# Patient Record
Sex: MALE | Race: WHITE | NOT HISPANIC OR LATINO | URBAN - METROPOLITAN AREA
[De-identification: names, ages, dates, MRNs, and addresses within clinical notes are randomized per-mention and may not be internally consistent; named-entity substitution may affect disease eponyms.]

---

## 2019-06-25 ENCOUNTER — EMERGENCY (EMERGENCY)
Facility: HOSPITAL | Age: 79
LOS: 0 days | Discharge: HOME | End: 2019-06-25
Admitting: EMERGENCY MEDICINE
Payer: COMMERCIAL

## 2019-06-25 VITALS
DIASTOLIC BLOOD PRESSURE: 98 MMHG | TEMPERATURE: 98 F | RESPIRATION RATE: 20 BRPM | OXYGEN SATURATION: 99 % | SYSTOLIC BLOOD PRESSURE: 164 MMHG | HEART RATE: 58 BPM
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
## 2019-06-25 DIAGNOSIS — S40.812A ABRASION OF LEFT UPPER ARM, INITIAL ENCOUNTER: ICD-10-CM

## 2019-06-25 DIAGNOSIS — M54.2 CERVICALGIA: ICD-10-CM

## 2019-06-25 PROCEDURE — 99283 EMERGENCY DEPT VISIT LOW MDM: CPT

## 2019-06-25 NOTE — ED PROVIDER NOTE - NS ED ROS FT
Constitutional: (-) fever  Eyes/ENT: (-) blurry vision, (-) epistaxis  Cardiovascular: (-) chest pain, (-) syncope  Respiratory: (-) cough, (-) shortness of breath  Gastrointestinal: (-) vomiting, (-) diarrhea  : (-) dysuria, (-) hematuria  Musculoskeletal: (+) neck pain, (-) back pain, (-) joint pain  Integumentary: (-) rash, (-) edema  Neurological: (-) headache, (-) altered mental status  Allergic/Immunologic: (-) pruritus
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
## 2019-06-25 NOTE — ED PROVIDER NOTE - PROGRESS NOTE DETAILS
Upon initial eval, patient and friend (who is also a pt) standing at doorway, requesting to leave, stating "I feel fine." Encouraged patient to re-enter room for a complete exam. I was directly involved in the management of this patient. Case was discussed with PA Fellow Karen
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
## 2019-06-25 NOTE — ED PROVIDER NOTE - PHYSICAL EXAMINATION
CONST: Well appearing in NAD  EYES: PERRL, EOMI, Sclera and conjunctiva clear.   ENT: No nasal discharge. Oropharynx normal appearing, no erythema or exudates. No abscess or swelling. Uvula midline.   NECK: Non-tender, no meningeal signs. normal ROM. supple   CARD: S1 S2; No jvd  RESP: Equal BS B/L, No wheezes, rhonchi or rales. No distress  GI: Soft, non-tender, non-distended. no cva tenderness. normal BS  MS: Normal ROM in all extremities. pulses 2 +. no calf tenderness or swelling  SKIN: 1cm Abrasion to L lateral arm  NEURO: A&Ox3, No focal deficits. Strength 5/5 with no sensory deficits. Steady gait. Finger to nose intact. Negative pronator drift

## 2021-01-19 ENCOUNTER — TRANSCRIPTION ENCOUNTER (OUTPATIENT)
Age: 81
End: 2021-01-19

## 2021-02-12 ENCOUNTER — TRANSCRIPTION ENCOUNTER (OUTPATIENT)
Age: 81
End: 2021-02-12

## 2022-03-09 ENCOUNTER — TRANSCRIPTION ENCOUNTER (OUTPATIENT)
Age: 82
End: 2022-03-09

## 2022-05-21 ENCOUNTER — NON-APPOINTMENT (OUTPATIENT)
Age: 82
End: 2022-05-21

## 2022-05-22 ENCOUNTER — INPATIENT (INPATIENT)
Facility: HOSPITAL | Age: 82
LOS: 1 days | Discharge: HOME | End: 2022-05-24
Attending: INTERNAL MEDICINE | Admitting: INTERNAL MEDICINE
Payer: MEDICARE

## 2022-05-22 VITALS
TEMPERATURE: 99 F | OXYGEN SATURATION: 93 % | HEIGHT: 69 IN | DIASTOLIC BLOOD PRESSURE: 63 MMHG | RESPIRATION RATE: 20 BRPM | HEART RATE: 70 BPM | WEIGHT: 199.96 LBS | SYSTOLIC BLOOD PRESSURE: 132 MMHG

## 2022-05-22 LAB
ALBUMIN SERPL ELPH-MCNC: 4.3 G/DL — SIGNIFICANT CHANGE UP (ref 3.5–5.2)
ALP SERPL-CCNC: 76 U/L — SIGNIFICANT CHANGE UP (ref 30–115)
ALT FLD-CCNC: 21 U/L — SIGNIFICANT CHANGE UP (ref 0–41)
ANION GAP SERPL CALC-SCNC: 13 MMOL/L — SIGNIFICANT CHANGE UP (ref 7–14)
AST SERPL-CCNC: 23 U/L — SIGNIFICANT CHANGE UP (ref 0–41)
BASOPHILS # BLD AUTO: 0.01 K/UL — SIGNIFICANT CHANGE UP (ref 0–0.2)
BASOPHILS NFR BLD AUTO: 0.2 % — SIGNIFICANT CHANGE UP (ref 0–1)
BILIRUB SERPL-MCNC: 0.4 MG/DL — SIGNIFICANT CHANGE UP (ref 0.2–1.2)
BUN SERPL-MCNC: 25 MG/DL — HIGH (ref 10–20)
CALCIUM SERPL-MCNC: 9.3 MG/DL — SIGNIFICANT CHANGE UP (ref 8.5–10.1)
CHLORIDE SERPL-SCNC: 101 MMOL/L — SIGNIFICANT CHANGE UP (ref 98–110)
CO2 SERPL-SCNC: 23 MMOL/L — SIGNIFICANT CHANGE UP (ref 17–32)
CREAT SERPL-MCNC: 1.3 MG/DL — SIGNIFICANT CHANGE UP (ref 0.7–1.5)
EGFR: 56 ML/MIN/1.73M2 — LOW
EOSINOPHIL # BLD AUTO: 0.02 K/UL — SIGNIFICANT CHANGE UP (ref 0–0.7)
EOSINOPHIL NFR BLD AUTO: 0.4 % — SIGNIFICANT CHANGE UP (ref 0–8)
GLUCOSE SERPL-MCNC: 144 MG/DL — HIGH (ref 70–99)
HCT VFR BLD CALC: 36.4 % — LOW (ref 42–52)
HGB BLD-MCNC: 12.2 G/DL — LOW (ref 14–18)
HMPV RNA SPEC QL NAA+PROBE: DETECTED
IMM GRANULOCYTES NFR BLD AUTO: 0.2 % — SIGNIFICANT CHANGE UP (ref 0.1–0.3)
LYMPHOCYTES # BLD AUTO: 0.81 K/UL — LOW (ref 1.2–3.4)
LYMPHOCYTES # BLD AUTO: 16.1 % — LOW (ref 20.5–51.1)
MCHC RBC-ENTMCNC: 31.5 PG — HIGH (ref 27–31)
MCHC RBC-ENTMCNC: 33.5 G/DL — SIGNIFICANT CHANGE UP (ref 32–37)
MCV RBC AUTO: 94.1 FL — HIGH (ref 80–94)
MONOCYTES # BLD AUTO: 0.54 K/UL — SIGNIFICANT CHANGE UP (ref 0.1–0.6)
MONOCYTES NFR BLD AUTO: 10.8 % — HIGH (ref 1.7–9.3)
NEUTROPHILS # BLD AUTO: 3.63 K/UL — SIGNIFICANT CHANGE UP (ref 1.4–6.5)
NEUTROPHILS NFR BLD AUTO: 72.3 % — SIGNIFICANT CHANGE UP (ref 42.2–75.2)
NRBC # BLD: 0 /100 WBCS — SIGNIFICANT CHANGE UP (ref 0–0)
NT-PROBNP SERPL-SCNC: 215 PG/ML — SIGNIFICANT CHANGE UP (ref 0–300)
PLATELET # BLD AUTO: 182 K/UL — SIGNIFICANT CHANGE UP (ref 130–400)
POTASSIUM SERPL-MCNC: 4.7 MMOL/L — SIGNIFICANT CHANGE UP (ref 3.5–5)
POTASSIUM SERPL-SCNC: 4.7 MMOL/L — SIGNIFICANT CHANGE UP (ref 3.5–5)
PROT SERPL-MCNC: 6.7 G/DL — SIGNIFICANT CHANGE UP (ref 6–8)
RAPID RVP RESULT: DETECTED
RBC # BLD: 3.87 M/UL — LOW (ref 4.7–6.1)
RBC # FLD: 12.2 % — SIGNIFICANT CHANGE UP (ref 11.5–14.5)
SARS-COV-2 RNA SPEC QL NAA+PROBE: SIGNIFICANT CHANGE UP
SODIUM SERPL-SCNC: 137 MMOL/L — SIGNIFICANT CHANGE UP (ref 135–146)
TROPONIN T SERPL-MCNC: 0.01 NG/ML — SIGNIFICANT CHANGE UP
WBC # BLD: 5.02 K/UL — SIGNIFICANT CHANGE UP (ref 4.8–10.8)
WBC # FLD AUTO: 5.02 K/UL — SIGNIFICANT CHANGE UP (ref 4.8–10.8)

## 2022-05-22 PROCEDURE — 71045 X-RAY EXAM CHEST 1 VIEW: CPT | Mod: 26

## 2022-05-22 PROCEDURE — 71275 CT ANGIOGRAPHY CHEST: CPT | Mod: 26,MA

## 2022-05-22 PROCEDURE — 76604 US EXAM CHEST: CPT | Mod: 26

## 2022-05-22 PROCEDURE — 99222 1ST HOSP IP/OBS MODERATE 55: CPT

## 2022-05-22 PROCEDURE — 93010 ELECTROCARDIOGRAM REPORT: CPT

## 2022-05-22 PROCEDURE — 99285 EMERGENCY DEPT VISIT HI MDM: CPT | Mod: FS,25

## 2022-05-22 RX ORDER — DEXTROSE 50 % IN WATER 50 %
25 SYRINGE (ML) INTRAVENOUS ONCE
Refills: 0 | Status: DISCONTINUED | OUTPATIENT
Start: 2022-05-22 | End: 2022-05-24

## 2022-05-22 RX ORDER — GUAIFENESIN/DEXTROMETHORPHAN 600MG-30MG
10 TABLET, EXTENDED RELEASE 12 HR ORAL EVERY 6 HOURS
Refills: 0 | Status: DISCONTINUED | OUTPATIENT
Start: 2022-05-22 | End: 2022-05-24

## 2022-05-22 RX ORDER — IPRATROPIUM/ALBUTEROL SULFATE 18-103MCG
3 AEROSOL WITH ADAPTER (GRAM) INHALATION EVERY 6 HOURS
Refills: 0 | Status: DISCONTINUED | OUTPATIENT
Start: 2022-05-22 | End: 2022-05-24

## 2022-05-22 RX ORDER — HEPARIN SODIUM 5000 [USP'U]/ML
5000 INJECTION INTRAVENOUS; SUBCUTANEOUS EVERY 8 HOURS
Refills: 0 | Status: DISCONTINUED | OUTPATIENT
Start: 2022-05-22 | End: 2022-05-24

## 2022-05-22 RX ORDER — IPRATROPIUM/ALBUTEROL SULFATE 18-103MCG
3 AEROSOL WITH ADAPTER (GRAM) INHALATION
Refills: 0 | Status: COMPLETED | OUTPATIENT
Start: 2022-05-22 | End: 2022-05-22

## 2022-05-22 RX ORDER — SODIUM CHLORIDE 9 MG/ML
1000 INJECTION, SOLUTION INTRAVENOUS
Refills: 0 | Status: DISCONTINUED | OUTPATIENT
Start: 2022-05-22 | End: 2022-05-24

## 2022-05-22 RX ORDER — ATORVASTATIN CALCIUM 80 MG/1
40 TABLET, FILM COATED ORAL AT BEDTIME
Refills: 0 | Status: DISCONTINUED | OUTPATIENT
Start: 2022-05-22 | End: 2022-05-24

## 2022-05-22 RX ORDER — ALLOPURINOL 300 MG
100 TABLET ORAL DAILY
Refills: 0 | Status: DISCONTINUED | OUTPATIENT
Start: 2022-05-22 | End: 2022-05-24

## 2022-05-22 RX ORDER — GLUCAGON INJECTION, SOLUTION 0.5 MG/.1ML
1 INJECTION, SOLUTION SUBCUTANEOUS ONCE
Refills: 0 | Status: DISCONTINUED | OUTPATIENT
Start: 2022-05-22 | End: 2022-05-24

## 2022-05-22 RX ORDER — CEFTRIAXONE 500 MG/1
1000 INJECTION, POWDER, FOR SOLUTION INTRAMUSCULAR; INTRAVENOUS ONCE
Refills: 0 | Status: COMPLETED | OUTPATIENT
Start: 2022-05-22 | End: 2022-05-22

## 2022-05-22 RX ORDER — DEXTROSE 50 % IN WATER 50 %
15 SYRINGE (ML) INTRAVENOUS ONCE
Refills: 0 | Status: DISCONTINUED | OUTPATIENT
Start: 2022-05-22 | End: 2022-05-24

## 2022-05-22 RX ORDER — ALLOPURINOL 300 MG
0 TABLET ORAL
Qty: 0 | Refills: 0 | DISCHARGE

## 2022-05-22 RX ORDER — AZITHROMYCIN 500 MG/1
500 TABLET, FILM COATED ORAL ONCE
Refills: 0 | Status: COMPLETED | OUTPATIENT
Start: 2022-05-22 | End: 2022-05-22

## 2022-05-22 RX ORDER — ATORVASTATIN CALCIUM 80 MG/1
0 TABLET, FILM COATED ORAL
Qty: 0 | Refills: 0 | DISCHARGE

## 2022-05-22 RX ORDER — DEXTROSE 50 % IN WATER 50 %
12.5 SYRINGE (ML) INTRAVENOUS ONCE
Refills: 0 | Status: DISCONTINUED | OUTPATIENT
Start: 2022-05-22 | End: 2022-05-24

## 2022-05-22 RX ORDER — LISINOPRIL 2.5 MG/1
40 TABLET ORAL DAILY
Refills: 0 | Status: DISCONTINUED | OUTPATIENT
Start: 2022-05-22 | End: 2022-05-22

## 2022-05-22 RX ORDER — INSULIN LISPRO 100/ML
VIAL (ML) SUBCUTANEOUS
Refills: 0 | Status: DISCONTINUED | OUTPATIENT
Start: 2022-05-22 | End: 2022-05-24

## 2022-05-22 RX ADMIN — Medication 3 MILLILITER(S): at 11:30

## 2022-05-22 RX ADMIN — CEFTRIAXONE 100 MILLIGRAM(S): 500 INJECTION, POWDER, FOR SOLUTION INTRAMUSCULAR; INTRAVENOUS at 20:16

## 2022-05-22 RX ADMIN — Medication 3 MILLILITER(S): at 12:30

## 2022-05-22 RX ADMIN — AZITHROMYCIN 255 MILLIGRAM(S): 500 TABLET, FILM COATED ORAL at 20:16

## 2022-05-22 RX ADMIN — Medication 125 MILLIGRAM(S): at 13:05

## 2022-05-22 RX ADMIN — Medication 3 MILLILITER(S): at 13:06

## 2022-05-22 NOTE — ED PROVIDER NOTE - CARE PLAN
1 Principal Discharge DX:	Hypoxia  Secondary Diagnosis:	Elevated troponin  Secondary Diagnosis:	Pneumonia due to human metapneumovirus

## 2022-05-22 NOTE — H&P ADULT - ATTENDING COMMENTS
GENERAL: NAD  HEAD:  Atraumatic, Normocephalic  EYES: EOMI, Sclera White   NECK: Supple, No JVD  CHEST/LUNG: clear b/l breath sounds; No wheezing, rhonchi, or crackles  HEART: Regular rate and rhythm; s1, s2, No murmurs, rubs, or gallops  ABDOMEN: Soft, Nontender, Nondistended; Bowel sounds present, No rebound or guarding noted   EXTREMITIES:  No lower extremity edema or calf tenderness to palpation.  No clubbing or cyanosis  PSYCH: AAOx3, pleasant, cooperative, not anxious  NEUROLOGY: CN intact, 5/5 strength in all extremities, Sensation grossly intact.   SKIN: No rashes or lesions    81 yr old male with hx of DM, HTN, HLD, and Gout admitted for cough and chest congestion for 3-4 days.     # Viral URTI  - RVP (+): human metapneumovirus   - sat 97% on RA at encounter   - pt states his cough getting better  - CTA chest negative for PE. Shows Tree-in-bud nodularity and ground-glass opacities most prominent at right lung base  - hold abx  - symptomatic management    # ARIELLE   - Cr 1.3   - likely prerenal   - give IVF @ 75cc/hr and encourage PO intake   - hold ACE-inhbitor   - monitor bmp    # Anticipated for discharge    **Pt seen on 05/22/22

## 2022-05-22 NOTE — ED PROVIDER NOTE - ATTENDING APP SHARED VISIT CONTRIBUTION OF CARE
81 -year-old male with history of hypertension, diabetes, hyperlipidemia, former smoker presenting with a cough x4 days.  Patient denies any fevers.  Denies any chest pain.  States that he thinks it is related to a pesticide that was [place] on [place] however he has never had an issue like this in the past.  Denies any lesions to the skin.  Denies any lower extremity edema.  Went to urgent care center because of the cough and was told he had abnormal breath sounds were referred to the ED for evaluation.    CONSTITUTIONAL: Well-developed; well-nourished; in no acute distress.   SKIN: warm, dry  HEAD: Normocephalic; atraumatic.  EYES: PERRL, EOMI, no conjunctival erythema  ENT: No nasal discharge; airway clear.  NECK: Supple; non tender.  CARD: S1, S2 normal; no murmurs, gallops, or rubs. Regular rate and rhythm.   RESP: normal work of breathing; crackles bibasilarly- mild exp wheeze bilaterally  ABD: soft ntnd.  EXT: Normal ROM.  No clubbing, cyanosis or edema.   NEURO: Alert, oriented, grossly unremarkable.  PSYCH: Cooperative, appropriate.    copd vs chf vs pneumonia  plan for labs, imaging, will trial duonebs and reassess.

## 2022-05-22 NOTE — H&P ADULT - NSHPLABSRESULTS_GEN_ALL_CORE
12.2   5.02  )-----------( 182      ( 22 May 2022 12:37 )             36.4     05-22    137  |  101  |  25<H>  ----------------------------<  144<H>  4.7   |  23  |  1.3    Ca    9.3      22 May 2022 12:37    TPro  6.7  /  Alb  4.3  /  TBili  0.4  /  DBili  x   /  AST  23  /  ALT  21  /  AlkPhos  76  05-22        CARDIAC MARKERS ( 22 May 2022 12:37 )  x     / 0.01 ng/mL / x     / x     / x            LIVER FUNCTIONS - ( 22 May 2022 12:37 )  Alb: 4.3 g/dL / Pro: 6.7 g/dL / ALK PHOS: 76 U/L / ALT: 21 U/L / AST: 23 U/L / GGT: x                     RADIOLOGY & ADDITIONAL STUDIES:      ACC: [id number] [facility]  [facility]                          PROCEDURE DATE:  05/22/2022          INTERPRETATION:  CLINICAL STATEMENT: Cough, clinical concern for   pulmonary embolus.    TECHNIQUE: Multislice helical sections were obtained from the thoracic   inlet to the lung bases during rapid administration of 75 cc of Omnipaque   350 intravenous contrast using a CTA protocol. Thin sections were   reconstructed through the pulmonary vasculature. Sagittal and coronal   reformatted images were acquired, as well as MIP reconstructed images.    COMPARISON CT: No direct comparison is available. Correlation is made   with CT abdomen and pelvis dated 1/31/2011.    FINDINGS:    PULMONARY EMBOLUS: No pulmonary embolus.    LUNGS: Central airways are patent. Peribronchial wall thickening.   Tree-in-bud nodularity and groundglass opacities most prominent at the   right lung base. Underlying emphysematous changes.    HEART/GREAT VESSELS: The heart is not visually enlarged. Trace   pericardial effusion. The great vessels are of normal caliber.    MEDIASTINUM/THORACIC NODES: No lymphadenopathy. Visualized portions of   the thyroid gland are symmetric.    VISUALIZED UPPER ABDOMEN: Status post cholecystectomy.    BONES/SOFT TISSUES: No acute osseous abnormality or destructive soft   tissue process.    IMPRESSION:    No pulmonary embolus.    Tree-in-bud nodularity and groundglass opacities most prominent at the   right lung base. Differential includes infectious, inflammatory, and   neoplastic etiologies. Consider short-term 3 month follow-up CT chest for   further evaluation.
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
## 2022-05-22 NOTE — H&P ADULT - NSHPPHYSICALEXAM_GEN_ALL_CORE
LOS:     VITALS:   T(C): 36.8 (05-22-22 @ 19:38), Max: 37.1 (05-22-22 @ 10:06)  HR: 58 (05-22-22 @ 19:38) (58 - 77)  BP: 153/74 (05-22-22 @ 19:38) (132/63 - 158/81)  RR: 18 (05-22-22 @ 19:38) (18 - 20)  SpO2: 96% (05-22-22 @ 19:38) (93% - 96%)    GENERAL: NAD, lying in bed comfortably  HEAD:  Atraumatic, Normocephalic  EYES: EOMI, PERRLA, conjunctiva and sclera clear  ENT: Moist mucous membranes  NECK: Supple, No JVD  CHEST/LUNG: Clear to auscultation bilaterally; No rales, rhonchi, wheezing, or rubs. Unlabored respirations  HEART: Regular rate and rhythm; No murmurs, rubs, or gallops  ABDOMEN: BSx4; Soft, nontender, nondistended  EXTREMITIES:  2+ Peripheral Pulses, brisk capillary refill. No clubbing, cyanosis, or edema  NERVOUS SYSTEM:  A&Ox3, no focal deficits   SKIN: No rashes or lesions VITALS:   T(C): 36.8 (05-22-22 @ 19:38), Max: 37.1 (05-22-22 @ 10:06)  HR: 58 (05-22-22 @ 19:38) (58 - 77)  BP: 153/74 (05-22-22 @ 19:38) (132/63 - 158/81)  RR: 18 (05-22-22 @ 19:38) (18 - 20)  SpO2: 96% (05-22-22 @ 19:38) (93% - 96%)    GENERAL: NAD, lying in bed comfortably  HEAD:  Atraumatic, Normocephalic  EYES: EOMI, PERRLA, conjunctiva and sclera clear  CHEST/LUNG: Clear to auscultation bilaterally;   HEART: Regular rate and rhythm; No murmurs, rubs, or gallops  ABDOMEN: BSx4; Soft, nontender, nondistended  EXTREMITIES:  2+ Peripheral Pulses, brisk capillary refill. No clubbing, cyanosis, or edema  NERVOUS SYSTEM:  A&Ox3, no focal deficits

## 2022-05-22 NOTE — ED PROVIDER NOTE - PHYSICAL EXAMINATION
GENERAL: Well-nourished, Well-developed. NAD.  HEAD: No visible or palpable bumps or hematomas. No ecchymosis behind ears B/L.  Eyes: PERRLA, EOMI. No asymmetry. No nystagmus. No conjunctival injection. Non-icteric sclera.  ENMT: MMM.   Neck: Supple. FROM  CVS: Normal S1,S2. No murmurs appreciated on auscultation   RESP: No use of accessory muscles. Chest rise symmetrical with good expansion. (+)b/l mild basilar crackles/wheezing  GI: Normal auscultation of bowel sounds in all 4 quadrants. Soft, Nontender, Nondistended. No guarding or rebound tenderness. No CVAT B/L.  Skin: Warm, Dry. No rashes or lesions. Good cap refill < 2 sec B/L.  EXT: Radial and pedal pulses present B/L. No calf tenderness or swelling B/L. No palpable cords. No pedal edema B/L.

## 2022-05-22 NOTE — ED PROVIDER NOTE - CLINICAL SUMMARY MEDICAL DECISION MAKING FREE TEXT BOX
82 yo M w/ hx as stated p/w cough.   Patient hypoxic to 88% on RA with ambulation.  Labs and imaging reviewed- tree in bud changes to the RLL.  First dose of abx given in ED.  +hpmv   admitted for further management.

## 2022-05-22 NOTE — H&P ADULT - ASSESSMENT
80 y/o male with PMHx of DM, HTN, HLD, and Gout presented to the ED for cough and chest congestion for 3-4 days.     #Productive Cough secondary to hMPV  - pt reports 3-4 days of productive cough and chest congestion  - denies any fevers, chills, SOB, chest pain  - admitted for pulse ox 93% on RA, currently saturating 95-96% on RA, looks comfortable   - afebrile, no leukocytosis   - CTA chest negative for PE. Shows Tree-in-bud nodularity and groundglass opacities most prominent at right lung base  - s/p Azithromycin and Rocephin in ED, monitor off Abx   - s/p Solumedrol 125mcg in ED  - RVP positive for human metapneumovirus   - supportive care with cough suppressants and mucinex     #ARIELLE   - Cr 1.3 on admission, unknown baseline   - pt denies any kidney disease, was told last week his blood work showed dehydration  - pt reports poor oral intake   - give IVF @ 75cc/hr and encourage PO intake   - hold ACE-inhbitor     #Tree-in-bud nodularity and ground glass opacities  #former smoker, quit >50 years ago   - noted on CT scan, differential infectious vs inflammatory vs neoplastic   - follow up CT in 3 months     #DM   - hold janumet  - monitor FS, start basal/bolus if FS >180  - check A1c     #HTN  - holding Lisinopril (on Rampiril at home) for ARIELLE    #HLD  - continue with atorvastatin    #DVT PPx: Heparin subq  #GI PPx: not indicated  #DASH  82 y/o male with PMHx of DM, HTN, HLD, and Gout presented to the ED for cough and chest congestion for 3-4 days.     #Productive Cough secondary to hMPV  - pt reports 3-4 days of productive cough and chest congestion  - denies any fevers, chills, SOB, chest pain  - admitted for pulse ox 93% on RA, currently saturating 95-96% on RA, looks comfortable   - afebrile, no leukocytosis   - CTA chest negative for PE. Shows Tree-in-bud nodularity and groundglass opacities most prominent at right lung base  - s/p Azithromycin and Rocephin in ED, monitor off Abx   - s/p Solumedrol 125mcg in ED  - RVP positive for human metapneumovirus   - supportive care with cough suppressants and mucinex     #ARIELLE   - Cr 1.3 on admission, unknown baseline   - pt denies any kidney disease, was told last week his blood work showed dehydration  - pt reports poor oral intake   - give IVF @ 75cc/hr and encourage PO intake   - hold ACE-inhbitor     #Tree-in-bud nodularity and ground glass opacities  #former smoker, quit >50 years ago   - noted on CT scan, differential infectious vs inflammatory vs neoplastic   - follow up CT in 3 months     #DM   - hold janumet  - monitor FS, start basal/bolus if FS >180  - check A1c     #HTN  - holding Lisinopril (on Rampiril at home) for ARIELLE    #HLD  - continue with atorvastatin    #DVT PPx: Heparin subq  #GI PPx: not indicated  #DASH   MED REC completed with pt at bedside, confirm with [facility]  80 y/o male with PMHx of DM, HTN, HLD, and Gout presented to the ED for cough and chest congestion for 3-4 days.     #Productive Cough secondary to hMPV  - pt reports 3-4 days of productive cough and chest congestion  - denies any fevers, chills, SOB, chest pain  - admitted for pulse ox 93% on RA, currently saturating 95-96% on RA, looks comfortable   - afebrile, no leukocytosis   - CTA chest negative for PE. Shows Tree-in-bud nodularity and groundglass opacities most prominent at right lung base  - s/p Azithromycin and Rocephin in ED, monitor off Abx   - s/p Solumedrol 125mcg in ED  - RVP positive for human metapneumovirus   - supportive care with cough suppressants and mucinex     #ARIELLE   - Cr 1.3 on admission, unknown baseline   - pt denies any kidney disease, was told last week his blood work showed dehydration  - pt reports poor oral intake   - give IVF @ 75cc/hr and encourage PO intake   - hold ACE-inhbitor     #Tree-in-bud nodularity and ground glass opacities  #former smoker, quit >50 years ago   - noted on CT scan, differential infectious vs inflammatory vs neoplastic   - follow up CT in 3 months     #DM   - hold janumet  - monitor FS, start basal/bolus if FS >180  - check A1c     #HTN  - holding Lisinopril (on Rampiril at home) for ARIELLE    #HLD  - continue with atorvastatin    #Gout  - continue with allopurinol     #DVT PPx: Heparin subq  #GI PPx: not indicated  #DASH   MED REC completed with pt at bedside, confirm with [facility]
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
## 2022-05-22 NOTE — H&P ADULT - NSHPSOCIALHISTORY_GEN_ALL_CORE
Former smoker quit >50years ago   Social drinker Former smoker quit >50years ago   Social drinker  Denies illicit drug use

## 2022-05-22 NOTE — ED PROCEDURE NOTE - NS ED ATTENDING STATEMENT MOD
This was a shared visit with the JOHN. I reviewed and verified the documentation and independently performed the documented:
This was a shared visit with the JOHN. I reviewed and verified the documentation and independently performed the documented:

## 2022-05-22 NOTE — ED PROVIDER NOTE - NS ED ROS FT
Constitutional: (-) fever (-) malaise (-) diaphoresis (-) chills (-) wt. loss (-) bodyaches (-) night sweats  Eyes: (-) visual changes (-) eye pain (-) eye discharge (-) photophobia (-) FB sensation  ENMT: (+) chest congestion (-) runny nose (-) sore throat (-) hoarseness  (-) hearing changes (-) ear pain (-) ear discharge or infections (-) neck pain (-) neck stiffness  Cardiac: (-) chest pain  (-) palpitations (-) syncope (-) edema  Respiratory: (+) cough (-) SOB (-) PINEDA  GI: (-) nausea (-) vomiting (-) diarrhea (-) abdominal pain  : (-) dysuria (-) increased frequency  (-) hematuria (-) incontinence  MS: (-) back pain (-) myalgia (-) muscle weakness (-)  joint pain  Neuro: (-) headache (-) dizziness (-) numbness/tingling to extremities B/L (-) weakness  Skin: (-) rash (-) laceration    Except as documented in the HPI, all other systems are negative.

## 2022-05-22 NOTE — ED PROCEDURE NOTE - NS ED PROCEDURE ASSISTED BY
ATTENDING PROVIDER ATTESTATION:     Milad Martin presented to the emergency department for evaluation of Psychiatric Evaluation (increased depression \"I'm having mixed emotions\" reports hearing and seeing things/voices, last used cocaine earlier today, denies si/hi )   and was initially evaluated by the Medical Resident. See Original ED Note for H&P and ED course above. I have reviewed and discussed the case, including pertinent history (medical, surgical, family and social) and exam findings with the Medical Resident assigned to Milad Martin .  I have personally performed and/or participated in the history, exam, medical decision making, and procedures and agree with all pertinent clinical information and any additional changes or corrections are noted below in my assessment and plan. I have discussed this patient in detail with the resident, and provided the instruction and education,       I have reviewed my findings and recommendations with the assigned Medical Resident, Milad Martin and members of family present at the time of disposition. I have performed a history and physical examination of this patient and directly supervised the resident caring for this patient      History of Present Illness:    Presents to the ED for depression, beginning quite some time ago. The complaint has been persistent, moderate in severity, and worsened by nothing. Increased depression today. Says he is having mixed emotions. Having hallucinations/hearing voices after using cocaine. Denies SI or HI. Denies other complaints.  No chest pain or shortness of breath        Review of Systems:   A complete review of systems was performed and pertinent positives and negatives are stated within HPI, all other systems reviewed and are negative.    --------------------------------------------- PAST HISTORY ---------------------------------------------  Past Medical History:  has a past medical history of
Depression and Schizoaffective disorder ([facility] [address] Past Surgical History:  has a past surgical history that includes eye surgery (19 years ago). Social History:  reports that he has been smoking cigars and cigarettes. He has a 6.00 pack-year smoking history. He has never used smokeless tobacco. He reports current drug use. Frequency: 7.00 times per week. Drugs: Cocaine and Marijuana. He reports that he does not drink alcohol. Family History: family history includes Mental Illness in his mother; No Known Problems in his father; Substance Abuse in his mother. Unless otherwise noted, family history is non contributory    The patients home medications have been reviewed. Allergies: Chlorpheniramine-phenylephrine, Penicillins, and Rondec-d [chlophedianol-pseudoephedrine]      Physical Exam:  Constitutional/General: Alert and oriented x3  Head: Normocephalic and atraumatic  Eyes: PERRL, EOMI, sclera non icteric  ENT: Oropharynx clear, handling secretions  Neck: Supple, full ROM, no stridor, no meningeal signs  Respiratory: Lungs clear to auscultation bilaterally, no wheezes, rales, or rhonchi. Not in respiratory distress  Cardiovascular:  Regular rate. Regular rhythm. No murmurs, no gallops, no rubs. 2+ distal pulses. Equal extremity pulses. Musculoskeletal: Moves all extremities x 4. Warm and well perfused,  no clubbing, no cyanosis, no edema. Palpable peripheral pulses  Integument: skin warm and dry. No rashes. Neurologic: GCS 15, no focal deficits  Psychiatric: Normal Affect      I directly supervised any procedures performed by the resident and was present for the procedure including all critical portions of the procedure           I, Dr. Marcy Hernandez, am the primary provider of record    My Medical Decision Making:         Drug abuse  Also homeless  Also with mood disorder  No SI or HI  No indication for admission  Evaluated by  as well        1. Cocaine abuse ([facility] [address]    2. Homeless    3.  Mood
disorder [facility])           Biju Martínez MD  06/25/21 [id number]
Supervision was available
Supervision was available

## 2022-05-23 ENCOUNTER — TRANSCRIPTION ENCOUNTER (OUTPATIENT)
Age: 82
End: 2022-05-23

## 2022-05-23 VITALS
SYSTOLIC BLOOD PRESSURE: 136 MMHG | OXYGEN SATURATION: 97 % | DIASTOLIC BLOOD PRESSURE: 83 MMHG | RESPIRATION RATE: 18 BRPM | HEART RATE: 64 BPM

## 2022-05-23 DIAGNOSIS — Z98.890 OTHER SPECIFIED POSTPROCEDURAL STATES: Chronic | ICD-10-CM

## 2022-05-23 LAB
A1C WITH ESTIMATED AVERAGE GLUCOSE RESULT: 7.3 % — HIGH (ref 4–5.6)
ALBUMIN SERPL ELPH-MCNC: 4 G/DL — SIGNIFICANT CHANGE UP (ref 3.5–5.2)
ALP SERPL-CCNC: 72 U/L — SIGNIFICANT CHANGE UP (ref 30–115)
ALT FLD-CCNC: 21 U/L — SIGNIFICANT CHANGE UP (ref 0–41)
ANION GAP SERPL CALC-SCNC: 14 MMOL/L — SIGNIFICANT CHANGE UP (ref 7–14)
AST SERPL-CCNC: 24 U/L — SIGNIFICANT CHANGE UP (ref 0–41)
BASOPHILS # BLD AUTO: 0.01 K/UL — SIGNIFICANT CHANGE UP (ref 0–0.2)
BASOPHILS NFR BLD AUTO: 0.2 % — SIGNIFICANT CHANGE UP (ref 0–1)
BILIRUB SERPL-MCNC: 0.3 MG/DL — SIGNIFICANT CHANGE UP (ref 0.2–1.2)
BUN SERPL-MCNC: 31 MG/DL — HIGH (ref 10–20)
CALCIUM SERPL-MCNC: 8.9 MG/DL — SIGNIFICANT CHANGE UP (ref 8.5–10.1)
CHLORIDE SERPL-SCNC: 100 MMOL/L — SIGNIFICANT CHANGE UP (ref 98–110)
CHOLEST SERPL-MCNC: 137 MG/DL — SIGNIFICANT CHANGE UP
CO2 SERPL-SCNC: 22 MMOL/L — SIGNIFICANT CHANGE UP (ref 17–32)
CREAT SERPL-MCNC: 1.2 MG/DL — SIGNIFICANT CHANGE UP (ref 0.7–1.5)
EGFR: 61 ML/MIN/1.73M2 — SIGNIFICANT CHANGE UP
EOSINOPHIL # BLD AUTO: 0 K/UL — SIGNIFICANT CHANGE UP (ref 0–0.7)
EOSINOPHIL NFR BLD AUTO: 0 % — SIGNIFICANT CHANGE UP (ref 0–8)
ESTIMATED AVERAGE GLUCOSE: 163 MG/DL — HIGH (ref 68–114)
GLUCOSE BLDC GLUCOMTR-MCNC: 236 MG/DL — HIGH (ref 70–99)
GLUCOSE SERPL-MCNC: 239 MG/DL — HIGH (ref 70–99)
HCT VFR BLD CALC: 36.6 % — LOW (ref 42–52)
HDLC SERPL-MCNC: 42 MG/DL — SIGNIFICANT CHANGE UP
HGB BLD-MCNC: 12.1 G/DL — LOW (ref 14–18)
IMM GRANULOCYTES NFR BLD AUTO: 0.2 % — SIGNIFICANT CHANGE UP (ref 0.1–0.3)
LIPID PNL WITH DIRECT LDL SERPL: 76 MG/DL — SIGNIFICANT CHANGE UP
LYMPHOCYTES # BLD AUTO: 0.71 K/UL — LOW (ref 1.2–3.4)
LYMPHOCYTES # BLD AUTO: 16.2 % — LOW (ref 20.5–51.1)
MCHC RBC-ENTMCNC: 31.1 PG — HIGH (ref 27–31)
MCHC RBC-ENTMCNC: 33.1 G/DL — SIGNIFICANT CHANGE UP (ref 32–37)
MCV RBC AUTO: 94.1 FL — HIGH (ref 80–94)
MONOCYTES # BLD AUTO: 0.47 K/UL — SIGNIFICANT CHANGE UP (ref 0.1–0.6)
MONOCYTES NFR BLD AUTO: 10.7 % — HIGH (ref 1.7–9.3)
NEUTROPHILS # BLD AUTO: 3.18 K/UL — SIGNIFICANT CHANGE UP (ref 1.4–6.5)
NEUTROPHILS NFR BLD AUTO: 72.7 % — SIGNIFICANT CHANGE UP (ref 42.2–75.2)
NON HDL CHOLESTEROL: 95 MG/DL — SIGNIFICANT CHANGE UP
NRBC # BLD: 0 /100 WBCS — SIGNIFICANT CHANGE UP (ref 0–0)
PLATELET # BLD AUTO: 185 K/UL — SIGNIFICANT CHANGE UP (ref 130–400)
POTASSIUM SERPL-MCNC: 5 MMOL/L — SIGNIFICANT CHANGE UP (ref 3.5–5)
POTASSIUM SERPL-SCNC: 5 MMOL/L — SIGNIFICANT CHANGE UP (ref 3.5–5)
PROT SERPL-MCNC: 6.3 G/DL — SIGNIFICANT CHANGE UP (ref 6–8)
RBC # BLD: 3.89 M/UL — LOW (ref 4.7–6.1)
RBC # FLD: 11.9 % — SIGNIFICANT CHANGE UP (ref 11.5–14.5)
SODIUM SERPL-SCNC: 136 MMOL/L — SIGNIFICANT CHANGE UP (ref 135–146)
TRIGL SERPL-MCNC: 95 MG/DL — SIGNIFICANT CHANGE UP
WBC # BLD: 4.38 K/UL — LOW (ref 4.8–10.8)
WBC # FLD AUTO: 4.38 K/UL — LOW (ref 4.8–10.8)

## 2022-05-23 PROCEDURE — 99239 HOSP IP/OBS DSCHRG MGMT >30: CPT

## 2022-05-23 RX ORDER — RAMIPRIL 5 MG
0 CAPSULE ORAL
Qty: 0 | Refills: 3 | DISCHARGE

## 2022-05-23 RX ORDER — AZITHROMYCIN 500 MG/1
1 TABLET, FILM COATED ORAL
Qty: 5 | Refills: 0
Start: 2022-05-23 | End: 2022-05-27

## 2022-05-23 RX ORDER — GUAIFENESIN/DEXTROMETHORPHAN 600MG-30MG
10 TABLET, EXTENDED RELEASE 12 HR ORAL
Qty: 200 | Refills: 0
Start: 2022-05-23 | End: 2022-05-27

## 2022-05-23 RX ORDER — SITAGLIPTIN AND METFORMIN HYDROCHLORIDE 500; 50 MG/1; MG/1
0 TABLET, FILM COATED ORAL
Qty: 0 | Refills: 2 | DISCHARGE

## 2022-05-23 RX ORDER — EZETIMIBE 10 MG/1
1 TABLET ORAL
Qty: 0 | Refills: 0 | DISCHARGE

## 2022-05-23 RX ORDER — ATORVASTATIN CALCIUM 80 MG/1
1 TABLET, FILM COATED ORAL
Qty: 0 | Refills: 0 | DISCHARGE

## 2022-05-23 RX ORDER — ALLOPURINOL 300 MG
1 TABLET ORAL
Qty: 0 | Refills: 0 | DISCHARGE

## 2022-05-23 RX ADMIN — ATORVASTATIN CALCIUM 40 MILLIGRAM(S): 80 TABLET, FILM COATED ORAL at 02:41

## 2022-05-23 RX ADMIN — SODIUM CHLORIDE 75 MILLILITER(S): 9 INJECTION, SOLUTION INTRAVENOUS at 02:41

## 2022-05-23 RX ADMIN — HEPARIN SODIUM 5000 UNIT(S): 5000 INJECTION INTRAVENOUS; SUBCUTANEOUS at 02:42

## 2022-05-23 RX ADMIN — Medication 2: at 07:29

## 2022-05-23 NOTE — DISCHARGE NOTE PROVIDER - CARE PROVIDER_API CALL
[place] [address]  Phone: [phone]  Fax: [phone]  Follow Up Time: 2 weeks

## 2022-05-23 NOTE — DISCHARGE NOTE PROVIDER - HOSPITAL COURSE
80 y/o male with PMHx of DM, HTN, HLD, and Gout presented to the ED for cough and chest congestion for 3-4 days. Pt tested positive for metapneumovirus. will d/c on azithromycin  Lung nodularities, needs follow up CT in 3 months

## 2022-05-23 NOTE — DISCHARGE NOTE PROVIDER - NSDCCPCAREPLAN_GEN_ALL_CORE_FT
PRINCIPAL DISCHARGE DIAGNOSIS  Diagnosis: Acute bronchiolitis due to human metapneumovirus  Assessment and Plan of Treatment: You were diagnosed with a viral infection. Please make sure to stay hydrated, take tylenol as needed in case you have any fever.      SECONDARY DISCHARGE DIAGNOSES  Diagnosis: Pulmonary nodules  Assessment and Plan of Treatment: You had some nodularities in the lungs on the CT. You will need a follow up CT in 3 months. Please follow up with your primary care doctor.

## 2022-05-23 NOTE — DISCHARGE NOTE NURSING/CASE MANAGEMENT/SOCIAL WORK - PATIENT PORTAL LINK FT
You can access the FollowMyHealth Patient Portal offered by [facility] by registering at the following website: [email or web address]. By joining [employer]’s FollowMyHealth portal, you will also be able to view your health information using other applications (apps) compatible with our system.

## 2022-05-23 NOTE — DISCHARGE NOTE PROVIDER - NSDCMRMEDTOKEN_GEN_ALL_CORE_FT
ALLOPURINOL 100 MG TABLET: 1 tab(s) orally once a day  ATORVASTATIN 40 MG TABLET: 1 tab(s) orally once a day  Azithromycin 5 Day Dose Pack 250 mg oral tablet: 1 tab(s) orally once a day   guaifenesin-dextromethorphan 100 mg-10 mg/5 mL oral liquid: 10 milliliter(s) orally every 6 hours, As needed, Cough  JANUMET 50/500 MG TABLET MS: TAKE 1 TABLET EVERY DAY  RAMIPRIL 10 MG CAPS: TAKE 1 CAPSULE EVERY DAY   ALLOPURINOL 100 MG TABLET: 1 tab(s) orally once a day  ATORVASTATIN 40 MG TABLET: 1 tab(s) orally once a day  Azithromycin 5 Day Dose Pack 250 mg oral tablet: 1 tab(s) orally once a day   ezetimibe 10 mg oral tablet: 1 tab(s) orally once a day  guaifenesin-dextromethorphan 100 mg-10 mg/5 mL oral liquid: 10 milliliter(s) orally every 6 hours, As needed, Cough  JANUMET 50/500 MG TABLET MS: TAKE 1 TABLET EVERY DAY  RAMIPRIL 10 MG CAPS: TAKE 1 CAPSULE EVERY DAY

## 2022-05-23 NOTE — PROGRESS NOTE ADULT - SUBJECTIVE AND OBJECTIVE BOX
pt seen and examined.   no sob, no cp        ROS: no cp, no sob, no n/v, no fever    PAST MEDICAL & SURGICAL HISTORY:  DM (diabetes mellitus)  HTN (hypertension)  HLD (hyperlipidemia)  H/O knee surgery    Vital Signs Last 24 Hrs  T(C): 36.7 (22 May 2022 23:42), Max: 37 (22 May 2022 15:23)  T(F): 98 (22 May 2022 23:42), Max: 98.6 (22 May 2022 15:23)  HR: 64 (23 May 2022 11:00) (48 - 77)  BP: 136/83 (23 May 2022 11:00) (136/83 - 160/77)  BP(mean): --  RR: 18 (23 May 2022 11:00) (18 - 20)  SpO2: 97% (23 May 2022 11:00) (96% - 97%)    physical exam  constitutional NAD, AAOX3, Respiratory  lungs CTA, CVS heart RRR, GI: abdomen Soft NT, ND, BS+, skin: intact  neuro exam Motor, sensory and CN normal, no deficit     MEDICATIONS  (STANDING):  allopurinol 100 milliGRAM(s) Oral daily  atorvastatin 40 milliGRAM(s) Oral at bedtime  dextrose 5%. 1000 milliLiter(s) (50 mL/Hr) IV Continuous <Continuous>  dextrose 5%. 1000 milliLiter(s) (100 mL/Hr) IV Continuous <Continuous>  dextrose 50% Injectable 25 Gram(s) IV Push once  dextrose 50% Injectable 12.5 Gram(s) IV Push once  dextrose 50% Injectable 25 Gram(s) IV Push once  glucagon  Injectable 1 milliGRAM(s) IntraMuscular once  heparin   Injectable 5000 Unit(s) SubCutaneous every 8 hours  insulin lispro (ADMELOG) corrective regimen sliding scale   SubCutaneous three times a day before meals  lactated ringers. 1000 milliLiter(s) (75 mL/Hr) IV Continuous <Continuous>    MEDICATIONS  (PRN):  albuterol/ipratropium for Nebulization 3 milliLiter(s) Nebulizer every 6 hours PRN Shortness of Breath and/or Wheezing  dextrose Oral Gel 15 Gram(s) Oral once PRN Blood Glucose LESS THAN 70 milliGRAM(s)/deciliter  guaifenesin/dextromethorphan Oral Liquid 10 milliLiter(s) Oral every 6 hours PRN Cough      CAPILLARY BLOOD GLUCOSE    POCT Blood Glucose.: 236 mg/dL (23 May 2022 07:26)                          12.1   4.38  )-----------( 185      ( 23 May 2022 07:12 )             36.6     05-23    136  |  100  |  31<H>  ----------------------------<  239<H>  5.0   |  22  |  1.2    Ca    8.9      23 May 2022 07:12    TPro  6.3  /  Alb  4.0  /  TBili  0.3  /  DBili  x   /  AST  24  /  ALT  21  /  AlkPhos  72  05-23    < from: CT Angio Chest PE Protocol w/ IV Cont (05.22.22 @ 16:22) >  Tree-in-bud nodularity and groundglass opacities most prominent at the   right lung base. Differential includes infectious, inflammatory, and   neoplastic etiologies. Consider short-term 3 month follow-up CT chest for   further evaluation.    < end of copied text >        CARDIAC MARKERS ( 22 May 2022 12:37 )  x     / 0.01 ng/mL / x     / x     / x        a/p  # acute viral bronchitis, supprotive measures,   not hypoxic  zpack for abnl imagaing, poss pna,     # abnl ct chest , repeat ct in 3 months     # abnl cr, probably chronic, no previous labs in the system, outpt fu     # dm cont meds    # mild hyperkalmia, out pt fu     #Progress Note Handoff  Pending (specify): discharge  Family discussion: dw pt and his wife at the bedside. left msg for PMD Dr Mullins.   Disposition: Home

## 2022-05-23 NOTE — ED POST DISCHARGE NOTE - RESULT SUMMARY
discussed with patients son CTA chest results regarding ground glass opacities. recommend rpt CT chest in 3 months.

## 2022-05-23 NOTE — DISCHARGE NOTE NURSING/CASE MANAGEMENT/SOCIAL WORK - NSDCPEFALRISK_GEN_ALL_CORE
For information on Fall & Injury Prevention, visit: [email or web address] OR  [email or web address] OR  https://www.cdc.gov/steadi/patient.html

## 2022-05-27 DIAGNOSIS — E78.5 HYPERLIPIDEMIA, UNSPECIFIED: ICD-10-CM

## 2022-05-27 DIAGNOSIS — M10.9 GOUT, UNSPECIFIED: ICD-10-CM

## 2022-05-27 DIAGNOSIS — N17.9 ACUTE KIDNEY FAILURE, UNSPECIFIED: ICD-10-CM

## 2022-05-27 DIAGNOSIS — R06.02 SHORTNESS OF BREATH: ICD-10-CM

## 2022-05-27 DIAGNOSIS — R91.1 SOLITARY PULMONARY NODULE: ICD-10-CM

## 2022-05-27 DIAGNOSIS — E87.5 HYPERKALEMIA: ICD-10-CM

## 2022-05-27 DIAGNOSIS — I10 ESSENTIAL (PRIMARY) HYPERTENSION: ICD-10-CM

## 2022-05-27 DIAGNOSIS — R91.8 OTHER NONSPECIFIC ABNORMAL FINDING OF LUNG FIELD: ICD-10-CM

## 2022-05-27 DIAGNOSIS — J21.1 ACUTE BRONCHIOLITIS DUE TO HUMAN METAPNEUMOVIRUS: ICD-10-CM

## 2022-05-27 DIAGNOSIS — E11.9 TYPE 2 DIABETES MELLITUS WITHOUT COMPLICATIONS: ICD-10-CM

## 2022-05-27 DIAGNOSIS — J12.3 HUMAN METAPNEUMOVIRUS PNEUMONIA: ICD-10-CM

## 2022-05-27 DIAGNOSIS — Z87.891 PERSONAL HISTORY OF NICOTINE DEPENDENCE: ICD-10-CM

## 2024-01-04 ENCOUNTER — NON-APPOINTMENT (OUTPATIENT)
Age: 84
End: 2024-01-04

## 2024-10-04 ENCOUNTER — INPATIENT (INPATIENT)
Facility: HOSPITAL | Age: 84
LOS: 11 days | Discharge: HOME HEALTH | DRG: 233 | End: 2024-10-16
Attending: THORACIC SURGERY (CARDIOTHORACIC VASCULAR SURGERY) | Admitting: STUDENT IN AN ORGANIZED HEALTH CARE EDUCATION/TRAINING PROGRAM
Payer: MEDICARE

## 2024-10-04 VITALS
DIASTOLIC BLOOD PRESSURE: 67 MMHG | RESPIRATION RATE: 18 BRPM | OXYGEN SATURATION: 96 % | SYSTOLIC BLOOD PRESSURE: 154 MMHG | HEART RATE: 76 BPM | WEIGHT: 205.47 LBS | TEMPERATURE: 98 F

## 2024-10-04 DIAGNOSIS — R79.89 OTHER SPECIFIED ABNORMAL FINDINGS OF BLOOD CHEMISTRY: ICD-10-CM

## 2024-10-04 DIAGNOSIS — Z98.890 OTHER SPECIFIED POSTPROCEDURAL STATES: Chronic | ICD-10-CM

## 2024-10-04 PROBLEM — I10 ESSENTIAL (PRIMARY) HYPERTENSION: Chronic | Status: ACTIVE | Noted: 2022-05-22

## 2024-10-04 PROBLEM — E11.9 TYPE 2 DIABETES MELLITUS WITHOUT COMPLICATIONS: Chronic | Status: ACTIVE | Noted: 2022-05-22

## 2024-10-04 PROBLEM — Z00.00 ENCOUNTER FOR PREVENTIVE HEALTH EXAMINATION: Status: ACTIVE | Noted: 2024-10-04

## 2024-10-04 PROBLEM — E78.5 HYPERLIPIDEMIA, UNSPECIFIED: Chronic | Status: ACTIVE | Noted: 2022-05-22

## 2024-10-04 LAB
ALBUMIN SERPL ELPH-MCNC: 4.2 G/DL — SIGNIFICANT CHANGE UP (ref 3.5–5.2)
ALP SERPL-CCNC: 105 U/L — SIGNIFICANT CHANGE UP (ref 30–115)
ALT FLD-CCNC: 73 U/L — HIGH (ref 0–41)
ANION GAP SERPL CALC-SCNC: 16 MMOL/L — HIGH (ref 7–14)
APPEARANCE UR: ABNORMAL
AST SERPL-CCNC: 264 U/L — HIGH (ref 0–41)
BASOPHILS # BLD AUTO: 0 K/UL — SIGNIFICANT CHANGE UP (ref 0–0.2)
BASOPHILS NFR BLD AUTO: 0 % — SIGNIFICANT CHANGE UP (ref 0–1)
BILIRUB SERPL-MCNC: 0.7 MG/DL — SIGNIFICANT CHANGE UP (ref 0.2–1.2)
BILIRUB UR-MCNC: ABNORMAL
BUN SERPL-MCNC: 45 MG/DL — HIGH (ref 10–20)
CALCIUM SERPL-MCNC: 9 MG/DL — SIGNIFICANT CHANGE UP (ref 8.4–10.4)
CHLORIDE SERPL-SCNC: 97 MMOL/L — LOW (ref 98–110)
CO2 SERPL-SCNC: 22 MMOL/L — SIGNIFICANT CHANGE UP (ref 17–32)
COLOR SPEC: SIGNIFICANT CHANGE UP
CREAT SERPL-MCNC: 2.6 MG/DL — HIGH (ref 0.7–1.5)
DIFF PNL FLD: ABNORMAL
EGFR: 24 ML/MIN/1.73M2 — LOW
EOSINOPHIL # BLD AUTO: 0 K/UL — SIGNIFICANT CHANGE UP (ref 0–0.7)
EOSINOPHIL NFR BLD AUTO: 0 % — SIGNIFICANT CHANGE UP (ref 0–8)
FLUAV AG NPH QL: SIGNIFICANT CHANGE UP
FLUBV AG NPH QL: SIGNIFICANT CHANGE UP
GAS PNL BLDV: SIGNIFICANT CHANGE UP
GLUCOSE SERPL-MCNC: 208 MG/DL — HIGH (ref 70–99)
GLUCOSE UR QL: NEGATIVE MG/DL — SIGNIFICANT CHANGE UP
HCT VFR BLD CALC: 37.8 % — LOW (ref 42–52)
HGB BLD-MCNC: 12.4 G/DL — LOW (ref 14–18)
KETONES UR-MCNC: ABNORMAL MG/DL
LEUKOCYTE ESTERASE UR-ACNC: ABNORMAL
LYMPHOCYTES # BLD AUTO: 0.54 K/UL — LOW (ref 1.2–3.4)
LYMPHOCYTES # BLD AUTO: 2.7 % — LOW (ref 20.5–51.1)
MCHC RBC-ENTMCNC: 31.6 PG — HIGH (ref 27–31)
MCHC RBC-ENTMCNC: 32.8 G/DL — SIGNIFICANT CHANGE UP (ref 32–37)
MCV RBC AUTO: 96.4 FL — HIGH (ref 80–94)
MONOCYTES # BLD AUTO: 1.06 K/UL — HIGH (ref 0.1–0.6)
MONOCYTES NFR BLD AUTO: 5.3 % — SIGNIFICANT CHANGE UP (ref 1.7–9.3)
NEUTROPHILS # BLD AUTO: 18.12 K/UL — HIGH (ref 1.4–6.5)
NEUTROPHILS NFR BLD AUTO: 86.6 % — HIGH (ref 42.2–75.2)
NITRITE UR-MCNC: NEGATIVE — SIGNIFICANT CHANGE UP
NT-PROBNP SERPL-SCNC: 1655 PG/ML — HIGH (ref 0–300)
PH UR: 5.5 — SIGNIFICANT CHANGE UP (ref 5–8)
PLATELET # BLD AUTO: 226 K/UL — SIGNIFICANT CHANGE UP (ref 130–400)
PMV BLD: 11 FL — HIGH (ref 7.4–10.4)
POTASSIUM SERPL-MCNC: 4.3 MMOL/L — SIGNIFICANT CHANGE UP (ref 3.5–5)
POTASSIUM SERPL-SCNC: 4.3 MMOL/L — SIGNIFICANT CHANGE UP (ref 3.5–5)
PROT SERPL-MCNC: 6.8 G/DL — SIGNIFICANT CHANGE UP (ref 6–8)
PROT UR-MCNC: 300 MG/DL
RBC # BLD: 3.92 M/UL — LOW (ref 4.7–6.1)
RBC # FLD: 12.4 % — SIGNIFICANT CHANGE UP (ref 11.5–14.5)
RSV RNA NPH QL NAA+NON-PROBE: SIGNIFICANT CHANGE UP
SARS-COV-2 RNA SPEC QL NAA+PROBE: SIGNIFICANT CHANGE UP
SODIUM SERPL-SCNC: 135 MMOL/L — SIGNIFICANT CHANGE UP (ref 135–146)
SP GR SPEC: 1.02 — SIGNIFICANT CHANGE UP (ref 1–1.03)
TROPONIN T, HIGH SENSITIVITY RESULT: 344 NG/L — CRITICAL HIGH (ref 6–21)
TROPONIN T, HIGH SENSITIVITY RESULT: 370 NG/L — CRITICAL HIGH (ref 6–21)
UROBILINOGEN FLD QL: 1 MG/DL — SIGNIFICANT CHANGE UP (ref 0.2–1)
WBC # BLD: 20.09 K/UL — HIGH (ref 4.8–10.8)
WBC # FLD AUTO: 20.09 K/UL — HIGH (ref 4.8–10.8)

## 2024-10-04 PROCEDURE — 94002 VENT MGMT INPAT INIT DAY: CPT

## 2024-10-04 PROCEDURE — 73030 X-RAY EXAM OF SHOULDER: CPT | Mod: LT

## 2024-10-04 PROCEDURE — 84443 ASSAY THYROID STIM HORMONE: CPT

## 2024-10-04 PROCEDURE — 93880 EXTRACRANIAL BILAT STUDY: CPT

## 2024-10-04 PROCEDURE — 70450 CT HEAD/BRAIN W/O DYE: CPT | Mod: 26,MC

## 2024-10-04 PROCEDURE — 99285 EMERGENCY DEPT VISIT HI MDM: CPT | Mod: FS

## 2024-10-04 PROCEDURE — 71045 X-RAY EXAM CHEST 1 VIEW: CPT | Mod: 26

## 2024-10-04 PROCEDURE — 85730 THROMBOPLASTIN TIME PARTIAL: CPT

## 2024-10-04 PROCEDURE — 97162 PT EVAL MOD COMPLEX 30 MIN: CPT | Mod: GP

## 2024-10-04 PROCEDURE — 80048 BASIC METABOLIC PNL TOTAL CA: CPT

## 2024-10-04 PROCEDURE — 86923 COMPATIBILITY TEST ELECTRIC: CPT

## 2024-10-04 PROCEDURE — 87389 HIV-1 AG W/HIV-1&-2 AB AG IA: CPT

## 2024-10-04 PROCEDURE — C1729: CPT

## 2024-10-04 PROCEDURE — A9500: CPT

## 2024-10-04 PROCEDURE — 84295 ASSAY OF SERUM SODIUM: CPT

## 2024-10-04 PROCEDURE — 82330 ASSAY OF CALCIUM: CPT

## 2024-10-04 PROCEDURE — 81001 URINALYSIS AUTO W/SCOPE: CPT

## 2024-10-04 PROCEDURE — 84484 ASSAY OF TROPONIN QUANT: CPT

## 2024-10-04 PROCEDURE — 83605 ASSAY OF LACTIC ACID: CPT

## 2024-10-04 PROCEDURE — 87086 URINE CULTURE/COLONY COUNT: CPT

## 2024-10-04 PROCEDURE — 76770 US EXAM ABDO BACK WALL COMP: CPT

## 2024-10-04 PROCEDURE — 36415 COLL VENOUS BLD VENIPUNCTURE: CPT

## 2024-10-04 PROCEDURE — 97530 THERAPEUTIC ACTIVITIES: CPT | Mod: GP

## 2024-10-04 PROCEDURE — 94010 BREATHING CAPACITY TEST: CPT

## 2024-10-04 PROCEDURE — 84132 ASSAY OF SERUM POTASSIUM: CPT

## 2024-10-04 PROCEDURE — 36430 TRANSFUSION BLD/BLD COMPNT: CPT

## 2024-10-04 PROCEDURE — 82550 ASSAY OF CK (CPK): CPT

## 2024-10-04 PROCEDURE — 87640 STAPH A DNA AMP PROBE: CPT

## 2024-10-04 PROCEDURE — 86850 RBC ANTIBODY SCREEN: CPT

## 2024-10-04 PROCEDURE — 87186 SC STD MICRODIL/AGAR DIL: CPT

## 2024-10-04 PROCEDURE — 85610 PROTHROMBIN TIME: CPT

## 2024-10-04 PROCEDURE — 71046 X-RAY EXAM CHEST 2 VIEWS: CPT

## 2024-10-04 PROCEDURE — 82962 GLUCOSE BLOOD TEST: CPT

## 2024-10-04 PROCEDURE — 85014 HEMATOCRIT: CPT

## 2024-10-04 PROCEDURE — 85018 HEMOGLOBIN: CPT

## 2024-10-04 PROCEDURE — 93306 TTE W/DOPPLER COMPLETE: CPT

## 2024-10-04 PROCEDURE — 80053 COMPREHEN METABOLIC PANEL: CPT

## 2024-10-04 PROCEDURE — 72125 CT NECK SPINE W/O DYE: CPT | Mod: 26,MC

## 2024-10-04 PROCEDURE — 93005 ELECTROCARDIOGRAM TRACING: CPT

## 2024-10-04 PROCEDURE — 82607 VITAMIN B-12: CPT

## 2024-10-04 PROCEDURE — 82746 ASSAY OF FOLIC ACID SERUM: CPT

## 2024-10-04 PROCEDURE — C1894: CPT

## 2024-10-04 PROCEDURE — 71045 X-RAY EXAM CHEST 1 VIEW: CPT

## 2024-10-04 PROCEDURE — 83010 ASSAY OF HAPTOGLOBIN QUANT: CPT

## 2024-10-04 PROCEDURE — 93458 L HRT ARTERY/VENTRICLE ANGIO: CPT

## 2024-10-04 PROCEDURE — 82728 ASSAY OF FERRITIN: CPT

## 2024-10-04 PROCEDURE — 94640 AIRWAY INHALATION TREATMENT: CPT

## 2024-10-04 PROCEDURE — 83615 LACTATE (LD) (LDH) ENZYME: CPT

## 2024-10-04 PROCEDURE — 82803 BLOOD GASES ANY COMBINATION: CPT

## 2024-10-04 PROCEDURE — 83735 ASSAY OF MAGNESIUM: CPT

## 2024-10-04 PROCEDURE — 86891 AUTOLOGOUS BLOOD OP SALVAGE: CPT

## 2024-10-04 PROCEDURE — C1889: CPT

## 2024-10-04 PROCEDURE — 78452 HT MUSCLE IMAGE SPECT MULT: CPT | Mod: MC

## 2024-10-04 PROCEDURE — 83550 IRON BINDING TEST: CPT

## 2024-10-04 PROCEDURE — 72170 X-RAY EXAM OF PELVIS: CPT | Mod: 26

## 2024-10-04 PROCEDURE — 70551 MRI BRAIN STEM W/O DYE: CPT | Mod: MC

## 2024-10-04 PROCEDURE — P9016: CPT

## 2024-10-04 PROCEDURE — 70486 CT MAXILLOFACIAL W/O DYE: CPT | Mod: 26,MC

## 2024-10-04 PROCEDURE — 83540 ASSAY OF IRON: CPT

## 2024-10-04 PROCEDURE — P9045: CPT

## 2024-10-04 PROCEDURE — 71275 CT ANGIOGRAPHY CHEST: CPT | Mod: 26,MC

## 2024-10-04 PROCEDURE — 82977 ASSAY OF GGT: CPT

## 2024-10-04 PROCEDURE — 76705 ECHO EXAM OF ABDOMEN: CPT

## 2024-10-04 PROCEDURE — C1751: CPT

## 2024-10-04 PROCEDURE — 86901 BLOOD TYPING SEROLOGIC RH(D): CPT

## 2024-10-04 PROCEDURE — 74177 CT ABD & PELVIS W/CONTRAST: CPT | Mod: 26,MC

## 2024-10-04 PROCEDURE — 84100 ASSAY OF PHOSPHORUS: CPT

## 2024-10-04 PROCEDURE — 93017 CV STRESS TEST TRACING ONLY: CPT

## 2024-10-04 PROCEDURE — 85027 COMPLETE CBC AUTOMATED: CPT

## 2024-10-04 PROCEDURE — 80061 LIPID PANEL: CPT

## 2024-10-04 PROCEDURE — 95819 EEG AWAKE AND ASLEEP: CPT

## 2024-10-04 PROCEDURE — 87641 MR-STAPH DNA AMP PROBE: CPT

## 2024-10-04 PROCEDURE — 86900 BLOOD TYPING SEROLOGIC ABO: CPT

## 2024-10-04 PROCEDURE — 85025 COMPLETE CBC W/AUTO DIFF WBC: CPT

## 2024-10-04 PROCEDURE — 82553 CREATINE MB FRACTION: CPT

## 2024-10-04 PROCEDURE — C1887: CPT

## 2024-10-04 PROCEDURE — 93010 ELECTROCARDIOGRAM REPORT: CPT | Mod: 76

## 2024-10-04 PROCEDURE — 97116 GAIT TRAINING THERAPY: CPT | Mod: GP

## 2024-10-04 PROCEDURE — 80074 ACUTE HEPATITIS PANEL: CPT

## 2024-10-04 PROCEDURE — 82010 KETONE BODYS QUAN: CPT

## 2024-10-04 PROCEDURE — 83036 HEMOGLOBIN GLYCOSYLATED A1C: CPT

## 2024-10-04 PROCEDURE — C1769: CPT

## 2024-10-04 RX ORDER — SODIUM CHLORIDE IRRIG SOLUTION 0.9 %
1000 SOLUTION, IRRIGATION IRRIGATION ONCE
Refills: 0 | Status: COMPLETED | OUTPATIENT
Start: 2024-10-04 | End: 2024-10-04

## 2024-10-04 RX ORDER — SODIUM CHLORIDE IRRIG SOLUTION 0.9 %
1700 SOLUTION, IRRIGATION IRRIGATION ONCE
Refills: 0 | Status: COMPLETED | OUTPATIENT
Start: 2024-10-04 | End: 2024-10-04

## 2024-10-04 RX ORDER — CEFTRIAXONE SODIUM 1 G
1000 VIAL (EA) INJECTION ONCE
Refills: 0 | Status: COMPLETED | OUTPATIENT
Start: 2024-10-04 | End: 2024-10-04

## 2024-10-04 RX ADMIN — Medication 100 MILLIGRAM(S): at 21:53

## 2024-10-04 RX ADMIN — Medication 1700 MILLILITER(S): at 22:36

## 2024-10-04 RX ADMIN — Medication 1000 MILLILITER(S): at 23:00

## 2024-10-04 RX ADMIN — Medication 1000 MILLILITER(S): at 21:54

## 2024-10-04 NOTE — ED PROVIDER NOTE - PROGRESS NOTE DETAILS
Please check with the patient KA - CT resulted no PE. Cystitis on CT. Pending UA. Trop 370 with EKG changes. Pending repeat Trop. KA - Cardio telemetry evaluated patient. Recommended admission to medicine telemetry due to other comorbidities. Cardio fellow aware and will see patient. Sepsis 2150.

## 2024-10-04 NOTE — ED PROVIDER NOTE - OBJECTIVE STATEMENT
Patient is an 84 year old male with pmhx of htn, hld, dm, asthma, gerd presents after syncope with fall yesterday. He had unknown head injury but admits to hitting his frontal head when trying to stand up after syncope. He also admits to left sided rib "soreness". He denies any fever, cough, sore throat, N/V, endorsed chest pain, shortness of breath, abdominal pain, or urinary complaints.

## 2024-10-04 NOTE — ED PROVIDER NOTE - PHYSICAL EXAMINATION
As Follows:  CONST: Well appearing in NAD  EYES: PERRL, EOMI, Sclera and conjunctiva clear.   ENT: No nasal discharge. Oropharynx normal appearing, no erythema or exudates. Uvula midline  CARD: No murmurs, rubs, or gallops; Normal rate and rhythm  RESP: BS Equal B/L, No wheezes, rhonchi or rales. No distress or accessory breathing  GI: Soft, non-tender, non-distended.  MS: Nontender pelvis chest wall, upper/lower extremities. Normal ROM in all extremities. No midline Cervical/Thoracic/Lumbar spinal tenderness.  SKIN: Warm, dry, no acute rashes. MMM  NEURO: A&Ox3, No focal deficits. Strength and sensation intact. Steady gait

## 2024-10-04 NOTE — ED PROVIDER NOTE - CARE PLAN
Principal Discharge DX:	Elevated troponin  Secondary Diagnosis:	Abnormal EKG  Secondary Diagnosis:	Acute cystitis  Secondary Diagnosis:	ARIELLE (acute kidney injury)   1

## 2024-10-04 NOTE — ED ADULT TRIAGE NOTE - CHIEF COMPLAINT QUOTE
Pt c/o fell yesterday down 1 step around 3pm. LOC unknown. Pt does not recall falling. Complains of L rib pain. Ambulatory w steady gait in triage

## 2024-10-04 NOTE — ED ADULT NURSE NOTE - OBJECTIVE STATEMENT
Pt c/o fell yesterday down 1 step around 3pm. LOC unknown. Pt does not recall falling. Complains of L rib pain. Ambulatory w steady gait in triage Pt c/o fell yesterday down 1 step around 3pm. LOC unknown. Pt does not recall falling. Complains of L rib, knee, and shoulder pain.

## 2024-10-04 NOTE — ED ADULT NURSE REASSESSMENT NOTE - NS ED NURSE REASSESS COMMENT FT1
Pt A&O4. NAD.   18g patent on the right forearm. 1700cc of LR running.   pt transferred to  safely on cardiac monitoring.

## 2024-10-04 NOTE — ED ADULT NURSE NOTE - SUICIDE SCREENING QUESTION 2
ASSESSMENT  59y M admitted with Gangrene, not elsewhere classified      CAD (coronary artery disease)    DVT (deep venous thrombosis)    ESRD on dialysis    DM (diabetes mellitus)    Morbid obesity    Peripheral vascular disease        IMPRESSION  #  #Severe Sepsis on admission  #Lactic acidosis  #Hyponatremia   #Obesity BMI (kg/m2): 33.2  #DM     RECOMMENDATIONS  This is an incomplete consult note. All final recommendations to follow after interview and examination of the patient. Please follow recommendations noted below.    If any questions, please send a message or call on [employer] Teams  Please continue to update ID with any pertinent new laboratory or radiographic findings.    Denisse Cruz M.D  Infectious Diseases Attending  [facility]    ASSESSMENT  59 with PMH of CAD, DVT, on ESRD on dialysis, DM, peripheral vascular disease and DM is admitted for dry gangrene of right lower extremity. Reports it started after he stepped his walker on toes.     IMPRESSION  #Dry Gangrene.   #Right phalangeal Osteomyelitis   #ESRD  #PAD  #DM  #CAD  #Obesity BMI (kg/m2): 33.2  #Questionable allergy to aztreonam? Doubt if this is real. Perhaps it was related to vancomycin infusion reaction more likely.     RECOMMENDATIONS  -Patient was HDS at the time admission. Ideally we can potentially hold off any antimicrobials in these clinical cases to increase the yield of cultures. There is no urgency to give antibiotics. Antibiotics will not fix the dry gangrene.  -Follow up with arterial duplex.   -Follow up with blood cultures.  -Patient would benefit from amputation. Follow up with podiatry and vascular evaluation. The tissue should be sent for deep cultures and pathology.   -For now continue with IV vancomycin. Dose by levels.   -Continue with IV Zosyn 3.375 gram q 12 hours extended infusion.   -Strict DM control. Offloading.     If any questions, please send a message or call on [employer] Teams  Please continue to update ID with any pertinent new laboratory or radiographic findings.    Denisse Cruz M.D  Infectious Diseases Attending  [facility]    No

## 2024-10-04 NOTE — ED ADULT NURSE NOTE - NSFALLRISKINTERV_ED_ALL_ED
Communicate fall risk and risk factors to all staff, patient, and family/Provide visual cue: yellow wristband, yellow gown, etc/Reinforce activity limits and safety measures with patient and family/Call bell, personal items and telephone in reach/Instruct patient to call for assistance before getting out of bed/chair/stretcher/Non-slip footwear applied when patient is off stretcher/[place] to call system/Physically safe environment - no spills, clutter or unnecessary equipment/Purposeful Proactive Rounding/Room/bathroom lighting operational, light cord in reach

## 2024-10-04 NOTE — ED ADULT NURSE NOTE - CAS TRG GENERAL NORM CIRC DET
2021         RE: Fely Clinton  [address] [facility] [id number]        Dear Colleague,    Thank you for referring your patient, Fely Clinton, to the [facility]. Please see a copy of my visit note below.    Fely Clinton is an extremely pleasant 16 year old year old female patient here today for recheck acne vulgaris. Patient notes acne did improve with doxycycline but reports that acne has been flaring recently since she has been off. She notes bpo wash and tretinoin has not been helping. She is interested in isotretinoin..  Remainder of the HPI, Meds, PMH, Allergies, FH, and SH was reviewed in chart.    Pertinent Hx:   Acne Vulgaris  Past Medical History:   Diagnosis Date     NO ACTIVE PROBLEMS        Past Surgical History:   Procedure Laterality Date     LAPAROSCOPY DIAGNOSTIC CHILD  2012    Procedure: LAPAROSCOPY DIAGNOSTIC CHILD;  Exploratory Laparoscopy, Bilateral Ovarian Biopsies, Right Ovarian Pexy.;  Surgeon: Ian Rasmussen MD;  Location: [facility] OR     NO HISTORY OF SURGERY          Family History   Problem Relation Age of Onset     Cancer Maternal Grandmother         skin     Hypertension Maternal Grandmother      Heart Disease Maternal Grandfather         atrial fibrillation, polycythemia     Heart Disease Paternal Grandfather         bradycardia with pacemaker     Hypertension Maternal Uncle      Neurologic Disorder Maternal Uncle         mytonic dystrophy (with mitral valve prolapse) now      Glaucoma No family hx of      Macular Degeneration No family hx of        Social History     Socioeconomic History     Marital status: Single     Spouse name: Not on file     Number of children: Not on file     Years of education: Not on file     Highest education level: Not on file   Occupational History     Employer: CH[employer]   Tobacco Use     Smoking status: Never Smoker     Smokeless tobacco: Never Used   Substance and Sexual Activity      Alcohol use: No     Drug use: No     Sexual activity: Never   Other Topics Concern      Service No     Blood Transfusions No     Caffeine Concern No     Occupational Exposure No     Hobby Hazards No     Sleep Concern No     Stress Concern No     Weight Concern No     Special Diet No     Back Care No     Exercise No     Bike Helmet No     Seat Belt No     Self-Exams No   Social History Narrative    Lives with parents and 3 older brothers.  She is being homeschooled and is in the 1st grade.     Social Determinants of Health     Financial Resource Strain:      Difficulty of Paying Living Expenses:    Food Insecurity:      Worried About Running Out of Food in the Last Year:      Ran Out of Food in the Last Year:    Transportation Needs:      Lack of Transportation (Medical):      Lack of Transportation (Non-Medical):    Physical Activity:      Days of Exercise per Week:      Minutes of Exercise per Session:    Stress:      Feeling of Stress :    Intimate Partner Violence:      Fear of Current or Ex-Partner:      Emotionally Abused:      Physically Abused:      Sexually Abused:        Outpatient Encounter Medications as of 11/4/2021   Medication Sig Dispense Refill     Acetaminophen (TYLENOL PO) Take by mouth as needed        clindamycin (CLEOCIN T) 1 % external lotion Apply to face in the morning. 60 mL 5     IBUPROFEN PO Take 200 mg by mouth as needed 1 tab twice daily        melatonin 3 MG tablet Take 1 mg by mouth nightly as needed for sleep       norgestim-eth estrad triphasic (ORTHO TRI-CYCLEN) 0.18/0.215/0.25 MG-35 MCG tablet Take 1 tablet by mouth daily 84 tablet 3     tretinoin (RETIN-A) 0.025 % external cream Apply pea sized amount at bedtime. 45 g 5     doxycycline monohydrate (MONODOX) 100 MG capsule Take 1 capsule (100 mg) by mouth 2 times daily (with meals) (Patient not taking: Reported on 11/4/2021) 180 capsule 0     polyethylene glycol (MIRALAX/GLYCOLAX) Packet Take 17 g by mouth as needed   (Patient not taking: Reported on 11/4/2021)       No facility-administered encounter medications on file as of 11/4/2021.             O:   NAD, WDWN, Alert & Oriented, Mood & Affect wnl, Vitals stable   Here today with her mother    /62 (BP Location: Left arm, Patient Position: Sitting, Cuff Size: Adult Regular)   Pulse 67   SpO2 98%    General appearance normal   Vitals stable   Alert, oriented and in no acute distress   2+ inflammatory papules on face       Eyes: Conjunctivae/lids:Normal     ENT: Lips: normal    MSK:Normal    Pulm: Breathing Normal    Neuro/Psych: Orientation:Alert and Orientedx3 ; Mood/Affect:normal   A/P:  1.  Acne Vulgaris  Standing CBC, CMP and fasting lipids  Ipledge reviewed with patient and Ipledge consent form complete  Patient place in ipledge system  Ipledge: [phone]  Goal dosage: 11,590 mg   Return to clinic 30 days  Dry lips and mouth, minor swelling of the eyelids or lips, crusty skin, nosebleeds, GI upset, or thinning of hair may occur. If any of these effects persist or worsen, tell your doctor or pharmacist promptly.   To relieve dry mouth, suck on (sugarless) hard candy or ice chips, chew (sugarless) gum, drink water.   Remember that your doctor has prescribed this medication because he or she has judged that the benefit to you is greater than the risk of side effects. Many people using this medication do not have serious side effects.   Contact office immediately if you have any of these unlikely but serious side effects: mental/mood changes (e.g., depression,  aggressive or violent behavior, and in rare cases, thoughts of suicide), tingling feeling in the skin, quick/severe sun sensitivity, back/joint/muscle pain, signs of infection (e.g., fever, persistent sore throat, painful swallowing, peeling skin on palms/soles.   Isotretinoin may infrequently cause disease of the pancreatitis, that may rarely be fatal. Stop taking this medication and contact office immediately  if you develop: severe stomach pain severe or persistent GI upset,   Stop taking this medication and tell your doctor immediately if you develop these unlikely but very serious side effects: severe headache, vision changes, ear ringing, hearling loss, chest pain, yellowing eyes, skin, dark urine, severe diarrhea, rectal bleeding,   Seek immediate medical attention if you notice any symptoms of a serious allergic reaction.    Accutane is discussed fully with the patient. It is a very effective drug to treat acne vulgaris but has many potential significant side effects. Chief among these are teratogensis, hepatic injury, dyslipidemia and severe drying of the mucous membranes. All of these issues have been discussed in details. Monthly blood tests to monitor lipids and liver functions will be necessary. Expect painful dryness and/or fissuring around the lips, eyes, and other moist areas of the body. Balms may be protective. Contact lens may be too painful to wear temporarily while on this drug. Episodes of significant depression have been reported, including suicidal ideation and attempts in rare cases. It may also cause pseudotumor cerebri and hyperostosis. The patient will report any such changes in mood, depressive symptoms or suicidal thoughts, headaches, joint or bone pains. There is also a possible association with inflammatory bowel disease, although this is unproven at this point.           Again, thank you for allowing me to participate in the care of your patient.        Sincerely,        Maddy Blount PA-C     Strong peripheral pulses

## 2024-10-04 NOTE — ED PROVIDER NOTE - CLINICAL SUMMARY MEDICAL DECISION MAKING FREE TEXT BOX
84-year-old male past medical history hypertension hyperlipidemia presents with syncopal fall unknown head injury    Cystitis on CT Will admit for acute cystitis ARIELLE elevated troponin    EKG independently reviewed by me Archie Botello shows T wave inversions in inferior leads    X-ray independently reviewed by me shows no cardiopulmonary disease    Appropriate medications for patient's presenting complaints were ordered and effects were reassessed.  Patient's external records were reviewed.     Escalation to admission/observation was considered.  At this time, patient requires inpatient hospitalization .

## 2024-10-05 ENCOUNTER — RESULT REVIEW (OUTPATIENT)
Age: 84
End: 2024-10-05

## 2024-10-05 LAB
A1C WITH ESTIMATED AVERAGE GLUCOSE RESULT: 7.8 % — HIGH (ref 4–5.6)
ALBUMIN SERPL ELPH-MCNC: 3.5 G/DL — SIGNIFICANT CHANGE UP (ref 3.5–5.2)
ALP SERPL-CCNC: 81 U/L — SIGNIFICANT CHANGE UP (ref 30–115)
ALT FLD-CCNC: 62 U/L — HIGH (ref 0–41)
ANION GAP SERPL CALC-SCNC: 12 MMOL/L — SIGNIFICANT CHANGE UP (ref 7–14)
AST SERPL-CCNC: 182 U/L — HIGH (ref 0–41)
BASOPHILS # BLD AUTO: 0.02 K/UL — SIGNIFICANT CHANGE UP (ref 0–0.2)
BASOPHILS # BLD AUTO: 0.03 K/UL — SIGNIFICANT CHANGE UP (ref 0–0.2)
BASOPHILS NFR BLD AUTO: 0.2 % — SIGNIFICANT CHANGE UP (ref 0–1)
BASOPHILS NFR BLD AUTO: 0.2 % — SIGNIFICANT CHANGE UP (ref 0–1)
BILIRUB SERPL-MCNC: 0.6 MG/DL — SIGNIFICANT CHANGE UP (ref 0.2–1.2)
BUN SERPL-MCNC: 44 MG/DL — HIGH (ref 10–20)
CALCIUM SERPL-MCNC: 8.4 MG/DL — SIGNIFICANT CHANGE UP (ref 8.4–10.5)
CHLORIDE SERPL-SCNC: 100 MMOL/L — SIGNIFICANT CHANGE UP (ref 98–110)
CK MB CFR SERPL CALC: 17.5 NG/ML — HIGH (ref 0.6–6.3)
CK SERPL-CCNC: 6468 U/L — HIGH (ref 0–225)
CO2 SERPL-SCNC: 23 MMOL/L — SIGNIFICANT CHANGE UP (ref 17–32)
CREAT SERPL-MCNC: 2.2 MG/DL — HIGH (ref 0.7–1.5)
EGFR: 29 ML/MIN/1.73M2 — LOW
EOSINOPHIL # BLD AUTO: 0.05 K/UL — SIGNIFICANT CHANGE UP (ref 0–0.7)
EOSINOPHIL # BLD AUTO: 0.08 K/UL — SIGNIFICANT CHANGE UP (ref 0–0.7)
EOSINOPHIL NFR BLD AUTO: 0.4 % — SIGNIFICANT CHANGE UP (ref 0–8)
EOSINOPHIL NFR BLD AUTO: 0.6 % — SIGNIFICANT CHANGE UP (ref 0–8)
ESTIMATED AVERAGE GLUCOSE: 177 MG/DL — HIGH (ref 68–114)
GGT SERPL-CCNC: 23 U/L — SIGNIFICANT CHANGE UP (ref 1–40)
GLUCOSE BLDC GLUCOMTR-MCNC: 170 MG/DL — HIGH (ref 70–99)
GLUCOSE BLDC GLUCOMTR-MCNC: 190 MG/DL — HIGH (ref 70–99)
GLUCOSE BLDC GLUCOMTR-MCNC: 200 MG/DL — HIGH (ref 70–99)
GLUCOSE BLDC GLUCOMTR-MCNC: 235 MG/DL — HIGH (ref 70–99)
GLUCOSE SERPL-MCNC: 168 MG/DL — HIGH (ref 70–99)
HCT VFR BLD CALC: 31.4 % — LOW (ref 42–52)
HCT VFR BLD CALC: 33 % — LOW (ref 42–52)
HGB BLD-MCNC: 10.4 G/DL — LOW (ref 14–18)
HGB BLD-MCNC: 11 G/DL — LOW (ref 14–18)
IMM GRANULOCYTES NFR BLD AUTO: 0.6 % — HIGH (ref 0.1–0.3)
IMM GRANULOCYTES NFR BLD AUTO: 0.8 % — HIGH (ref 0.1–0.3)
LYMPHOCYTES # BLD AUTO: 0.96 K/UL — LOW (ref 1.2–3.4)
LYMPHOCYTES # BLD AUTO: 1.01 K/UL — LOW (ref 1.2–3.4)
LYMPHOCYTES # BLD AUTO: 6.7 % — LOW (ref 20.5–51.1)
LYMPHOCYTES # BLD AUTO: 8.4 % — LOW (ref 20.5–51.1)
MAGNESIUM SERPL-MCNC: 2.1 MG/DL — SIGNIFICANT CHANGE UP (ref 1.8–2.4)
MCHC RBC-ENTMCNC: 31.6 PG — HIGH (ref 27–31)
MCHC RBC-ENTMCNC: 32 PG — HIGH (ref 27–31)
MCHC RBC-ENTMCNC: 33.1 G/DL — SIGNIFICANT CHANGE UP (ref 32–37)
MCHC RBC-ENTMCNC: 33.3 G/DL — SIGNIFICANT CHANGE UP (ref 32–37)
MCV RBC AUTO: 94.8 FL — HIGH (ref 80–94)
MCV RBC AUTO: 96.6 FL — HIGH (ref 80–94)
MONOCYTES # BLD AUTO: 1.01 K/UL — HIGH (ref 0.1–0.6)
MONOCYTES # BLD AUTO: 1.42 K/UL — HIGH (ref 0.1–0.6)
MONOCYTES NFR BLD AUTO: 10 % — HIGH (ref 1.7–9.3)
MONOCYTES NFR BLD AUTO: 8.4 % — SIGNIFICANT CHANGE UP (ref 1.7–9.3)
NEUTROPHILS # BLD AUTO: 11.64 K/UL — HIGH (ref 1.4–6.5)
NEUTROPHILS # BLD AUTO: 9.87 K/UL — HIGH (ref 1.4–6.5)
NEUTROPHILS NFR BLD AUTO: 81.7 % — HIGH (ref 42.2–75.2)
NEUTROPHILS NFR BLD AUTO: 82 % — HIGH (ref 42.2–75.2)
NRBC # BLD: 0 /100 WBCS — SIGNIFICANT CHANGE UP (ref 0–0)
NRBC # BLD: 0 /100 WBCS — SIGNIFICANT CHANGE UP (ref 0–0)
PLATELET # BLD AUTO: 178 K/UL — SIGNIFICANT CHANGE UP (ref 130–400)
PLATELET # BLD AUTO: 182 K/UL — SIGNIFICANT CHANGE UP (ref 130–400)
PMV BLD: 10.4 FL — SIGNIFICANT CHANGE UP (ref 7.4–10.4)
PMV BLD: 10.7 FL — HIGH (ref 7.4–10.4)
POTASSIUM SERPL-MCNC: 3.8 MMOL/L — SIGNIFICANT CHANGE UP (ref 3.5–5)
POTASSIUM SERPL-SCNC: 3.8 MMOL/L — SIGNIFICANT CHANGE UP (ref 3.5–5)
PROT SERPL-MCNC: 5.3 G/DL — LOW (ref 6–8)
RBC # BLD: 3.25 M/UL — LOW (ref 4.7–6.1)
RBC # BLD: 3.48 M/UL — LOW (ref 4.7–6.1)
RBC # FLD: 12.2 % — SIGNIFICANT CHANGE UP (ref 11.5–14.5)
RBC # FLD: 12.3 % — SIGNIFICANT CHANGE UP (ref 11.5–14.5)
SODIUM SERPL-SCNC: 135 MMOL/L — SIGNIFICANT CHANGE UP (ref 135–146)
TROPONIN T, HIGH SENSITIVITY RESULT: 274 NG/L — CRITICAL HIGH (ref 6–21)
TROPONIN T, HIGH SENSITIVITY RESULT: 280 NG/L — CRITICAL HIGH (ref 6–21)
TSH SERPL-MCNC: 3.72 UIU/ML — SIGNIFICANT CHANGE UP (ref 0.27–4.2)
WBC # BLD: 12.03 K/UL — HIGH (ref 4.8–10.8)
WBC # BLD: 14.24 K/UL — HIGH (ref 4.8–10.8)
WBC # FLD AUTO: 12.03 K/UL — HIGH (ref 4.8–10.8)
WBC # FLD AUTO: 14.24 K/UL — HIGH (ref 4.8–10.8)

## 2024-10-05 PROCEDURE — 99223 1ST HOSP IP/OBS HIGH 75: CPT

## 2024-10-05 PROCEDURE — 76770 US EXAM ABDO BACK WALL COMP: CPT | Mod: 26

## 2024-10-05 PROCEDURE — 93306 TTE W/DOPPLER COMPLETE: CPT | Mod: 26

## 2024-10-05 PROCEDURE — 76705 ECHO EXAM OF ABDOMEN: CPT | Mod: 26

## 2024-10-05 PROCEDURE — 93880 EXTRACRANIAL BILAT STUDY: CPT | Mod: 26

## 2024-10-05 PROCEDURE — 95816 EEG AWAKE AND DROWSY: CPT | Mod: 26

## 2024-10-05 PROCEDURE — 70551 MRI BRAIN STEM W/O DYE: CPT | Mod: 26

## 2024-10-05 PROCEDURE — 73030 X-RAY EXAM OF SHOULDER: CPT | Mod: 26,LT

## 2024-10-05 RX ORDER — INSULIN LISPRO 100/ML
3 VIAL (ML) SUBCUTANEOUS
Refills: 0 | Status: DISCONTINUED | OUTPATIENT
Start: 2024-10-05 | End: 2024-10-11

## 2024-10-05 RX ORDER — ALCOHOL ANTISEPTIC PADS
15 PADS, MEDICATED (EA) TOPICAL ONCE
Refills: 0 | Status: DISCONTINUED | OUTPATIENT
Start: 2024-10-05 | End: 2024-10-11

## 2024-10-05 RX ORDER — INSULIN LISPRO 100/ML
VIAL (ML) SUBCUTANEOUS
Refills: 0 | Status: DISCONTINUED | OUTPATIENT
Start: 2024-10-05 | End: 2024-10-11

## 2024-10-05 RX ORDER — SODIUM CHLORIDE IRRIG SOLUTION 0.9 %
1000 SOLUTION, IRRIGATION IRRIGATION
Refills: 0 | Status: DISCONTINUED | OUTPATIENT
Start: 2024-10-05 | End: 2024-10-06

## 2024-10-05 RX ORDER — ALCOHOL ANTISEPTIC PADS
25 PADS, MEDICATED (EA) TOPICAL ONCE
Refills: 0 | Status: DISCONTINUED | OUTPATIENT
Start: 2024-10-05 | End: 2024-10-11

## 2024-10-05 RX ORDER — ASPIRIN 325 MG
81 TABLET ORAL DAILY
Refills: 0 | Status: DISCONTINUED | OUTPATIENT
Start: 2024-10-05 | End: 2024-10-11

## 2024-10-05 RX ORDER — DAPAGLIFLOZIN 10 MG/1
1 TABLET, FILM COATED ORAL
Refills: 0 | DISCHARGE

## 2024-10-05 RX ORDER — CEFTRIAXONE SODIUM 1 G
1000 VIAL (EA) INJECTION EVERY 24 HOURS
Refills: 0 | Status: COMPLETED | OUTPATIENT
Start: 2024-10-05 | End: 2024-10-09

## 2024-10-05 RX ORDER — GLUCAGON INJECTION, SOLUTION 0.5 MG/.1ML
1 INJECTION, SOLUTION SUBCUTANEOUS ONCE
Refills: 0 | Status: DISCONTINUED | OUTPATIENT
Start: 2024-10-05 | End: 2024-10-11

## 2024-10-05 RX ORDER — INSULIN GLARGINE 300 U/ML
9 INJECTION, SOLUTION SUBCUTANEOUS AT BEDTIME
Refills: 0 | Status: DISCONTINUED | OUTPATIENT
Start: 2024-10-05 | End: 2024-10-10

## 2024-10-05 RX ORDER — SODIUM CHLORIDE IRRIG SOLUTION 0.9 %
1000 SOLUTION, IRRIGATION IRRIGATION
Refills: 0 | Status: DISCONTINUED | OUTPATIENT
Start: 2024-10-05 | End: 2024-10-11

## 2024-10-05 RX ORDER — ALCOHOL ANTISEPTIC PADS
12.5 PADS, MEDICATED (EA) TOPICAL ONCE
Refills: 0 | Status: DISCONTINUED | OUTPATIENT
Start: 2024-10-05 | End: 2024-10-11

## 2024-10-05 RX ADMIN — Medication 5000 UNIT(S): at 17:45

## 2024-10-05 RX ADMIN — Medication 1: at 17:43

## 2024-10-05 RX ADMIN — Medication 3 UNIT(S): at 17:43

## 2024-10-05 RX ADMIN — Medication 5000 UNIT(S): at 06:31

## 2024-10-05 RX ADMIN — Medication 5000 UNIT(S): at 21:37

## 2024-10-05 RX ADMIN — Medication 100 MILLIGRAM(S): at 06:30

## 2024-10-05 RX ADMIN — Medication 81 MILLIGRAM(S): at 21:37

## 2024-10-05 RX ADMIN — Medication 75 MILLILITER(S): at 06:31

## 2024-10-05 RX ADMIN — INSULIN GLARGINE 9 UNIT(S): 300 INJECTION, SOLUTION SUBCUTANEOUS at 21:37

## 2024-10-05 NOTE — CONSULT NOTE ADULT - ASSESSMENT
84y Male with PMHx of hld, htn, and DM, who came to ED for fall. Pt states 2 days ago he was walking to his basement, and when he got to the last step he ended up on his back on the floor. Pt does not recall how he got there or if he had LOC. Pt states following the fall he was trying to get up and noticed his LUE was weak, he stated he was trying to lift it and it would fall back down, pt denies associated LUE pain at the time. Pt endorsed this LUE weakness for approximately a few hours, and noted when he woke up the next morning he had no weakness. On exam NIHSS is 0, CTH reported no acute pathology. Suspect sx may be attributed to TIA, will obtain further imaging to evaluate.    RECS  - MRI brain wo contrast     pending additional recs   84y Male with PMHx of hld, htn, and DM, who came to ED for fall. Pt states 2 days ago he was walking to his basement, and when he got to the last step he ended up on his back on the floor. Pt does not recall how he got there or if he had LOC. Pt states following the fall he was trying to get up and noticed his LUE was weak, he stated he was trying to lift it and it would fall back down, pt denies associated LUE pain at the time. Pt endorsed this LUE weakness for approximately a few hours, and noted when he woke up the next morning he had no weakness. On exam NIHSS is 0, CTH reported no acute pathology. Less likely to be TIA, given associated loss of consciousness not typical w TIA presentation, will obtain further workup and routine eeg for seizure eval.      RECS  - MRI brain wo contrast   - CTA of head and neck   - routine eeg     Discussed w Dr. Webster

## 2024-10-05 NOTE — H&P ADULT - NSHPLABSRESULTS_GEN_ALL_CORE
10-05    135  |  100  |  44[H]  ----------------------------<  168[H]  3.8   |  23  |  2.2[H]    Ca    8.4      05 Oct 2024 05:52  Mg     2.1     10-05    TPro  5.3[L]  /  Alb  3.5  /  TBili  0.6  /  DBili  x   /  AST  182[H]  /  ALT  62[H]  /  AlkPhos  81  10-05                            10.4   14.24 )-----------( 182      ( 05 Oct 2024 05:52 )             31.4       CARDIAC MARKERS ( 05 Oct 2024 09:45 )  x     / x     / x     / x     / 17.5 ng/mL        LIVER FUNCTIONS - ( 05 Oct 2024 09:45 )  Alb: x     / Pro: x     / ALK PHOS: x     / ALT: x     / AST: x     / GGT: 23 U/L             Urinalysis Basic - ( 05 Oct 2024 05:52 )    Color: x / Appearance: x / SG: x / pH: x  Gluc: 168 mg/dL / Ketone: x  / Bili: x / Urobili: x   Blood: x / Protein: x / Nitrite: x   Leuk Esterase: x / RBC: x / WBC x   Sq Epi: x / Non Sq Epi: x / Bacteria: x

## 2024-10-05 NOTE — CONSULT NOTE ADULT - ASSESSMENT
85 yo M with PMHx of HLD, DM, HTN, comes in s/p syncope and fall.     #Syncope   #Sepsis likely secondary to UTI   #Elevated troponin likely type 2 NSTEMI   #AREILLE   Bedside POCUS shows normal IVC, collapsible. Preserved EF, normal LVSF   recommend treating underlying infection   IV fluids for ARIELLE   Check orthostatics   EP consult for syncope   TTE   Trend troponin   will discuss ischemia work up with attending     *THIS IS AN INCOMPLETE NOTE. PENDING EVALUATION BY ATTENDING*

## 2024-10-05 NOTE — H&P ADULT - HISTORY OF PRESENT ILLNESS
84 years old male patient with pmh of HLD, DM, HTN brought to the ed for fall. Hx taken from the patient and is aox3 at time of the interview.  Patient states that he was going down to his basement, and when he got there the next thing he remembers is lying himself on the floor on his back. He does not know wether he lost consciousness or not not but thinks that he lost conciousnes as he does not know what actually happened and how he found himself on the floor. On further inquiry, patient states that he was trying to get up from the floor then he felt that his left shoulder is weak, painful and stiff. on trying to get up from the ground, he state that he hit his head against the ground .  He was finally able to gain his strength back and get up, he went up to his room and slept. His sons called his PCP who advised him to go to the ER. Further review of the systems is negative for any headache, light headiness dizziness palpitations, chest pain, nausea, vomit, diarrhea, dysuria , frequency, urgency, extremity weakness and other significant complaints.

## 2024-10-05 NOTE — CONSULT NOTE ADULT - ATTENDING COMMENTS
84M who had a fall likely due to loss of consciousness on 10/3/24. No preceding symptoms, including chest pain or palpitations but did have left arm weakness after he woke up. Endorses urinary tract symptoms on/off past several days and chills. Found to have ARIELLE, UTI, elevated LFTs and elevated troponin. He denies chest pain/pressure/tightness.     EKG with TWI in inferior leads, new compared to 2022 EKG  Troponin 370 -->274  Pro BNP 1655    Recommend:   Antibiotics and IVF for ARIELLE as per primary  Echo to assess left ventricular systolic function  Start ASA 81 mg daily   Recommend left heart cath once kidney function improves (came in with ARIELLE and received IV contrast 10/4/24)  Neuro on board for left arm weakness  Will follow  Please call cardiology fellow if patient develops chest pain or becomes hemodynamically unstable    Nasrin Mccloud MD  Vascular Cardiology Attending  Please text or call via MS Teams with questions

## 2024-10-05 NOTE — H&P ADULT - NSVTERISKREFERASSESS_GEN_ALL_CORE
Introduction Text (Please End With A Colon): The following procedure was deferred:\\nA) right zygoma - 1.2 cm mobile subcutaneous mass- RO: EIC Procedure To Be Performed At Next Visit: Excision X Size Of Lesion In Cm (Optional): 0 Refer to the Assessment tab to view/cancel completed assessment. Size Of Lesion In Cm (Optional): 1.2 Detail Level: Detailed

## 2024-10-05 NOTE — H&P ADULT - ASSESSMENT
#Syncope   #Sepsis likely secondary to UTI   #Elevated troponin likely type 2 NSTEMI   #ARIELLE   Bedside POCUS shows normal IVC, collapsible. Preserved EF, normal LVSF   recommend treating underlying infection   IV fluids for ARIELLE   Check orthostatics   EP consult for syncope   TTE   Trend troponin   will discuss ischemia work up with attending    84 years old male patient with pmh of HLD, DM, HTN brought to the ed for fall and suspected syncope       #Syncope   #Elevated troponin likely type 2 NSTEMI   - EKG: EKG with TWIs in inferolateral leads   - Trops--  370--> 344--> 280   - trend trop  - serial ekgs  - orthostatic vitals  - tele monitor's  - TTE   - EP consult for syncope   - cardiology consult     #UTI   #ARIELLE/ATN  - sirs and sepsis absent on admission   - wbc 20k  - urine analysis + Wbc, leukocytes, casts, rbc, bacterias  - creat 2.6 ( baseline 1.2) bun 45   - iv fluids. s/p 2700 ml in ed. LR 75 ml/hr    - avoid nephrotoxic meds  - iv ceftriaxone   - fu blood and urine cultures   - US kub rule out hydronephrosis and CKD     #Transaminitis  - , ALT 73  - fu hepatitis panel  - US ruq  - Hold home Lipitor  - avoid hepatoxic meds       #HTN  - hold home ramipril due to arielle    #DM  - hold home oral meds  - target blood sugar 140 180  - start insulin if needed     #HLD  - hold home Lipitor  - restart once lfts stable     #Gout?  - hold allopurinol due to arielle     DVT PPX: LOVENOX   GI PPX: NONE   DIET: DASH   ACTIVITY:   CODE STATUS: FULL   DISPOSITION:       PLEASE CONFIRM MED REC IN AM                84 years old male patient with pmh of HLD, DM, HTN brought to the ed for fall and suspected syncope       #Syncope   #Elevated troponin likely type 2 NSTEMI   - EKG: EKG with TWIs in inferolateral leads   - Trops--  370--> 344--> 280   - trend trop  - serial ekgs  - orthostatic vitals  - tele monitor's  - TTE   - doppler carotid arteries   - EP consult for syncope   - cardiology consult     #UTI   #ARIELLE/ATN  - sirs and sepsis absent on admission   - wbc 20k  - urine analysis + Wbc, leukocytes, casts, rbc, bacterias  - creat 2.6 ( baseline 1.2) bun 45   - iv fluids. s/p 2700 ml in ed. LR 75 ml/hr    - avoid nephrotoxic meds  - iv ceftriaxone   - fu blood and urine cultures   - US kub rule out hydronephrosis and CKD     #Transaminitis  - , ALT 73  - fu hepatitis panel  - US ruq  - Hold home Lipitor  - avoid hepatoxic meds     #Left shoulder pain and stiffness   - xray left shoulder       #HTN  - hold home ramipril due to arielle    #DM  - hold home oral meds  - target blood sugar 140 180  - start insulin if needed     #HLD  - hold home Lipitor  - restart once lfts stable     #Gout?  - hold allopurinol due to arielle     DVT PPX: heparin   GI PPX: NONE   DIET: DASH   ACTIVITY:   CODE STATUS: FULL   DISPOSITION:       PLEASE CONFIRM MED REC IN AM                84 years old male patient with pmh of HLD, DM, HTN brought to the ed for fall and suspected syncope       #Syncope   #Elevated troponin likely type 2 NSTEMI   #Fall   - EKG: EKG with TWIs in inferolateral leads   - Trops--  370--> 344--> 280   - trend trop  - serial ekgs  - orthostatic vitals  - trauma workup negative  - tele monitor's  - TTE   - doppler carotid arteries   - EP consult for syncope   - cardiology consult     #UTI   #ARIELLE/ATN  - sirs and sepsis absent on admission   - wbc 20k  - urine analysis + Wbc, leukocytes, casts, rbc, bacterias  - ct abdomen:  Findings compatible with urinary bladder infection/cystitis; correlation with urinalysis suggested.  - creat 2.6 ( baseline 1.2) bun 45   - iv fluids. s/p 2700 ml in ed. LR 75 ml/hr    - avoid nephrotoxic meds  - iv ceftriaxone   - fu blood and urine cultures   - US kub rule out hydronephrosis and CKD     #Transaminitis  - , ALT 73  - fu hepatitis panel  - US ruq  - Hold home Lipitor  - avoid hepatoxic meds     #Left shoulder pain and stiffness   - xray left shoulder       #HTN  - hold home ramipril due to arielle    #DM  - hold home oral meds  - target blood sugar 140 180  - start insulin if needed     #HLD  - hold home Lipitor  - restart once lfts stable     #Gout?  - hold allopurinol due to arielle     DVT PPX: heparin   GI PPX: NONE   DIET: DASH   ACTIVITY:   CODE STATUS: FULL   DISPOSITION:       PLEASE CONFIRM MED REC IN AM                84 years old male patient with pmh of HLD, DM, HTN brought to the ed for fall and suspected syncope       #Syncope   #Elevated troponin likely type 2 NSTEMI   #Fall   - EKG: EKG with TWIs in inferolateral leads   - Trops--  370--> 344--> 280   - trend trop  - serial ekgs  - orthostatic vitals  - trauma workup negative  - tele monitor's  - TTE   - doppler carotid arteries   - EP consult for syncope   - cardiology consult     #UTI   #ARIELLE/ATN  - sirs and sepsis absent on admission   - wbc 20k  - urine analysis + Wbc, leukocytes, casts, rbc, bacterias  - ct abdomen:  Findings compatible with urinary bladder infection/cystitis; correlation with urinalysis suggested.  - creat 2.6 ( baseline 1.2) bun 45   - iv fluids. s/p 2700 ml in ed. LR 75 ml/hr    - avoid nephrotoxic meds  - iv ceftriaxone   - fu blood and urine cultures   - US kub rule out hydronephrosis and CKD     #Transaminitis  - , ALT 73  - fu hepatitis panel  - US ruq  - Hold home Lipitor, Ezetimibe   - avoid hepatoxic meds     #Left shoulder pain and stiffness   - xray left shoulder       #HTN  - hold home ramipril due to arielle    #DM  - hold home oral meds  - target blood sugar 140 180  - start insulin if needed     #HLD  - hold home Lipitor,  Ezetimibe   - restart once lfts stable     #Gout?  - hold allopurinol due to arielle     DVT PPX: heparin   GI PPX: NONE   DIET: DASH   ACTIVITY:   CODE STATUS: FULL   DISPOSITION:       PLEASE CONFIRM MED REC IN AM

## 2024-10-05 NOTE — PATIENT PROFILE ADULT - FALL HARM RISK - RISK INTERVENTIONS

## 2024-10-05 NOTE — CONSULT NOTE ADULT - NS ATTEND AMEND GEN_ALL_CORE FT
Functional 85 yo M presenting with syncope without trigger. Also found to have + troponins and abnormal ECG.     Rec  - Monitor on tele  - Follow up 2D echo  - Check orthostatics  - Ischemic work up per cardio  - Check TFTs  - Neuro eval  - Maintain electrolytes K>4.0 Mg >2.0   - Agree with ILR pending above work up
Pt is a 83 yo M with PMHx of HTN, HLD, DM, who presented 2 days after a fall. Stroke team consulted for LUE. Pt states that initially he couldn't move his left arm at all after the fall, but it was been improving. On exam, only note left biceps paresis 4/5, otherwise strength intact throughout. MRI brain without acute ischemia. rEEG without seizure. Possible compression neuropathy due to fall, that is now nearly resolved. May f/u in outpt general neurology clinic for EMG/NCS if symptoms persist. Please call with any further questions or concerns.

## 2024-10-05 NOTE — PROGRESS NOTE ADULT - ASSESSMENT
84 years old male patient with pmh of HLD, DM, HTN brought to the ed for fall and suspected syncope.    #Syncope   #Elevated troponin likely type 2 NSTEMI   - EKG: EKG with TWIs in inferolateral leads   - Trops--  370--> 344--> 280   - Cardio: type II NSTEMI given sepsis, get TTE and EP consult  - EP: check serial orthostatic VS, Check TSH / Free T4 , may benefit from ILR if above work up is negative    plan:  - orthostatic vitals  - telemetry  - eeg  - TTE  - follow up cardiology and EP    - tele monitor's  - TTE   - doppler carotid arteries   - EP consult for syncope   - cardiology consult     #UTI   #ARIELLE/ATN  - sirs and sepsis absent on admission   - wbc 20k  - urine analysis + Wbc, leukocytes, casts, rbc, bacterias  - ct abdomen:  Findings compatible with urinary bladder infection/cystitis; correlation with urinalysis suggested.  - creat 2.6 ( baseline 1.2) bun 45   - iv fluids. s/p 2700 ml in ed. LR 75 ml/hr    - avoid nephrotoxic meds  - iv ceftriaxone   - fu blood and urine cultures   - US kub rule out hydronephrosis and CKD     #Transaminitis  - , ALT 73  - fu hepatitis panel  - US ruq  - Hold home Lipitor, Ezetimibe   - avoid hepatoxic meds     #Fall  #Left shoulder pain and stiffness  #Transient right arm weakness   - xray left shoulder   - trauma workup negative      #HTN  - hold home ramipril due to arielle    #DM  - hold home oral meds  - target blood sugar 140 180  - start insulin if needed     #HLD  - hold home Lipitor,  Ezetimibe   - restart once lfts stable     #Gout?  - hold allopurinol due to arielle     DVT PPX: heparin   GI PPX: NONE   DIET: DASH   ACTIVITY:   CODE STATUS: FULL   DISPOSITION:      84 years old male patient with pmh of HLD, DM, HTN brought to the ed for fall and suspected syncope.    #Syncope   #Elevated troponin likely type 2 NSTEMI   - EKG: EKG with TWIs in inferolateral leads   - Trops--  370--> 344--> 280   - Cardio: type II NSTEMI given sepsis, get TTE and EP consult  - EP: check serial orthostatic VS, Check TSH / Free T4 , may benefit from ILR if above work up is negative    plan:  - orthostatic vitals  - telemetry  - eeg  - TTE  - follow up cardiology and EP    #Fall  #Left shoulder pain and stiffness  #Transient right arm weakness    - trauma workup negative  - had transient right arm weakness that resolved. Neurology consulted: MR brain without contrast, CTA head and neck, eeg    Plan:  - f/u shoulder xray  - f/u MRI brain and eeg, hold off on CTA for now given arielle    #UTI   #ARIELLE/ATN  - sirs and sepsis absent on admission   - wbc 20k  - urine analysis + Wbc, leukocytes, casts, rbc, bacterias  - ct abdomen:  Findings compatible with urinary bladder infection/cystitis; correlation with urinalysis suggested.  - creat 2.6 ( baseline 1.2) bun 45   - iv fluids. s/p 2700 ml in ed. LR 75 ml/hr    - avoid nephrotoxic meds  - iv ceftriaxone   - fu blood and urine cultures   - US kub rule out hydronephrosis and CKD     #Transaminitis  - , ALT 73  - fu hepatitis panel  - US ruq  - Hold home Lipitor, Ezetimibe   - avoid hepatoxic meds       #HTN  - hold home ramipril due to arielle  - nifedipine prn after checking orthostatics    #DM  - hold home oral meds  - target blood sugar 140 180  - start insulin if needed     #HLD  - hold home Lipitor,  Ezetimibe   - restart once lfts stable     #Gout?  - hold allopurinol due to arielle     Pending: Cardio and EP follow up, TTE, EEG, MRI brain   DVT PPX: heparin   GI PPX: NONE   DIET: DASH  CODE STATUS: FULL

## 2024-10-05 NOTE — CONSULT NOTE ADULT - ASSESSMENT
84y Male with h/o HTN, HLD, DM, physically active, independent, was admitted with syncopal episode. Per pt, started walking stairs down to basement, then woke up in the basement, unable to get up right away, left arm pain and weakness. Denies preceding symptoms of dizziness, lightheadedness, palpitations, no prior syncopal events Denies chest discomfort, dyspnea. Never had any cardiac work up. In ED trauma work up including CT head, neck, chest, abd, pelvic were negative for any traumatic injuries.   EP consulted for syncopal events    syncope  abnormal EKG ILW TWI  troponinemia   HTH, HLD DM  former smoker  ARIELLE      con't tele  ischemic work up  given right arm weakness, please complete neuro eval   check serial  orthostatic VS  Check TSH / Free T4   Maintain electrolytes K>4.0 Mg >2.0   may benefit from ILR if above work up is negative  will follow up

## 2024-10-05 NOTE — CONSULT NOTE ADULT - SUBJECTIVE AND OBJECTIVE BOX
HPI:  83 yo M with PMHx of HLD, DM, HTN, comes in s/p syncope and fall. Pt was going down to his basement when he lost consciousness and the next thing he knows is that he is on the basement floor. The last thing he remembers is being on the steps of the basement. Was not down for longer than a few minutes. When he woke up he initially had some weakness in his left arm and had a difficult time getting up. He was finally able to gain his strength back and get up, he went up to his room and slept. His sons called his PCP who advised him to go to the ER. This has never happened to him before. Denies any chest pain or palpitations preceding the event. He has no memory of blacking out or feeling lightheaded. Denies any urinary urgency or burning, reports urinary frequency. Denies any fevers or sick contacts. He is active at baseline and plays golf, denies any dyspnea or chest pain on exertion. Never seen a cardiologist.   Cardiology consulted for syncope and elevated troponin.     PAST MEDICAL & SURGICAL HISTORY  DM (diabetes mellitus)    HTN (hypertension)    HLD (hyperlipidemia)    H/O knee surgery        FAMILY HISTORY:  FAMILY HISTORY:  No pertinent family history in first degree relatives        SOCIAL HISTORY:  former smoker, quit in 1970  lives with his domestic partner     ALLERGIES:  No Known Allergies      MEDICATIONS:  MEDICATIONS  (STANDING):    MEDICATIONS  (PRN):      HOME MEDICATIONS:  Home Medications:  ALLOPURINOL 100 MG TABLET: 1 tab(s) orally once a day (23 May 2022 11:49)  ATORVASTATIN 40 MG TABLET: 1 tab(s) orally once a day (23 May 2022 11:49)  ezetimibe 10 mg oral tablet: 1 tab(s) orally once a day (23 May 2022 11:49)  JANUMET 50/500 MG TABLET MS: TAKE 1 TABLET EVERY DAY (23 May 2022 11:49)  RAMIPRIL 10 MG CAPS: TAKE 1 CAPSULE EVERY DAY (23 May 2022 11:49)      VITALS:   T(F): 97.5 (10-05 @ 00:31), Max: 98.2 (10-04 @ 17:37)  HR: 57 (10-05 @ 00:31) (57 - 76)  BP: 134/74 (10-05 @ 00:31) (119/70 - 154/67)  BP(mean): --  RR: 18 (10-05 @ 00:31) (18 - 18)  SpO2: 96% (10-05 @ 00:31) (96% - 97%)    I&O's Summary      REVIEW OF SYSTEMS:  CONSTITUTIONAL: No weakness, fevers or chills  EYES: No visual changes  ENT: No vertigo or throat pain   NECK: No pain or stiffness  RESPIRATORY: see HPI   CARDIOVASCULAR: See HPI  GASTROINTESTINAL: No abdominal or epigastric pain. No nausea, vomiting, or hematemesis; No diarrhea or constipation. No melena or hematochezia.  GENITOURINARY: No dysuria, frequency or hematuria  NEUROLOGICAL: No numbness or weakness  SKIN: No itching, no rashes  MSK: no    PHYSICAL EXAM:  NEURO: patient is awake, alert and oriented  GEN: Not in acute distress  NECK: no thyroid enlargement, no JVD  LUNGS: Clear to auscultation bilaterally   CARDIOVASCULAR: S1/S2 present, RRR, no murmurs  ABD: Soft,  EXT: No CARL    LABS:                        12.4   20.09 )-----------( 226      ( 04 Oct 2024 19:06 )             37.8     10-04    135  |  97[L]  |  45[H]  ----------------------------<  208[H]  4.3   |  22  |  2.6[H]    Ca    9.0      04 Oct 2024 19:06    TPro  6.8  /  Alb  4.2  /  TBili  0.7  /  DBili  x   /  AST  264[H]  /  ALT  73[H]  /  AlkPhos  105  10-04        Troponin trend:  370 -> 344 -> 280     ECG:    10/4: EKG with TWIs in inferolateral leads     TELEMETRY EVENTS:  10/4 reviewed at midnight: no events

## 2024-10-05 NOTE — H&P ADULT - ATTENDING COMMENTS
Pt seen and examined at bedside with several family members present. Pt states he feels well. Reports that he was about to walk upstairs from his basement. He was on the first step going up. He reports he doesn't recall anything else but work up on the floor. He reports his pants were wet but reports that he thinks his son just mopped the floor. When he tried to get up his left arm was weak. He could lift up his left arm but he could not keep it up. He denies any dizziness, HA, etc prior to event. After a while he reports he forced himself to get up and went about his day and felt well. Reports intermittent burning over past week with urination.    Suspect pt may have had TIA caused by cardiac arrhtymia. Case dw EP who recommended cardiac monitoring on dc as well as ischemic work up. Awaiting for gen cardio attending recommendations. Neuro eval appreciated. Hold CTA for now given ARIELLE. Check echo and MRI. c/w telemetry. hold rampril, allopurinol for ARIELLE. Hold lipitor for transaminitis. elevated transaminases may be related to fall. check ck and ggt. c/w abx and check urine culture.

## 2024-10-05 NOTE — PROGRESS NOTE ADULT - SUBJECTIVE AND OBJECTIVE BOX
SUBJECTIVE:  HPI:  84 years old male patient with pmh of HLD, DM, HTN brought to the ed for fall. Hx taken from the patient and is aox3 at time of the interview.  Patient states that he was going down to his basement, and when he got there the next thing he remembers is lying himself on the floor on his back. He does not know wether he lost consciousness or not not but thinks that he lost conciousnes as he does not know what actually happened and how he found himself on the floor. On further inquiry, patient states that he was trying to get up from the floor then he felt that his left shoulder is weak, painful and stiff. on trying to get up from the ground, he state that he hit his head against the ground .  He was finally able to gain his strength back and get up, he went up to his room and slept. His sons called his PCP who advised him to go to the ER. Further review of the systems is negative for any headache, light headiness dizziness palpitations, chest pain, nausea, vomit, diarrhea, dysuria , frequency, urgency, extremity weakness and other significant complaints.     Patient is a 84y old Male who presents with a chief complaint of syncope  Currently admitted to medicine with the primary diagnosis of syncope    Patient assessed today complaining of right shoulder pain, says he had right arm weakness after he lost consciousness which now resolved        Today is hospital day 1d.     PAST MEDICAL & SURGICAL HISTORY  DM (diabetes mellitus)    HTN (hypertension)    HLD (hyperlipidemia)    H/O knee surgery        ALLERGIES:  No Known Allergies    MEDICATIONS:  ACTIVE MEDICATIONS  cefTRIAXone   IVPB 1000 milliGRAM(s) IV Intermittent every 24 hours  heparin   Injectable 5000 Unit(s) SubCutaneous every 8 hours  lactated ringers. 1000 milliLiter(s) IV Continuous <Continuous>      VITALS:   T(F): 97.8  HR: 62  BP: 135/64  RR: 18  SpO2: 98%    LABS:                        10.4   14.24 )-----------( 182      ( 05 Oct 2024 05:52 )             31.4     10-05    135  |  100  |  44[H]  ----------------------------<  168[H]  3.8   |  23  |  2.2[H]    Ca    8.4      05 Oct 2024 05:52  Mg     2.1     10-05    TPro  5.3[L]  /  Alb  3.5  /  TBili  0.6  /  DBili  x   /  AST  182[H]  /  ALT  62[H]  /  AlkPhos  81  10-05      Urinalysis Basic - ( 05 Oct 2024 05:52 )    Color: x / Appearance: x / SG: x / pH: x  Gluc: 168 mg/dL / Ketone: x  / Bili: x / Urobili: x   Blood: x / Protein: x / Nitrite: x   Leuk Esterase: x / RBC: x / WBC x   Sq Epi: x / Non Sq Epi: x / Bacteria: x            Urinalysis with Rflx Culture (collected 04 Oct 2024 23:05)      CARDIAC MARKERS ( 05 Oct 2024 09:45 )  x     / x     / x     / x     / 17.5 ng/mL          PHYSICAL EXAM:  GEN: No acute distress  LUNGS: Clear to auscultation bilaterally   HEART: S1/S2 present.    ABD: Soft, non-tender, non-distended.   EXT: No pedal edema  NEURO: AAOX3

## 2024-10-05 NOTE — PATIENT PROFILE ADULT - SURGICAL SITE INCISION
[facility] Well Child Check    ASSESSMENT & PLAN  Nelida Kline is a 15  y.o. 4  m.o. who has normal growth and normal development.    Diagnoses and all orders for this visit:    Well child check  -     Hearing Screening  -     Vision Screening    Allergy to dog dander    Allergy to cats    sports physical form completed. Monitor chest tightness with exercise and if recurs, return for evaluation for possible RAD or exercise induced asthma with pt having dog and cat allergies.     Return to clinic in 1 year for a Well Child Check or sooner as needed   Sports physical form completed.    IMMUNIZATIONS/LABS  Immunizations were reviewed and orders were placed as appropriate., Patient will return to clinic for fasting lipid and glucose leve and I have discussed the risks and benefits of all of the vaccine components with the patient/parents.  All questions have been answered.    REFERRALS  Dental:  Recommend routine dental care as appropriate. she sees dentist regularly   Other:  No additional referrals were made at this time.    ANTICIPATORY GUIDANCE  I have reviewed age appropriate anticipatory guidance.  Social:  Peer Pressure  Nutrition:  Junk Food and Dieting  Health:  Drugs, Smoking, Alcohol, Self Breast Exam, Activity (>45 min/day), Sleep, Sun Screen and Dental Care  Safety:  Seat Belts  Sexuality:  Body Changes, Safe Sex, STD's and Contraception    HEALTH HISTORY  Do you have any concerns that you'd like to discuss today?: No concerns       Roomed by: quin    Accompanied by Father    Refills needed? No    Do you have any forms that need to be filled out? No        Do you have any significant health concerns in your family history?:   Family History   Problem Relation Age of Onset     No Medical Problems Mother      No Medical Problems Father      No Medical Problems Brother      Osteoarthritis Maternal Grandmother      Diabetes Maternal Grandfather      ty 2     CABG Maternal Grandfather 60     No Medical  Problems Paternal Grandmother      No Medical Problems Paternal Grandfather      No Medical Problems Brother      Since your last visit, have there been any major changes in your family, such as a move, job change, separation, divorce, or death in the family?: No    Home  Who lives in your home?:  Mom, dad, 2 brothers  Social History     Social History Narrative     No narrative on file     Do you have any trouble with sleep?:  No    Education  What school does your child attend?:  [employer]  What grade is your child in?:  9th  How does the patient perform in school (grades, behavior, attention, homework?: good     Eating  Does patient eat regular meals including fruits and vegetables?:  yes  What is the patient drinking (cow's milk, water, soda, juice, sports drinks, energy drinks, etc)?: water and juice  Does patient have concerns about body or appearance?:  No    Activities  Does the patient have friends?:  yes  Does the patient get at least one hour of physical activity per day?:  yes  Does the patient have less than 2 hours of screen time per day (aside from homework)?:  yes, sometimes  What does your child do for exercise?:  gymnastics  Does the patient have interest/participate in community activities/volunteers/school sports?:  yes    MENTAL HEALTH SCREENING  PHQ-2 Total Score: 0 (8/14/2017  8:00 AM)  No Data Recorded    VISION/HEARING  Vision: Completed. See Results  Hearing:  Completed. See Results     Hearing Screening    125Hz 250Hz 500Hz 1000Hz 2000Hz 3000Hz 4000Hz 6000Hz 8000Hz   Right ear:   20 20 20  20     Left ear:   20 20 20  20        Visual Acuity Screening    Right eye Left eye Both eyes   Without correction: 20/20 20/20    With correction:          TB Risk Assessment:  The patient and/or parent/guardian answer positive to:  patient and/or parent/guardian answer 'no' to all screening TB questions    Dental  Is your child being seen by a dentist?  Yes  [employer] Application  "Screening NA    Patient Active Problem List   Diagnosis     Patellofemoral Syndrome Of The Left Knee       Drugs  Does the patient use tobacco/alcohol/drugs?:  no    Safety  Does the patient have any safety concerns (peer or home)?:  no  Does the patient use safety belts, helmets and other safety equipment?:  yes    Sex  Is the patient sexually active?:  no    MEASUREMENTS  Height:  5' 3.75\" (1.619 m)  Weight: 119 lb 12.8 oz (54.3 kg)  BMI: Body mass index is 20.73 kg/(m^2).  Blood Pressure:    Blood pressure percentiles are 35 % systolic and 46 % diastolic based on NHBPEP's 4th Report. Blood pressure percentile targets: 90: 123/79, 95: 127/83, 99 + 5 mmH/96.    PHYSICAL EXAM  Gen: alert and oriented X3; no acute distress  HEENT: PERLLA; EOMI; nose clear bilaterally without rhinorrhea. Mouth: clear without lesions. MMM  Neck: supple without LAD.   Lungs: clear bilaterally without wheezing or rhonchi.   CV: RRR without murmur. Nl CRT and normal pulses.   Abd: NL BS, NT/ND; no HSM or masses.    : normal female exam  Skin: no skin   Musck: no deformities or injuries. ROM normal in all joints. Back: no scoliosis  Neuro: CN 2-12 normal. Normal DTRs and strengths     Job Guerrero MD     " no

## 2024-10-05 NOTE — H&P ADULT - NSHPPHYSICALEXAM_GEN_ALL_CORE
LOS: 1d    VITALS:   T(C): 36.4 (10-05-24 @ 00:31), Max: 36.8 (10-04-24 @ 17:37)  HR: 57 (10-05-24 @ 00:31) (57 - 76)  BP: 134/74 (10-05-24 @ 00:31) (119/70 - 154/67)  RR: 18 (10-05-24 @ 00:31) (18 - 18)  SpO2: 96% (10-05-24 @ 00:31) (96% - 97%)    GENERAL: NAD, lying in bed comfortably  HEAD:  Atraumatic, Normocephalic  EYES: EOMI, PERRLA, conjunctiva and sclera clear  ENT: Moist mucous membranes  NECK: Supple, No JVD  CHEST/LUNG: Clear to auscultation bilaterally; No rales, rhonchi, wheezing, or rubs. Unlabored respirations  HEART: Regular rate and rhythm; No murmurs, rubs, or gallops  ABDOMEN: BSx4; Soft, nontender, nondistended  EXTREMITIES:  2+ Peripheral Pulses, brisk capillary refill. No clubbing, cyanosis, or edema  NERVOUS SYSTEM:  A&Ox3, no focal deficits   SKIN: No rashes or lesions

## 2024-10-05 NOTE — CONSULT NOTE ADULT - SUBJECTIVE AND OBJECTIVE BOX
Patient is a 84y old  Male who presents with a chief complaint of       HPI:  84 years old male patient with pmh of HLD, DM, HTN brought to the ed for fall. Hx taken from the patient and is aox3 at time of the interview.  Patient states that he was going down to his basement, and when he got there the next thing he remembers is lying himself on the floor on his back. He does not know wether he lost consciousness or not not but thinks that he lost conciousnes as he does not know what actually happened and how he found himself on the floor. On further inquiry, patient states that he was trying to get up from the floor then he felt that his left shoulder is weak, painful and stiff. on trying to get up from the ground, he state that he hit his head against the ground .  He was finally able to gain his strength back and get up, he went up to his room and slept. His sons called his PCP who advised him to go to the ER. Further review of the systems is negative for any headache, light headiness dizziness palpitations, chest pain, nausea, vomit, diarrhea, dysuria , frequency, urgency, extremity weakness and other significant complaints.                (05 Oct 2024 02:37)      Electrophysiology:  84y Male with h/o HTN, HLD, DM, physically active, independent, was admitted with syncopal episode. Per pt, started walking stairs down to basement, then woke up in the basement, unable to get up right away, left arm pain and weakness. Denies preceding symptoms of dizziness, lightheadedness, palpitations, no prior syncopal events Denies chest discomfort, dyspnea. Never had any cardiac work up. In ED trauma work up including CT head, neck, chest, abd, pelvic were negative for any traumatic injuries.   EP consulted for syncopal events  Noted to have positive CE, EKG changes. Noted to be in ARIELLE Cr 2.6, last Cr 1.2 @2022    REVIEW OF SYSTEMS    [x ] A ten-point review of systems was otherwise negative except as noted.  [ ] Due to altered mental status/intubation, subjective information were not able to be obtained from the patient. History was obtained, to the extent possible, from review of the chart and collateral sources of information.      PAST MEDICAL & SURGICAL HISTORY:  DM (diabetes mellitus)      HTN (hypertension)      HLD (hyperlipidemia)      H/O knee surgery          Home Medications:  ALLOPURINOL 100 MG TABLET: 1 tab(s) orally once a day (05 Oct 2024 04:51)  ATORVASTATIN 40 MG TABLET: 1 tab(s) orally once a day (05 Oct 2024 04:51)  ezetimibe 10 mg oral tablet: 1 tab(s) orally once a day (05 Oct 2024 04:51)  JANUMET 50/500 MG TABLET MS: TAKE 1 TABLET EVERY DAY (05 Oct 2024 04:51)  RAMIPRIL 10 MG CAPS: TAKE 1 CAPSULE EVERY DAY (05 Oct 2024 04:51)      Allergies:  No Known Allergies      FAMILY HISTORY: not contributory       SOCIAL HISTORY: denies tobacco / ETOH / illicit drug use     CIGARETTES: former smoker, d/c 1970  ALCOHOL: denies   MARIJUANA: denies   ILLICIT DRUGS: denies         PREVIOUS DIAGNOSTIC TESTING:      ECHO  FINDINGS:    NON-INVASIVE   FINDINGS:    CATHETERIZATION  FINDINGS:    ELECTROPHYSIOLOGY STUDY  FINDINGS:    CAROTIDS :  FINDINGS  < from: VA Duplex Carotid, Bilat (10.05.24 @ 08:46) >    IMPRESSION: No significant hemodynamic stenosis of either carotid artery.   Less than 50% stenosis bilaterally with minimal plaque burden.    < end of copied text >      VENOUS DUPLEX SCAN:  FINDINGS:    CHEST CT PULMONARY ANGIO with IV Contrast:  FINDINGS:  < from: CT Angio Chest PE Protocol w/ IV Cont (10.04.24 @ 21:11) >  IMPRESSION:    1. No evidence of acute thoracic or abdominal pathology. No pulmonary   embolus.    2. Findings compatible with urinary bladder infection/cystitis;   correlation with urinalysis suggested.    < end of copied text >        MEDICATIONS  (STANDING):  cefTRIAXone   IVPB 1000 milliGRAM(s) IV Intermittent every 24 hours  heparin   Injectable 5000 Unit(s) SubCutaneous every 8 hours  lactated ringers. 1000 milliLiter(s) (75 mL/Hr) IV Continuous <Continuous>    MEDICATIONS  (PRN):      Vital Signs Last 24 Hrs  T(C): 36.6 (05 Oct 2024 06:10), Max: 36.8 (04 Oct 2024 17:37)  T(F): 97.9 (05 Oct 2024 06:10), Max: 98.2 (04 Oct 2024 17:37)  HR: 62 (05 Oct 2024 06:10) (57 - 76)  BP: 135/64 (05 Oct 2024 06:10) (119/70 - 154/67)  BP(mean): --  RR: 18 (05 Oct 2024 06:10) (18 - 18)  SpO2: 98% (05 Oct 2024 06:10) (96% - 98%)    Parameters below as of 05 Oct 2024 06:10  Patient On (Oxygen Delivery Method): room air        PHYSICAL EXAM:    GENERAL: In no apparent distress, well nourished, and hydrated.  HEAD:  Atraumatic, Normocephalic  EYES: EOMI, PERRLA, conjunctiva and sclera clear  NECK: Supple and normal thyroid.  No JVD or carotid bruit.  Carotid pulse is 2+ bilaterally.  HEART: Regular rate and rhythm; No murmurs, rubs, or gallops.  PULMONARY: Clear to auscultation and perfusion.  No rales, wheezing, or rhonchi bilaterally.  ABDOMEN: Soft, Nontender, Nondistended; Bowel sounds present  EXTREMITIES:  2+ Peripheral Pulses, No clubbing, cyanosis, or edema  NEUROLOGICAL: Grossly nonfocal    I&O's Detail    Daily     Daily     INTERPRETATION OF TELEMETRY:    ECG:  NSR @60 with inferolateral TWI      LABS:                        10.4   14.24 )-----------( 182      ( 05 Oct 2024 05:52 )             31.4     10-05    135  |  100  |  44[H]  ----------------------------<  168[H]  3.8   |  23  |  2.2[H]    Ca    8.4      05 Oct 2024 05:52  Mg     2.1     10-05    TPro  5.3[L]  /  Alb  3.5  /  TBili  0.6  /  DBili  x   /  AST  182[H]  /  ALT  62[H]  /  AlkPhos  81  10-05      CARDIAC MARKERS ( 05 Oct 2024 09:45 )  x     / x     / x     / x     / 17.5 ng/mL  -->Troponin T, High Sensitivity Result: 274: Critical value:   ->Troponin T, High Sensitivity Result: 280:  ->Troponin T, High Sensitivity Result: 344:   Troponin T, High Sensitivity Result: 370:     BNPPro-Brain Natriuretic Peptide: 1655 pg/mL (10-04 @ 19:06)        Urinalysis with Rflx Culture (collected 10-04-24 @ 23:05)            RADIOLOGY & ADDITIONAL STUDIES:    < from: CT Head No Cont (10.04.24 @ 19:09) >  MPRESSION:  IMPRESSION CT HEAD:  No evidence of acute intracranial abnormality.  IMPRESSION CT MAXILLOFACIAL:  No evidence of acute maxillofacial fracture.  IMPRESSION CT CERVICAL SPINE:  No evidence of acute cervical spine fracture    < end of copied text >      < from: CT Abdomen and Pelvis w/ IV Cont (10.04.24 @ 21:11) >    FINDINGS:    CHEST:      PULMONARY ARTERIES: No evidence of acute pulmonary embolism.    LUNGS, PLEURA, AIRWAYS: No lobar consolidation, mass, effusion, or   pneumothorax. No evidence of central endobronchial obstruction. No   bronchiectasis or honeycombing. No suspicious pulmonary nodule. Mild   centrilobular and paraseptal emphysema. Bilateral subsegmental   atelectasis.    THORACIC NODES: No mediastinal, hilar, supraclavicular, or axillary   lymphadenopathy.    MEDIASTINUM/GREAT VESSELS: No pericardial effusion. Mild cardiomegaly.   The aorta and main pulmonary artery are of normal caliber.    ABDOMEN/PELVIS:    HEPATOBILIARY: Postcholecystectomy with likely physiologic postoperative   mild biliary distention. Subcentimeter left hepatic lobe hypodensity,   likely cyst. Patent portal vein. No evidence of liver injury.    SPLEEN: Unremarkable.    PANCREAS: Unremarkable.    ADRENAL GLANDS: Unremarkable.    KIDNEYS: Symmetric pattern of renal enhancement. No hydronephrosis   bilaterally.    ABDOMINOPELVIC NODES: No lymphadenopathy.    PELVIC ORGANS: BPH. Diffuse urinary bladder wall thickening, mural   hyperenhancement, pericystic stranding.    PERITONEUM/MESENTERY/BOWEL: No bowel obstruction. No ascites or   pneumoperitoneum. Colonic diverticulosis. Normal appendix.`    BONES/SOFT TISSUES: Bilateral gynecomastia.    OTHER: Vascular calcifications.. Bilateral small fat-containing inguinal   hernias.      IMPRESSION:    1. No evidence of acute thoracic or abdominal pathology. No pulmonary   embolus.    2. Findings compatible with urinary bladder infection/cystitis;   correlation with urinalysis suggested.    < end of copied text >

## 2024-10-05 NOTE — CONSULT NOTE ADULT - SUBJECTIVE AND OBJECTIVE BOX
HPI: 84y Male with PMHx of hld, htn, and DM, who came to ED for fall. Pt states 2 days ago he was walking to his basement, and when he got to the last step he ended up on his back on the floor. Pt does not recall how he got there or if he had LOC. Pt states following the fall he was trying to get up and noticed his LUE was weak, he stated he was trying to lift it and it would fall back down, pt denies associated LUE pain at the time. Pt endorsed this LUE weakness for approximately a few hours, and noted when he woke up the next morning he had no weakness. Pt endorses "soreness" of the left shoulder, denies LLE weakness, numbness, confusion. No current neurologic complaints at this time.     T(C): 36.6 (10-05-24 @ 06:10), Max: 36.8 (10-04-24 @ 17:37)  HR: 62 (10-05-24 @ 06:10) (57 - 76)  BP: 135/64 (10-05-24 @ 06:10) (119/70 - 154/67)  RR: 18 (10-05-24 @ 06:10) (18 - 18)  SpO2: 98% (10-05-24 @ 06:10) (96% - 98%)    PAST MEDICAL & SURGICAL HISTORY:  DM (diabetes mellitus)      HTN (hypertension)      HLD (hyperlipidemia)      H/O knee surgery          FAMILY HISTORY:      SOCIAL HISTORY:  Denies smoking, drinking, or drug use    ROS:see hpi    Musculoskeletal: No joint pain or swelling; No muscle, back or extremity pain  Psychiatric: No depression, anxiety, mood swings or difficulty sleeping  Heme/Lymph: No easy bruising or bleeding gums    MEDICATIONS  (STANDING):  cefTRIAXone   IVPB 1000 milliGRAM(s) IV Intermittent every 24 hours  heparin   Injectable 5000 Unit(s) SubCutaneous every 8 hours  lactated ringers. 1000 milliLiter(s) (75 mL/Hr) IV Continuous <Continuous>    MEDICATIONS  (PRN):    Home Medications:  ALLOPURINOL 100 MG TABLET: 1 tab(s) orally once a day (05 Oct 2024 04:51)  ATORVASTATIN 40 MG TABLET: 1 tab(s) orally once a day (05 Oct 2024 04:51)  ezetimibe 10 mg oral tablet: 1 tab(s) orally once a day (05 Oct 2024 04:51)  JANUMET 50/500 MG TABLET MS: TAKE 1 TABLET EVERY DAY (05 Oct 2024 04:51)  RAMIPRIL 10 MG CAPS: TAKE 1 CAPSULE EVERY DAY (05 Oct 2024 04:51)      Allergies    No Known Allergies    Intolerances      Vital Signs Last 24 Hrs  T(C): 36.6 (05 Oct 2024 06:10), Max: 36.8 (04 Oct 2024 17:37)  T(F): 97.9 (05 Oct 2024 06:10), Max: 98.2 (04 Oct 2024 17:37)  HR: 62 (05 Oct 2024 06:10) (57 - 76)  BP: 135/64 (05 Oct 2024 06:10) (119/70 - 154/67)  BP(mean): --  RR: 18 (05 Oct 2024 06:10) (18 - 18)  SpO2: 98% (05 Oct 2024 06:10) (96% - 98%)    Parameters below as of 05 Oct 2024 06:10  Patient On (Oxygen Delivery Method): room air        Physical exam:  General: No acute distress, awake and alert      Neurologic:  -Mental status: Awake, alert, oriented to person, place, and time. Speech is fluent with intact naming, repetition, and comprehension, no dysarthria. Recent and remote memory intact. Follows commands. Attention/concentration intact. Fund of knowledge appropriate.  -Cranial nerves:   II: Visual fields are full to confrontation.  III, IV, VI: Extraocular movements are intact without nystagmus. Pupils equally round and reactive to light  V:  Facial sensation V1-V3 equal and intact   VII: Face is symmetric with normal eye closure and smile  VIII: Hearing is bilaterally intact to finger rub  IX, X: Uvula is midline and soft palate rises symmetrically  XI: Head turning and shoulder shrug are intact.  XII: Tongue protrudes midline  Motor: Normal bulk and tone. No pronator drift. Strength bilateral upper extremity 5/5, bilateral lower extremities 5/5.  Rapid alternating movements intact and symmetric  Sensation: Intact to light touch bilaterally. No neglect or extinction on double simultaneous testing.  Coordination: No dysmetria on finger-to-nose and heel-to-shin bilaterally  Reflexes: Downgoing toes bilaterally   Gait: Narrow gait and steady    NIHSS: 0     Fingerstick Blood Glucose: CAPILLARY BLOOD GLUCOSE      POCT Blood Glucose.: 190 mg/dL (05 Oct 2024 07:38)    LABS:                        10.4   14.24 )-----------( 182      ( 05 Oct 2024 05:52 )             31.4     10-05    135  |  100  |  44[H]  ----------------------------<  168[H]  3.8   |  23  |  2.2[H]    Ca    8.4      05 Oct 2024 05:52  Mg     2.1     10-05    TPro  5.3[L]  /  Alb  3.5  /  TBili  0.6  /  DBili  x   /  AST  182[H]  /  ALT  62[H]  /  AlkPhos  81  10-05      CARDIAC MARKERS ( 05 Oct 2024 09:45 )  x     / x     / x     / x     / 17.5 ng/mL      Urinalysis Basic - ( 05 Oct 2024 05:52 )    Color: x / Appearance: x / SG: x / pH: x  Gluc: 168 mg/dL / Ketone: x  / Bili: x / Urobili: x   Blood: x / Protein: x / Nitrite: x   Leuk Esterase: x / RBC: x / WBC x   Sq Epi: x / Non Sq Epi: x / Bacteria: x        RADIOLOGY & ADDITIONAL STUDIES:    HCT:      IMPRESSION:  IMPRESSION CT HEAD:  No evidence of acute intracranial abnormality.  IMPRESSION CT MAXILLOFACIAL:  No evidence of acute maxillofacial fracture.  IMPRESSION CT CERVICAL SPINE:  No evidence of acute cervical spine fracture

## 2024-10-06 LAB
ALBUMIN SERPL ELPH-MCNC: 3.4 G/DL — LOW (ref 3.5–5.2)
ALP SERPL-CCNC: 83 U/L — SIGNIFICANT CHANGE UP (ref 30–115)
ALT FLD-CCNC: 66 U/L — HIGH (ref 0–41)
ANION GAP SERPL CALC-SCNC: 11 MMOL/L — SIGNIFICANT CHANGE UP (ref 7–14)
AST SERPL-CCNC: 134 U/L — HIGH (ref 0–41)
BASOPHILS # BLD AUTO: 0.02 K/UL — SIGNIFICANT CHANGE UP (ref 0–0.2)
BASOPHILS NFR BLD AUTO: 0.2 % — SIGNIFICANT CHANGE UP (ref 0–1)
BILIRUB SERPL-MCNC: 0.6 MG/DL — SIGNIFICANT CHANGE UP (ref 0.2–1.2)
BUN SERPL-MCNC: 35 MG/DL — HIGH (ref 10–20)
CALCIUM SERPL-MCNC: 8.5 MG/DL — SIGNIFICANT CHANGE UP (ref 8.4–10.5)
CHLORIDE SERPL-SCNC: 105 MMOL/L — SIGNIFICANT CHANGE UP (ref 98–110)
CHOLEST SERPL-MCNC: 119 MG/DL — SIGNIFICANT CHANGE UP
CK SERPL-CCNC: 4355 U/L — HIGH (ref 0–225)
CO2 SERPL-SCNC: 24 MMOL/L — SIGNIFICANT CHANGE UP (ref 17–32)
CREAT SERPL-MCNC: 1.4 MG/DL — SIGNIFICANT CHANGE UP (ref 0.7–1.5)
EGFR: 50 ML/MIN/1.73M2 — LOW
EOSINOPHIL # BLD AUTO: 0.07 K/UL — SIGNIFICANT CHANGE UP (ref 0–0.7)
EOSINOPHIL NFR BLD AUTO: 0.8 % — SIGNIFICANT CHANGE UP (ref 0–8)
GLUCOSE BLDC GLUCOMTR-MCNC: 128 MG/DL — HIGH (ref 70–99)
GLUCOSE BLDC GLUCOMTR-MCNC: 170 MG/DL — HIGH (ref 70–99)
GLUCOSE BLDC GLUCOMTR-MCNC: 182 MG/DL — HIGH (ref 70–99)
GLUCOSE BLDC GLUCOMTR-MCNC: 209 MG/DL — HIGH (ref 70–99)
GLUCOSE SERPL-MCNC: 170 MG/DL — HIGH (ref 70–99)
HAV IGM SER-ACNC: SIGNIFICANT CHANGE UP
HBV CORE IGM SER-ACNC: SIGNIFICANT CHANGE UP
HBV SURFACE AG SER-ACNC: SIGNIFICANT CHANGE UP
HCT VFR BLD CALC: 31 % — LOW (ref 42–52)
HCV AB S/CO SERPL IA: 0.07 S/CO — SIGNIFICANT CHANGE UP (ref 0–0.99)
HCV AB SERPL-IMP: SIGNIFICANT CHANGE UP
HDLC SERPL-MCNC: 33 MG/DL — LOW
HGB BLD-MCNC: 10.3 G/DL — LOW (ref 14–18)
IMM GRANULOCYTES NFR BLD AUTO: 0.6 % — HIGH (ref 0.1–0.3)
LIPID PNL WITH DIRECT LDL SERPL: 54 MG/DL — SIGNIFICANT CHANGE UP
LYMPHOCYTES # BLD AUTO: 0.93 K/UL — LOW (ref 1.2–3.4)
LYMPHOCYTES # BLD AUTO: 10.2 % — LOW (ref 20.5–51.1)
MAGNESIUM SERPL-MCNC: 2.1 MG/DL — SIGNIFICANT CHANGE UP (ref 1.8–2.4)
MCHC RBC-ENTMCNC: 31.9 PG — HIGH (ref 27–31)
MCHC RBC-ENTMCNC: 33.2 G/DL — SIGNIFICANT CHANGE UP (ref 32–37)
MCV RBC AUTO: 96 FL — HIGH (ref 80–94)
MONOCYTES # BLD AUTO: 0.95 K/UL — HIGH (ref 0.1–0.6)
MONOCYTES NFR BLD AUTO: 10.5 % — HIGH (ref 1.7–9.3)
NEUTROPHILS # BLD AUTO: 7.07 K/UL — HIGH (ref 1.4–6.5)
NEUTROPHILS NFR BLD AUTO: 77.7 % — HIGH (ref 42.2–75.2)
NON HDL CHOLESTEROL: 86 MG/DL — SIGNIFICANT CHANGE UP
NRBC # BLD: 0 /100 WBCS — SIGNIFICANT CHANGE UP (ref 0–0)
PHOSPHATE SERPL-MCNC: 3.9 MG/DL — SIGNIFICANT CHANGE UP (ref 2.1–4.9)
PLATELET # BLD AUTO: 202 K/UL — SIGNIFICANT CHANGE UP (ref 130–400)
PMV BLD: 10.7 FL — HIGH (ref 7.4–10.4)
POTASSIUM SERPL-MCNC: 4.2 MMOL/L — SIGNIFICANT CHANGE UP (ref 3.5–5)
POTASSIUM SERPL-SCNC: 4.2 MMOL/L — SIGNIFICANT CHANGE UP (ref 3.5–5)
PROT SERPL-MCNC: 5.3 G/DL — LOW (ref 6–8)
RBC # BLD: 3.23 M/UL — LOW (ref 4.7–6.1)
RBC # FLD: 12.1 % — SIGNIFICANT CHANGE UP (ref 11.5–14.5)
SODIUM SERPL-SCNC: 140 MMOL/L — SIGNIFICANT CHANGE UP (ref 135–146)
TRIGL SERPL-MCNC: 163 MG/DL — HIGH
WBC # BLD: 9.09 K/UL — SIGNIFICANT CHANGE UP (ref 4.8–10.8)
WBC # FLD AUTO: 9.09 K/UL — SIGNIFICANT CHANGE UP (ref 4.8–10.8)

## 2024-10-06 PROCEDURE — 99233 SBSQ HOSP IP/OBS HIGH 50: CPT

## 2024-10-06 PROCEDURE — 99221 1ST HOSP IP/OBS SF/LOW 40: CPT

## 2024-10-06 RX ORDER — SODIUM CHLORIDE IRRIG SOLUTION 0.9 %
1000 SOLUTION, IRRIGATION IRRIGATION
Refills: 0 | Status: DISCONTINUED | OUTPATIENT
Start: 2024-10-06 | End: 2024-10-07

## 2024-10-06 RX ADMIN — Medication 5000 UNIT(S): at 14:16

## 2024-10-06 RX ADMIN — Medication 3 UNIT(S): at 08:21

## 2024-10-06 RX ADMIN — Medication 3 UNIT(S): at 11:58

## 2024-10-06 RX ADMIN — Medication 100 MILLIGRAM(S): at 05:22

## 2024-10-06 RX ADMIN — Medication 3 UNIT(S): at 16:49

## 2024-10-06 RX ADMIN — Medication 5000 UNIT(S): at 05:23

## 2024-10-06 RX ADMIN — Medication 1: at 08:21

## 2024-10-06 RX ADMIN — Medication 1: at 16:48

## 2024-10-06 RX ADMIN — INSULIN GLARGINE 9 UNIT(S): 300 INJECTION, SOLUTION SUBCUTANEOUS at 21:34

## 2024-10-06 RX ADMIN — Medication 81 MILLIGRAM(S): at 11:59

## 2024-10-06 RX ADMIN — Medication 5000 UNIT(S): at 21:34

## 2024-10-06 NOTE — PROGRESS NOTE ADULT - SUBJECTIVE AND OBJECTIVE BOX
SUBJECTIVE/OVERNIGHT EVENTS  Today is hospital day 2d. This morning patient was seen and examined at bedside, resting comfortably in bed. No acute or major events overnight.    HOSPITAL COURSE  Day 1:   Day 2:   Day 3:     CODE STATUS:    FAMILY COMMUNICATION  Contact date:  Name of person contacted:  Relationship to patient:  Communication details:    MEDICATIONS  STANDING MEDICATIONS  aspirin  chewable 81 milliGRAM(s) Oral daily  cefTRIAXone   IVPB 1000 milliGRAM(s) IV Intermittent every 24 hours  dextrose 5%. 1000 milliLiter(s) IV Continuous <Continuous>  dextrose 5%. 1000 milliLiter(s) IV Continuous <Continuous>  dextrose 50% Injectable 25 Gram(s) IV Push once  dextrose 50% Injectable 25 Gram(s) IV Push once  dextrose 50% Injectable 12.5 Gram(s) IV Push once  glucagon  Injectable 1 milliGRAM(s) IntraMuscular once  heparin   Injectable 5000 Unit(s) SubCutaneous every 8 hours  insulin glargine Injectable (LANTUS) 9 Unit(s) SubCutaneous at bedtime  insulin lispro (ADMELOG) corrective regimen sliding scale   SubCutaneous three times a day before meals  insulin lispro Injectable (ADMELOG) 3 Unit(s) SubCutaneous three times a day before meals  lactated ringers. 1000 milliLiter(s) IV Continuous <Continuous>    PRN MEDICATIONS  dextrose Oral Gel 15 Gram(s) Oral once PRN    VITALS  T(F): 98.4 (10-05-24 @ 19:39), Max: 98.4 (10-05-24 @ 19:39)  HR: 72 (10-05-24 @ 19:39) (62 - 72)  BP: 137/66 (10-05-24 @ 19:39) (135/64 - 137/66)  RR: 18 (10-05-24 @ 19:39) (18 - 18)  SpO2: 97% (10-05-24 @ 11:27) (97% - 98%)  POCT Blood Glucose.: 200 mg/dL (10-05-24 @ 21:15)  POCT Blood Glucose.: 170 mg/dL (10-05-24 @ 16:53)  POCT Blood Glucose.: 235 mg/dL (10-05-24 @ 12:03)  POCT Blood Glucose.: 190 mg/dL (10-05-24 @ 07:38)    PHYSICAL EXAM  GEN: No acute distress  LUNGS: Clear to auscultation bilaterally   HEART: S1/S2 present.    ABD: Soft, non-tender, non-distended.   EXT: No pedal edema  NEURO: AAOX3    (  ) Indwelling Jernigan Catheter   Date insterted:    Reason (  ) Critical illness     (  ) urinary retention    (  ) Accurate Ins/Outs Monitoring     (  ) CMO patient    (  ) Central Line  Date inserted:  Location: (  ) Right IJ   (  ) Left IJ   (  ) Right Fem   (  ) Left Fem    (  ) SPC  (  ) pigtail  (  ) PEG tube  (  ) colostomy  (  ) jejunostomy  (  ) U-Dall    LABS             11.0   12.03 )-----------( 178      ( 10-05-24 @ 17:46 )             33.0     135  |  100  |  44  -------------------------<  168   10-05-24 @ 05:52  3.8  |  23  |  2.2    Ca      8.4     10-05-24 @ 05:52  Mg     2.1     10-05-24 @ 05:52    TPro  x   /  Alb  x   /  TBili  x   /  DBili  x   /  AST  x   /  ALT  x   /  AlkPhos  x   /  GGT  23    10-05-24 @ 09:45      CKMB Units: 17.5 ng/mL (10-05-24 @ 09:45)  Troponin T, High Sensitivity Result: 274 ng/L (10-05-24 @ 05:52)  Troponin T, High Sensitivity Result: 280 ng/L (10-05-24 @ 00:50)    Urinalysis Basic - ( 05 Oct 2024 05:52 )    Color: x / Appearance: x / SG: x / pH: x  Gluc: 168 mg/dL / Ketone: x  / Bili: x / Urobili: x   Blood: x / Protein: x / Nitrite: x   Leuk Esterase: x / RBC: x / WBC x   Sq Epi: x / Non Sq Epi: x / Bacteria: x          Urinalysis with Rflx Culture (collected 04 Oct 2024 23:05)      IMAGING

## 2024-10-06 NOTE — EEG REPORT - NS EEG TEXT BOX
[facility] Department of Neurology         Inpatient Routine-Electroencephalography Report    Patient Name:	ALEX GROVER  :	1940  MRN:	-    Study Start Date/Time:	10/5/2024, 9:29:22 PM    End Time (if applicable):        Brief Clinical History:  ALEX GROVER is a 84 year old Male; study performed to investigate for seizures or markers of epilepsy.   Technologist notes: PT FALL LT SIDE PAIN    Diagnosis Code:  R56.9 convulsions/seizure  CPT:   37021 (awake/drowsy)     Pertinent Medication:  n/a    Acquisition Details:  Electroencephalography was acquired using a minimum of 21 channels on an [employer] system v 9.3.1 with electrode placement according to the standard International 10-20 system following ACNS (American Clinical Neurophysiology Society) guidelines.  Anterior temporal T1 and T2 electrodes were utilized whenever possible.  The XLTEK automated spike & seizure detections were all reviewed in detail, in addition to the entire raw EEG.    Findings:  Background:  continuous, with predominantly alpha and beta frequencies.  Generalized Slowing:  None  Symmetry/Focality: No persistent asymmetries of voltage or frequency.     Voltage:  Normal (20+ uV)  Organization:  Appropriate anterior-posterior gradient  Posterior Dominant Rhythm:  9 Hz symmetric, well-organized, and well-modulated  Sleep:  Symmetric, synchronous spindles and K complexes.  Variability:   Yes		Reactivity:  Yes    Spontaneous Activity:  No epileptiform discharges   Events:  1)	No electrographic seizures or significant clinical events occurred during this study.  Provocations:  •	Hyperventilation: was not performed.  •	Photic stimulation: was not performed.  FINAL Impression:  Normalawake and/or drowsy routine EEG  1)	 Unremarkable awake and drowsy  recording.    Final Clinical Correlation:  1)	There were no findings of active epilepsy, however this alone does not rule out the diagnosis.    Jeannette Vásquez MD   Attending Neurologist, [facility]

## 2024-10-06 NOTE — PROGRESS NOTE ADULT - ASSESSMENT
85 yo M PMHx HLD, DM II, HTN brought to the ed for fall and suspected syncope. Reports that he was about to walk upstairs from his basement. He was on the first step going up. He reports he doesn't recall anything else but work up on the floor. He reports his pants were wet but reports that he thinks his son just mopped the floor. When he tried to get up his left arm was weak. He could lift up his left arm but he could not keep it up. He denies any dizziness, HA, etc prior to event. After a while he reports he forced himself to get up and went about his day and felt well. Reports intermittent burning over past week with urination.    Syncope   - unclear etiology  - sudden LOC suggests cardiogenic cause  - EKG: EKG with TWIs in inferolateral leads   - Trops--  370--> 344--> 280   - Cardio: type II NSTEMI given sepsis, get TTE and EP consult  - EP: check serial orthostatic VS, Check TSH / Free T4 , may benefit from ILR if above work up is negative  - possible cardiac cath once ARIELLE resolve        Left shoulder pain and stiffness  Transient LEFT arm weakness    - MRI brain negative  - case d/w neuro attending-unlikely TIA, more likely nerve injury from fall  - no need for CTA    ARIELLE  - likely from rhabdo  - CK eleavted 6000  - SCr improving  - pending repeat CK  - c/w IVF    Pyuria  - has had intermittent dysuria  - c/w rocephin  - follow up cultures       Transaminitis  - ggt normal  - RUQ sono normal  - suggests MSK cause of elevatation especially since CK elevated  - can resume Lipitior      HTN  - hold home ramipril due to arielle    DM  - hold home oral meds  - target blood sugar 140 180  - start insulin if needed     HLD  - resume Lipitor    Gout?  - hold allopurinol due to arielle     DVT px    #Progress Note Handoff:  Pending (specify): resolution of rhabdo, possible ischemic work up  Family discussion: As above with patient and son  Disposition: Home__x_/SNF___/Other________/Unknown at this time________

## 2024-10-06 NOTE — PROGRESS NOTE ADULT - SUBJECTIVE AND OBJECTIVE BOX
CHIEF COMPLAINT:    Patient is a 84y old  Male who presents with a chief complaint of     INTERVAL HPI/OVERNIGHT EVENTS:    Patient seen and examined at bedside. No acute overnight events occurred.    ROS: Denies SOB, chest pain. All other systems are negative.    Medications:  Standing  aspirin  chewable 81 milliGRAM(s) Oral daily  cefTRIAXone   IVPB 1000 milliGRAM(s) IV Intermittent every 24 hours  dextrose 5%. 1000 milliLiter(s) IV Continuous <Continuous>  dextrose 5%. 1000 milliLiter(s) IV Continuous <Continuous>  dextrose 50% Injectable 25 Gram(s) IV Push once  dextrose 50% Injectable 25 Gram(s) IV Push once  dextrose 50% Injectable 12.5 Gram(s) IV Push once  glucagon  Injectable 1 milliGRAM(s) IntraMuscular once  heparin   Injectable 5000 Unit(s) SubCutaneous every 8 hours  insulin glargine Injectable (LANTUS) 9 Unit(s) SubCutaneous at bedtime  insulin lispro (ADMELOG) corrective regimen sliding scale   SubCutaneous three times a day before meals  insulin lispro Injectable (ADMELOG) 3 Unit(s) SubCutaneous three times a day before meals  lactated ringers. 1000 milliLiter(s) IV Continuous <Continuous>    PRN Meds  dextrose Oral Gel 15 Gram(s) Oral once PRN        Vital Signs:    T(F): 98.5 (10-06-24 @ 12:19), Max: 98.5 (10-06-24 @ 12:19)  HR: 83 (10-06-24 @ 12:19) (63 - 83)  BP: 169/86 (10-06-24 @ 12:19) (137/66 - 169/86)  RR: 18 (10-06-24 @ 12:19) (18 - 18)  SpO2: --  I&O's Summary    05 Oct 2024 07:  -  06 Oct 2024 07:00  --------------------------------------------------------  IN: 1350 mL / OUT: 700 mL / NET: 650 mL    06 Oct 2024 07:  -  06 Oct 2024 13:38  --------------------------------------------------------  IN: 1043 mL / OUT: 0 mL / NET: 1043 mL      Daily     Daily Weight in k.5 (06 Oct 2024 06:25)  CAPILLARY BLOOD GLUCOSE      POCT Blood Glucose.: 128 mg/dL (06 Oct 2024 11:38)  POCT Blood Glucose.: 182 mg/dL (06 Oct 2024 07:40)  POCT Blood Glucose.: 200 mg/dL (05 Oct 2024 21:15)  POCT Blood Glucose.: 170 mg/dL (05 Oct 2024 16:53)      PHYSICAL EXAM:  GENERAL:  NAD  SKIN: No rashes or lesions  HEENT: Atraumatic. Normocephalic. Anicteric  NECK:  No JVD.   PULMONARY: Clear to ausculation bilaterally. No wheezing. No rales  CVS: Normal S1, S2. Regular rate and rhythm. No murmurs.  ABDOMEN/GI: Soft, Nontender, Nondistended; Bowel sounds are present  EXTREMITIES:  No edema B/L LE.  NEUROLOGIC:  No motor deficit.  PSYCH: Alert & oriented x 3, normal affect      LABS:                        10.3   9.09  )-----------( 202      ( 06 Oct 2024 05:46 )             31.0     10-    140  |  105  |  35[H]  ----------------------------<  170[H]  4.2   |  24  |  1.4    Ca    8.5      06 Oct 2024 05:46  Phos  3.9     10  Mg     2.1     10    TPro  5.3[L]  /  Alb  3.4[L]  /  TBili  0.6  /  DBili  x   /  AST  134[H]  /  ALT  66[H]  /  AlkPhos  83  10        Trop --, CKMB 17.5, CK --, 10-05- @ 09:45      Urinalysis with Rflx Culture (collected 04 Oct 2024 23:05)    Culture - Blood (collected 04 Oct 2024 20:49)  Source: .Blood BLOOD  Preliminary Report (06 Oct 2024 07:01):    No growth at 24 hours    Culture - Blood (collected 04 Oct 2024 20:49)  Source: .Blood BLOOD  Preliminary Report (06 Oct 2024 07:01):    No growth at 24 hours        RADIOLOGY & ADDITIONAL TESTS:  Imaging or report Personally Reviewed:  [ ] YES  [ ] NO -->no new images    Telemetry reviewed independently - NSR, no acute events  EKG reviewed independently -->no new EKGs    Consultant(s) Notes Reviewed:  [x ] YES  [ ] NO  Care Discussed with Consultants/Other Providers [ x] YES  [ ] NO    Case discussed with resident  Care discussed with pt

## 2024-10-06 NOTE — PROGRESS NOTE ADULT - ASSESSMENT
84 years old male patient with pmh of HLD, DM, HTN brought to the ed for fall and suspected syncope.    #Syncope   #Elevated troponin likely type 2 NSTEMI   - EKG: EKG with TWIs in inferolateral leads   - Trops--  370--> 344--> 280   - Cardio: type II NSTEMI given sepsis, get TTE and EP consult  - EP: check serial orthostatic VS, Check TSH / Free T4 , may benefit from ILR if above work up is negative    plan:  - orthostatic vitals  - telemetry  - eeg  - TTE  - follow up cardiology and EP    #Fall  #Left shoulder pain and stiffness  #Transient right arm weakness    - trauma workup negative  - had transient right arm weakness that resolved. Neurology consulted: MR brain without contrast, CTA head and neck, eeg    Plan:  - f/u shoulder xray  - f/u MRI brain and eeg, hold off on CTA for now given arielle    #UTI   #ARIELLE/ATN  - sirs and sepsis absent on admission   - wbc 20k  - urine analysis + Wbc, leukocytes, casts, rbc, bacterias  - ct abdomen:  Findings compatible with urinary bladder infection/cystitis; correlation with urinalysis suggested.  - creat 2.6 ( baseline 1.2) bun 45   - iv fluids. s/p 2700 ml in ed. LR 75 ml/hr    - avoid nephrotoxic meds  - iv ceftriaxone   - fu blood and urine cultures   - US kub rule out hydronephrosis and CKD     #Transaminitis  - , ALT 73  - fu hepatitis panel  - US ruq  - Hold home Lipitor, Ezetimibe   - avoid hepatoxic meds       #HTN  - hold home ramipril due to arielle  - nifedipine prn after checking orthostatics    #DM  - hold home oral meds  - target blood sugar 140 180  - start insulin if needed     #HLD  - hold home Lipitor,  Ezetimibe   - restart once lfts stable     #Gout?  - hold allopurinol due to arielle     Pending: Cardio and EP follow up, TTE, EEG, MRI brain   DVT PPX: heparin   GI PPX: NONE   DIET: DASH  CODE STATUS: FULL

## 2024-10-07 LAB
ALBUMIN SERPL ELPH-MCNC: 3.1 G/DL — LOW (ref 3.5–5.2)
ALP SERPL-CCNC: 86 U/L — SIGNIFICANT CHANGE UP (ref 30–115)
ALT FLD-CCNC: 66 U/L — HIGH (ref 0–41)
ANION GAP SERPL CALC-SCNC: 10 MMOL/L — SIGNIFICANT CHANGE UP (ref 7–14)
AST SERPL-CCNC: 105 U/L — HIGH (ref 0–41)
BASOPHILS # BLD AUTO: 0.03 K/UL — SIGNIFICANT CHANGE UP (ref 0–0.2)
BASOPHILS NFR BLD AUTO: 0.4 % — SIGNIFICANT CHANGE UP (ref 0–1)
BILIRUB SERPL-MCNC: 0.4 MG/DL — SIGNIFICANT CHANGE UP (ref 0.2–1.2)
BUN SERPL-MCNC: 26 MG/DL — HIGH (ref 10–20)
CALCIUM SERPL-MCNC: 8.6 MG/DL — SIGNIFICANT CHANGE UP (ref 8.4–10.5)
CHLORIDE SERPL-SCNC: 107 MMOL/L — SIGNIFICANT CHANGE UP (ref 98–110)
CK SERPL-CCNC: 2197 U/L — HIGH (ref 0–225)
CO2 SERPL-SCNC: 24 MMOL/L — SIGNIFICANT CHANGE UP (ref 17–32)
CREAT SERPL-MCNC: 1.3 MG/DL — SIGNIFICANT CHANGE UP (ref 0.7–1.5)
EGFR: 55 ML/MIN/1.73M2 — LOW
EOSINOPHIL # BLD AUTO: 0.12 K/UL — SIGNIFICANT CHANGE UP (ref 0–0.7)
EOSINOPHIL NFR BLD AUTO: 1.6 % — SIGNIFICANT CHANGE UP (ref 0–8)
GLUCOSE BLDC GLUCOMTR-MCNC: 133 MG/DL — HIGH (ref 70–99)
GLUCOSE BLDC GLUCOMTR-MCNC: 168 MG/DL — HIGH (ref 70–99)
GLUCOSE BLDC GLUCOMTR-MCNC: 170 MG/DL — HIGH (ref 70–99)
GLUCOSE BLDC GLUCOMTR-MCNC: 202 MG/DL — HIGH (ref 70–99)
GLUCOSE SERPL-MCNC: 148 MG/DL — HIGH (ref 70–99)
HCT VFR BLD CALC: 30.2 % — LOW (ref 42–52)
HGB BLD-MCNC: 9.9 G/DL — LOW (ref 14–18)
IMM GRANULOCYTES NFR BLD AUTO: 0.4 % — HIGH (ref 0.1–0.3)
LYMPHOCYTES # BLD AUTO: 1.21 K/UL — SIGNIFICANT CHANGE UP (ref 1.2–3.4)
LYMPHOCYTES # BLD AUTO: 15.7 % — LOW (ref 20.5–51.1)
MAGNESIUM SERPL-MCNC: 2 MG/DL — SIGNIFICANT CHANGE UP (ref 1.8–2.4)
MCHC RBC-ENTMCNC: 31.5 PG — HIGH (ref 27–31)
MCHC RBC-ENTMCNC: 32.8 G/DL — SIGNIFICANT CHANGE UP (ref 32–37)
MCV RBC AUTO: 96.2 FL — HIGH (ref 80–94)
MONOCYTES # BLD AUTO: 0.67 K/UL — HIGH (ref 0.1–0.6)
MONOCYTES NFR BLD AUTO: 8.7 % — SIGNIFICANT CHANGE UP (ref 1.7–9.3)
NEUTROPHILS # BLD AUTO: 5.66 K/UL — SIGNIFICANT CHANGE UP (ref 1.4–6.5)
NEUTROPHILS NFR BLD AUTO: 73.2 % — SIGNIFICANT CHANGE UP (ref 42.2–75.2)
NRBC # BLD: 0 /100 WBCS — SIGNIFICANT CHANGE UP (ref 0–0)
PHOSPHATE SERPL-MCNC: 3.8 MG/DL — SIGNIFICANT CHANGE UP (ref 2.1–4.9)
PLATELET # BLD AUTO: 215 K/UL — SIGNIFICANT CHANGE UP (ref 130–400)
PMV BLD: 10.1 FL — SIGNIFICANT CHANGE UP (ref 7.4–10.4)
POTASSIUM SERPL-MCNC: 4.1 MMOL/L — SIGNIFICANT CHANGE UP (ref 3.5–5)
POTASSIUM SERPL-SCNC: 4.1 MMOL/L — SIGNIFICANT CHANGE UP (ref 3.5–5)
PROT SERPL-MCNC: 5.2 G/DL — LOW (ref 6–8)
RBC # BLD: 3.14 M/UL — LOW (ref 4.7–6.1)
RBC # FLD: 12.3 % — SIGNIFICANT CHANGE UP (ref 11.5–14.5)
SODIUM SERPL-SCNC: 141 MMOL/L — SIGNIFICANT CHANGE UP (ref 135–146)
WBC # BLD: 7.72 K/UL — SIGNIFICANT CHANGE UP (ref 4.8–10.8)
WBC # FLD AUTO: 7.72 K/UL — SIGNIFICANT CHANGE UP (ref 4.8–10.8)

## 2024-10-07 PROCEDURE — 99232 SBSQ HOSP IP/OBS MODERATE 35: CPT

## 2024-10-07 PROCEDURE — 99233 SBSQ HOSP IP/OBS HIGH 50: CPT

## 2024-10-07 RX ORDER — SODIUM CHLORIDE IRRIG SOLUTION 0.9 %
1000 SOLUTION, IRRIGATION IRRIGATION
Refills: 0 | Status: DISCONTINUED | OUTPATIENT
Start: 2024-10-07 | End: 2024-10-09

## 2024-10-07 RX ORDER — ATORVASTATIN CALCIUM 10 MG/1
20 TABLET, FILM COATED ORAL AT BEDTIME
Refills: 0 | Status: DISCONTINUED | OUTPATIENT
Start: 2024-10-07 | End: 2024-10-10

## 2024-10-07 RX ADMIN — ATORVASTATIN CALCIUM 20 MILLIGRAM(S): 10 TABLET, FILM COATED ORAL at 21:15

## 2024-10-07 RX ADMIN — INSULIN GLARGINE 9 UNIT(S): 300 INJECTION, SOLUTION SUBCUTANEOUS at 21:14

## 2024-10-07 RX ADMIN — Medication 100 MILLIGRAM(S): at 05:39

## 2024-10-07 RX ADMIN — Medication 2: at 12:22

## 2024-10-07 RX ADMIN — Medication 81 MILLIGRAM(S): at 12:23

## 2024-10-07 RX ADMIN — Medication 5000 UNIT(S): at 13:18

## 2024-10-07 RX ADMIN — Medication 3 UNIT(S): at 12:22

## 2024-10-07 RX ADMIN — Medication 10 MILLIGRAM(S): at 14:54

## 2024-10-07 RX ADMIN — Medication 1: at 08:10

## 2024-10-07 RX ADMIN — Medication 5000 UNIT(S): at 21:15

## 2024-10-07 RX ADMIN — Medication 100 MILLILITER(S): at 03:11

## 2024-10-07 RX ADMIN — Medication 5000 UNIT(S): at 05:39

## 2024-10-07 RX ADMIN — Medication 3 UNIT(S): at 17:22

## 2024-10-07 RX ADMIN — Medication 3 UNIT(S): at 08:11

## 2024-10-07 NOTE — PROGRESS NOTE ADULT - ATTENDING COMMENTS
Syncope in the setting of a UTI  No cardiac history    Feels well.  No complaints.    ECHO reviewed      - Lexiscan NST  - TFTs  - ILR prior to discharge Syncope in the setting of a UTI  No cardiac history    Feels well.  No complaints.    EKG:  SB with inferolateral ischemia  ECHO reviewed      - Lexiscan NST  - TFTs  - ILR prior to discharge

## 2024-10-07 NOTE — PROGRESS NOTE ADULT - SUBJECTIVE AND OBJECTIVE BOX
CHIEF COMPLAINT:    Patient is a 84y old  Male who presents with a chief complaint of Fall     INTERVAL HPI/OVERNIGHT EVENTS:    Patient seen and examined at bedside. No acute overnight events occurred.    ROS: All other systems are negative.    Medications:  Standing  aspirin  chewable 81 milliGRAM(s) Oral daily  cefTRIAXone   IVPB 1000 milliGRAM(s) IV Intermittent every 24 hours  dextrose 5%. 1000 milliLiter(s) IV Continuous <Continuous>  dextrose 5%. 1000 milliLiter(s) IV Continuous <Continuous>  dextrose 50% Injectable 25 Gram(s) IV Push once  dextrose 50% Injectable 12.5 Gram(s) IV Push once  dextrose 50% Injectable 25 Gram(s) IV Push once  glucagon  Injectable 1 milliGRAM(s) IntraMuscular once  heparin   Injectable 5000 Unit(s) SubCutaneous every 8 hours  insulin glargine Injectable (LANTUS) 9 Unit(s) SubCutaneous at bedtime  insulin lispro (ADMELOG) corrective regimen sliding scale   SubCutaneous three times a day before meals  insulin lispro Injectable (ADMELOG) 3 Unit(s) SubCutaneous three times a day before meals  lactated ringers. 1000 milliLiter(s) IV Continuous <Continuous>    PRN Meds  dextrose Oral Gel 15 Gram(s) Oral once PRN        Vital Signs:    T(F): 98 (10-07-24 @ 05:10), Max: 98 (10-07-24 @ 05:10)  HR: 66 (10-07-24 @ 05:10) (66 - 71)  BP: 152/75 (10-07-24 @ 05:10) (119/69 - 152/75)  RR: 18 (10-07-24 @ 05:10) (18 - 18)  SpO2: 97% (10-06-24 @ 14:12) (97% - 97%)  I&O's Summary    06 Oct 2024 07:01  -  07 Oct 2024 07:00  --------------------------------------------------------  IN: 1893 mL / OUT: 450 mL / NET: 1443 mL    07 Oct 2024 07:01  -  07 Oct 2024 12:54  --------------------------------------------------------  IN: 866 mL / OUT: 0 mL / NET: 866 mL      Daily     Daily   CAPILLARY BLOOD GLUCOSE      POCT Blood Glucose.: 202 mg/dL (07 Oct 2024 11:20)  POCT Blood Glucose.: 168 mg/dL (07 Oct 2024 07:40)  POCT Blood Glucose.: 209 mg/dL (06 Oct 2024 21:16)  POCT Blood Glucose.: 170 mg/dL (06 Oct 2024 16:42)      PHYSICAL EXAM:  GENERAL:  NAD  SKIN: No rashes or lesions  HEENT: Atraumatic. Normocephalic. Anicteric  NECK:  No JVD.   PULMONARY: Clear to ausculation bilaterally. No wheezing. No rales  CVS: Normal S1, S2. Regular rate and rhythm. No murmurs.  ABDOMEN/GI: Soft, Nontender, Nondistended; Bowel sounds are present  EXTREMITIES:  No edema B/L LE.  NEUROLOGIC:  No motor deficit.  PSYCH: Alert & oriented x 3, normal affect      LABS:                        9.9    7.72  )-----------( 215      ( 07 Oct 2024 06:06 )             30.2     10-07    141  |  107  |  26[H]  ----------------------------<  148[H]  4.1   |  24  |  1.3    Ca    8.6      07 Oct 2024 06:06  Phos  3.8     10-07  Mg     2.0     10-07    TPro  5.2[L]  /  Alb  3.1[L]  /  TBili  0.4  /  DBili  x   /  AST  105[H]  /  ALT  66[H]  /  AlkPhos  86  10-07        Trop --, CKMB 17.5, CK --, 10-05-24 @ 09:45      Urinalysis with Rflx Culture (collected 04 Oct 2024 23:05)    Culture - Urine (collected 04 Oct 2024 22:42)  Source: Clean Catch None  Preliminary Report (06 Oct 2024 16:11):    >100,000 CFU/ml Escherichia coli    Culture - Blood (collected 04 Oct 2024 20:49)  Source: .Blood BLOOD  Preliminary Report (07 Oct 2024 07:01):    No growth at 48 Hours    Culture - Blood (collected 04 Oct 2024 20:49)  Source: .Blood BLOOD  Preliminary Report (07 Oct 2024 07:01):    No growth at 48 Hours        RADIOLOGY & ADDITIONAL TESTS:  Imaging or report Personally Reviewed:  [ ] YES  [ ] NO -->no new images    Telemetry reviewed independently - NSR, no acute events  EKG reviewed independently -->no new EKGs    Consultant(s) Notes Reviewed:  [ ] YES  [ ] NO  Care Discussed with Consultants/Other Providers [ ] YES  [ ] NO    Case discussed with resident  Care discussed with pt         CHIEF COMPLAINT:    Patient is a 84y old  Male who presents with a chief complaint of Fall     INTERVAL HPI/OVERNIGHT EVENTS:    Patient seen and examined at bedside. No acute overnight events occurred.    ROS: Denies chest pain. All other systems are negative.    Medications:  Standing  aspirin  chewable 81 milliGRAM(s) Oral daily  cefTRIAXone   IVPB 1000 milliGRAM(s) IV Intermittent every 24 hours  dextrose 5%. 1000 milliLiter(s) IV Continuous <Continuous>  dextrose 5%. 1000 milliLiter(s) IV Continuous <Continuous>  dextrose 50% Injectable 25 Gram(s) IV Push once  dextrose 50% Injectable 12.5 Gram(s) IV Push once  dextrose 50% Injectable 25 Gram(s) IV Push once  glucagon  Injectable 1 milliGRAM(s) IntraMuscular once  heparin   Injectable 5000 Unit(s) SubCutaneous every 8 hours  insulin glargine Injectable (LANTUS) 9 Unit(s) SubCutaneous at bedtime  insulin lispro (ADMELOG) corrective regimen sliding scale   SubCutaneous three times a day before meals  insulin lispro Injectable (ADMELOG) 3 Unit(s) SubCutaneous three times a day before meals  lactated ringers. 1000 milliLiter(s) IV Continuous <Continuous>    PRN Meds  dextrose Oral Gel 15 Gram(s) Oral once PRN        Vital Signs:    T(F): 98 (10-07-24 @ 05:10), Max: 98 (10-07-24 @ 05:10)  HR: 66 (10-07-24 @ 05:10) (66 - 71)  BP: 152/75 (10-07-24 @ 05:10) (119/69 - 152/75)  RR: 18 (10-07-24 @ 05:10) (18 - 18)  SpO2: 97% (10-06-24 @ 14:12) (97% - 97%)  I&O's Summary    06 Oct 2024 07:01  -  07 Oct 2024 07:00  --------------------------------------------------------  IN: 1893 mL / OUT: 450 mL / NET: 1443 mL    07 Oct 2024 07:01  -  07 Oct 2024 12:54  --------------------------------------------------------  IN: 866 mL / OUT: 0 mL / NET: 866 mL      POCT Blood Glucose.: 202 mg/dL (07 Oct 2024 11:20)  POCT Blood Glucose.: 168 mg/dL (07 Oct 2024 07:40)  POCT Blood Glucose.: 209 mg/dL (06 Oct 2024 21:16)  POCT Blood Glucose.: 170 mg/dL (06 Oct 2024 16:42)      PHYSICAL EXAM:  GENERAL:  NAD  SKIN: No rashes or lesions  HEENT: Atraumatic. Normocephalic. Anicteric  NECK:  No JVD.   PULMONARY: Clear to ausculation bilaterally. No wheezing. No rales  CVS: Normal S1, S2. Regular rate and rhythm. No murmurs.  ABDOMEN/GI: Soft, Nontender, Nondistended; Bowel sounds are present  EXTREMITIES:  No edema B/L LE.  NEUROLOGIC:  No motor deficit.  PSYCH: Alert & oriented x 3, normal affect      LABS:                        9.9    7.72  )-----------( 215      ( 07 Oct 2024 06:06 )             30.2     10-07    141  |  107  |  26[H]  ----------------------------<  148[H]  4.1   |  24  |  1.3    Ca    8.6      07 Oct 2024 06:06  Phos  3.8     10-07  Mg     2.0     10-07    TPro  5.2[L]  /  Alb  3.1[L]  /  TBili  0.4  /  DBili  x   /  AST  105[H]  /  ALT  66[H]  /  AlkPhos  86  10-07        Trop --, CKMB 17.5, CK --, 10-05-24 @ 09:45      Urinalysis with Rflx Culture (collected 04 Oct 2024 23:05)    Culture - Urine (collected 04 Oct 2024 22:42)  Source: Clean Catch None  Preliminary Report (06 Oct 2024 16:11):    >100,000 CFU/ml Escherichia coli    Culture - Blood (collected 04 Oct 2024 20:49)  Source: .Blood BLOOD  Preliminary Report (07 Oct 2024 07:01):    No growth at 48 Hours    Culture - Blood (collected 04 Oct 2024 20:49)  Source: .Blood BLOOD  Preliminary Report (07 Oct 2024 07:01):    No growth at 48 Hours        RADIOLOGY & ADDITIONAL TESTS:  Imaging or report Personally Reviewed:  [ ] YES  [ ] NO -->no new images    Telemetry reviewed independently - NSR, no acute events  EKG reviewed independently -->no new EKGs    Consultant(s) Notes Reviewed:  [ ] YES  [ ] NO  Care Discussed with Consultants/Other Providers [ ] YES  [ ] NO    Case discussed with resident  Care discussed with pt

## 2024-10-07 NOTE — PROGRESS NOTE ADULT - SUBJECTIVE AND OBJECTIVE BOX
24H events:    Patient is a 84y old Male who presents with a chief complaint of   Primary diagnosis of Elevated troponin       Today is hospital day 3d. This morning patient was seen and examined at bedside, resting comfortably in bed.      PAST MEDICAL & SURGICAL HISTORY  DM (diabetes mellitus)    HTN (hypertension)    HLD (hyperlipidemia)    H/O knee surgery      SOCIAL HISTORY:  Social History:      ALLERGIES:  No Known Allergies    MEDICATIONS:  STANDING MEDICATIONS  aspirin  chewable 81 milliGRAM(s) Oral daily  cefTRIAXone   IVPB 1000 milliGRAM(s) IV Intermittent every 24 hours  dextrose 5%. 1000 milliLiter(s) IV Continuous <Continuous>  dextrose 5%. 1000 milliLiter(s) IV Continuous <Continuous>  dextrose 50% Injectable 12.5 Gram(s) IV Push once  dextrose 50% Injectable 25 Gram(s) IV Push once  dextrose 50% Injectable 25 Gram(s) IV Push once  glucagon  Injectable 1 milliGRAM(s) IntraMuscular once  heparin   Injectable 5000 Unit(s) SubCutaneous every 8 hours  insulin glargine Injectable (LANTUS) 9 Unit(s) SubCutaneous at bedtime  insulin lispro (ADMELOG) corrective regimen sliding scale   SubCutaneous three times a day before meals  insulin lispro Injectable (ADMELOG) 3 Unit(s) SubCutaneous three times a day before meals  lactated ringers. 1000 milliLiter(s) IV Continuous <Continuous>    PRN MEDICATIONS  dextrose Oral Gel 15 Gram(s) Oral once PRN        LABS:                        9.9    7.72  )-----------( 215      ( 07 Oct 2024 06:06 )             30.2     10-07    141  |  107  |  26[H]  ----------------------------<  148[H]  4.1   |  24  |  1.3    Ca    8.6      07 Oct 2024 06:06  Phos  3.8     10-07  Mg     2.0     10-07    TPro  5.2[L]  /  Alb  3.1[L]  /  TBili  0.4  /  DBili  x   /  AST  105[H]  /  ALT  66[H]  /  AlkPhos  86  10-07      Urinalysis Basic - ( 07 Oct 2024 06:06 )    Color: x / Appearance: x / SG: x / pH: x  Gluc: 148 mg/dL / Ketone: x  / Bili: x / Urobili: x   Blood: x / Protein: x / Nitrite: x   Leuk Esterase: x / RBC: x / WBC x   Sq Epi: x / Non Sq Epi: x / Bacteria: x            Urinalysis with Rflx Culture (collected 04 Oct 2024 23:05)    Culture - Urine (collected 04 Oct 2024 22:42)  Source: Clean Catch None  Preliminary Report (06 Oct 2024 16:11):    >100,000 CFU/ml Escherichia coli    Culture - Blood (collected 04 Oct 2024 20:49)  Source: .Blood BLOOD  Preliminary Report (07 Oct 2024 07:01):    No growth at 48 Hours    Culture - Blood (collected 04 Oct 2024 20:49)  Source: .Blood BLOOD  Preliminary Report (07 Oct 2024 07:01):    No growth at 48 Hours      RADIOLOGY:  TTE Echo Complete w/o Contrast w/ Doppler (10.05.24)   1. Low-normal global left ventricular systolicfunction. Left ventricular ejection fraction, by visual estimation, is 50 to 55%. Moderate (Grade 2) diastolic dysfunction. No regional wall motion abnormalities. Increased LV wall thickness (IVSd 1.4cm) with normal mass index.   2. Normal right ventricular size and function.   3. Mildly enlarged left atrium.   4. No hemodynamically significant valvular disease.   5. Adequate TR velocity was not obtained to accurately assess RVSP.   6. There is no evidence of pericardial effusion.    VITALS:   T(F): 98  HR: 66  BP: 152/75  RR: 18  SpO2: 97%    PHYSICAL EXAM:  GENERAL: NAD, well-groomed, well-developed  HEAD:  Atraumatic, Normocephalic  EYES: EOMI  NECK: Supple  NERVOUS SYSTEM:  Alert & Oriented X3, non focal   CHEST/LUNG: Clear to auscultation bilaterally; No rales, rhonchi, wheezing, or rubs  HEART: Regular rate and rhythm; No murmurs, rubs, or gallops  ABDOMEN: Soft, Nontender, Nondistended; Bowel sounds present  EXTREMITIES:  2+ Peripheral Pulses, No clubbing, cyanosis, or edema  LYMPH: No lymphadenopathy noted  SKIN: No rashes or lesions       24H events:    Patient is a 84y old Male who presents with a chief complaint of   Primary diagnosis of Elevated troponin    Today is hospital day 3d. This morning patient was seen and examined at bedside, resting comfortably in bed.        PAST MEDICAL & SURGICAL HISTORY  DM (diabetes mellitus)    HTN (hypertension)    HLD (hyperlipidemia)    H/O knee surgery      MEDICATIONS:  STANDING MEDICATIONS  aspirin  chewable 81 milliGRAM(s) Oral daily  cefTRIAXone   IVPB 1000 milliGRAM(s) IV Intermittent every 24 hours  dextrose 5%. 1000 milliLiter(s) IV Continuous <Continuous>  dextrose 5%. 1000 milliLiter(s) IV Continuous <Continuous>  dextrose 50% Injectable 12.5 Gram(s) IV Push once  dextrose 50% Injectable 25 Gram(s) IV Push once  dextrose 50% Injectable 25 Gram(s) IV Push once  glucagon  Injectable 1 milliGRAM(s) IntraMuscular once  heparin   Injectable 5000 Unit(s) SubCutaneous every 8 hours  insulin glargine Injectable (LANTUS) 9 Unit(s) SubCutaneous at bedtime  insulin lispro (ADMELOG) corrective regimen sliding scale   SubCutaneous three times a day before meals  insulin lispro Injectable (ADMELOG) 3 Unit(s) SubCutaneous three times a day before meals  lactated ringers. 1000 milliLiter(s) IV Continuous <Continuous>    PRN MEDICATIONS  dextrose Oral Gel 15 Gram(s) Oral once PRN        LABS:                        9.9    7.72  )-----------( 215      ( 07 Oct 2024 06:06 )             30.2     10-07    141  |  107  |  26[H]  ----------------------------<  148[H]  4.1   |  24  |  1.3    Ca    8.6      07 Oct 2024 06:06  Phos  3.8     10-07  Mg     2.0     10-07    TPro  5.2[L]  /  Alb  3.1[L]  /  TBili  0.4  /  DBili  x   /  AST  105[H]  /  ALT  66[H]  /  AlkPhos  86  10-07        VITALS:   T(F): 98  HR: 66  BP: 152/75  RR: 18  SpO2: 97%    PHYSICAL EXAM:  GENERAL: NAD  EYES: EOMI  NECK: Supple  NERVOUS SYSTEM:  Alert & Oriented X3, non focal   CHEST/LUNG: Clear to auscultation bilaterally; No rales, rhonchi, wheezing, or rubs  HEART: Regular rate and rhythm; No murmurs, rubs, or gallops  ABDOMEN: Soft, Nontender, Nondistended  EXTREMITIES:  No clubbing, cyanosis, or edema        < from: TTE Echo Complete w/o Contrast w/ Doppler (10.05.24 @ 12:34) >  Summary:   1. Low-normal global left ventricular systolic function. Left   ventricular ejection fraction, by visual estimation, is 50 to 55%.   Moderate (Grade 2) diastolic dysfunction. No regional wall motion   abnormalities. Increased LV wall thickness (IVSd 1.4cm) with normal mass   index.   2. Normal right ventricular size and function.   3. Mildly enlarged left atrium.   4. No hemodynamically significant valvular disease.   5. Adequate TR velocity was not obtained to accurately assess RVSP.   6. There is no evidence of pericardial effusion.    < end of copied text >

## 2024-10-07 NOTE — PROGRESS NOTE ADULT - ASSESSMENT
84 yr old male with hx of HLD, DM II, HTN  presenting for Syncope     # Syncope   # Type 2 MI   # Rhabdo  # DM /HTN  - pt asymptomatic feels well, denies any chest pain   - no events on tele   - Trops:  370--> 344--> 280  - CK: 6000 --> 2100   - EKG: NSR with TWIs in inferolateral leads   - ECHO (10.05.24): EF 50 to 55%. Moderate (Grade 2) diastolic dysfunction. No regional wall motion abnormalities. Increased LV wall thickness (IVSd 1.4cm) with normal mass index.  - MRI brain negative  - c/w ASA  - resume home ramipril   - start atorvastatin 20mg   - Nuclear stress test vs Cath     **************Pending attending assessment       84 yr old male with hx of HLD, DM II, HTN  presenting for Syncope     # Syncope   # Type 2 MI   # Rhabdo  # DM /HTN  - pt asymptomatic feels well, denies any chest pain   - no events on tele   - Trops:  370--> 344--> 280  - CK: 6000 --> 2100   - EKG: NSR with TWIs in inferolateral leads   - ECHO (10.05.24): EF 50 to 55%. Moderate (Grade 2) diastolic dysfunction. No regional wall motion abnormalities. Increased LV wall thickness (IVSd 1.4cm) with normal mass index.  - MRI brain negative  - c/w ASA  - resume home ramipril   - start atorvastatin 20mg   - Nuclear stress test       84 yr old male with hx of HLD, DM II, HTN presenting for Syncope     # Syncope   # Type 2 MI   # Rhabdo  # DM / HTN    - pt asymptomatic feels well, denies any chest pain   - no events on tele   - Trops:  370--> 344--> 280  - CK: 6000 --> 2100   - EKG: NSR with TWIs in inferolateral leads   - ECHO (10.05.24): EF 50 to 55%. Moderate (Grade 2) diastolic dysfunction. No regional wall motion abnormalities. Increased LV wall thickness (IVSd 1.4cm) with normal mass index.  - MRI brain negative    Recommend:  - c/w ASA  - resume home ramipril   - start atorvastatin 20mg   - Nuclear stress test

## 2024-10-07 NOTE — PROGRESS NOTE ADULT - ASSESSMENT
85 yo M PMHx HLD, DM II, HTN brought to the ed for fall and suspected syncope. Reports that he was about to walk upstairs from his basement. He was on the first step going up. He reports he doesn't recall anything else but work up on the floor. He reports his pants were wet but reports that he thinks his son just mopped the floor. When he tried to get up his left arm was weak. He could lift up his left arm but he could not keep it up. He denies any dizziness, HA, etc prior to event. After a while he reports he forced himself to get up and went about his day and felt well. Reports intermittent burning over past week with urination.    Syncope   - unclear etiology  - sudden LOC suggests cardiogenic cause  - EKG: EKG with TWIs in inferolateral leads   - Trops--  370--> 344--> 280   - Cardio: type II NSTEMI given sepsis, get TTE and EP consult  - EP: check serial orthostatic VS, Check TSH / Free T4 , may benefit from ILR if above work up is negative  - awaiting further cardiac work up      Left shoulder pain and stiffness  Transient LEFT arm weakness    - MRI brain negative  - case d/w neuro attending-unlikely TIA, more likely nerve injury from fall  - no need for CTA    ARIELLE  - likely from rhabdo  - CK eleavted 6000  - SCr improving  - CK continues to downtrend to 2000 today  - c/w IVF for now    Pyuria  - has had intermittent dysuria  - c/w rocephin  - follow up cultures       Transaminitis  - ggt normal  - RUQ sono normal  - suggests MSK cause of elevatation especially since CK elevated  - can resume Lipitior      HTN  - hold home ramipril due to arielle  - can likelt resume in am    DM  - hold home oral meds  - target blood sugar 140 180  - start insulin if needed     HLD  - resume Lipitor    Gout?  - hold allopurinol due to arielle     DVT px    #Progress Note Handoff:  Pending (specify): resolution of rhabdo, possible ischemic work up  Family discussion: As above with patient and son  Disposition: Home__x_/SNF___/Other________/Unknown at this time________

## 2024-10-07 NOTE — PROGRESS NOTE ADULT - ASSESSMENT
84 years old male patient with pmh of HLD, DM, HTN brought to the ed for fall and suspected syncope.    #Syncope   #Elevated troponin likely type 2 NSTEMI   - EKG: EKG with TWIs in inferolateral leads   - Trops--  370--> 344--> 280  CK elevated could cause some troponin elevation    - Cardio: type II NSTEMI given sepsis, Sepsis resolved cardio reconsulted for possible nuclear stress test vs cath  - EP: check serial orthostatic VS, Check TSH / Free T4 , may benefit from ILR if above work up is negative    plan:  - orthostatic vitals  - telemetry  - eeg Negative  - TTE  - follow up cardiology and EP    #Fall  #Left shoulder pain and stiffness  #Transient right arm weakness    - trauma workup negative  - had transient right arm weakness that resolved. Neurology consulted: MR brain without contrast no acute pathology eeg no seizure, no need for CTA      #UTI   #ARIELLE/ATN  - sirs and sepsis absent on admission   - wbc 20k  - urine analysis + Wbc, leukocytes, casts, rbc, bacterias  - ct abdomen:  Findings compatible with urinary bladder infection/cystitis; correlation with urinalysis suggested.  - creat 2.6 ( baseline 1.2) bun 45   - iv fluids. s/p 2700 ml in ed. LR 75 ml/hr    - avoid nephrotoxic meds  - iv ceftriaxone   - fu blood and urine cultures negative so far  - US kub rule out hydronephrosis and CKD No hydronephrosis     #Transaminitis  Could be med induced   - , ALT 73  - hepatitis panel negative   - US ruq unremarkable   - Hold home Lipitor, Ezetimibe   - avoid hepatoxic meds       #HTN  - hold home ramipril due to arielle  - nifedipine prn after checking orthostatics    #DM  - hold home oral meds  - target blood sugar 140 180  - start insulin if needed     #HLD  - hold home Lipitor,  Ezetimibe   - restart once lfts stable     #Gout?  - hold allopurinol due to arielle     Pending: Cardio and EP follow up,   DVT PPX: heparin   GI PPX: NONE   DIET: DASH  CODE STATUS: FULL

## 2024-10-07 NOTE — PROGRESS NOTE ADULT - SUBJECTIVE AND OBJECTIVE BOX
SUBJECTIVE/OVERNIGHT EVENTS  Today is hospital day 3d. This morning patient was seen and examined at bedside, resting comfortably in bed. No acute or major events overnight.    HOSPITAL COURSE  Day 1:   Day 2:   Day 3:     CODE STATUS:    FAMILY COMMUNICATION  Contact date:  Name of person contacted:  Relationship to patient:  Communication details:    MEDICATIONS  STANDING MEDICATIONS  aspirin  chewable 81 milliGRAM(s) Oral daily  cefTRIAXone   IVPB 1000 milliGRAM(s) IV Intermittent every 24 hours  dextrose 5%. 1000 milliLiter(s) IV Continuous <Continuous>  dextrose 5%. 1000 milliLiter(s) IV Continuous <Continuous>  dextrose 50% Injectable 25 Gram(s) IV Push once  dextrose 50% Injectable 12.5 Gram(s) IV Push once  dextrose 50% Injectable 25 Gram(s) IV Push once  glucagon  Injectable 1 milliGRAM(s) IntraMuscular once  heparin   Injectable 5000 Unit(s) SubCutaneous every 8 hours  insulin glargine Injectable (LANTUS) 9 Unit(s) SubCutaneous at bedtime  insulin lispro (ADMELOG) corrective regimen sliding scale   SubCutaneous three times a day before meals  insulin lispro Injectable (ADMELOG) 3 Unit(s) SubCutaneous three times a day before meals  lactated ringers. 1000 milliLiter(s) IV Continuous <Continuous>    PRN MEDICATIONS  dextrose Oral Gel 15 Gram(s) Oral once PRN    VITALS  T(F): 98 (10-07-24 @ 05:10), Max: 98.5 (10-06-24 @ 12:19)  HR: 66 (10-07-24 @ 05:10) (66 - 83)  BP: 152/75 (10-07-24 @ 05:10) (119/69 - 169/86)  RR: 18 (10-07-24 @ 05:10) (18 - 18)  SpO2: 97% (10-06-24 @ 14:12) (97% - 97%)  POCT Blood Glucose.: 168 mg/dL (10-07-24 @ 07:40)  POCT Blood Glucose.: 209 mg/dL (10-06-24 @ 21:16)  POCT Blood Glucose.: 170 mg/dL (10-06-24 @ 16:42)  POCT Blood Glucose.: 128 mg/dL (10-06-24 @ 11:38)    PHYSICAL EXAM  GENERAL  ( x ) NAD, lying in bed comfortably     (  ) obtunded     (  ) lethargic     (  ) somnolent    HEAD  (x  ) Atraumatic     (  ) hematoma     (  ) laceration (specify location:       )     NECK  ( x ) Supple     (  ) neck stiffness     (  ) nuchal rigidity     (  )  no JVD     (  ) JVD present ( -- cm)    HEART  Rate -->  ( x ) normal rate    (  ) bradycardic    (  ) tachycardic  Rhythm -->  ( x ) regular    (  ) regularly irregular    (  ) irregularly irregular  Murmurs -->  ( x ) normal s1/s2    (  ) systolic murmur    (  ) diastolic murmur    (  ) continuous murmur     (  ) S3 present    (  ) S4 present    LUNGS  ( x )Unlabored respirations     (  ) tachypnea  ( x ) B/L air entry     (  ) decreased breath sounds in:  (location     )    (  x) no adventitious sound     (  ) crackles     (  ) wheezing      (  ) rhonchi      (specify location:       )  (  ) chest wall tenderness (specify location:       )    ABDOMEN  (  x) Soft     (  ) tense   |   (  ) nondistended     (  ) distended   |   ( x ) +BS     (  ) hypoactive bowel sounds     (  ) hyperactive bowel sounds  (  x) nontender     (  ) RUQ tenderness     (  ) RLQ tenderness     (  ) LLQ tenderness     (  ) epigastric tenderness     (  ) diffuse tenderness  (  ) Splenomegaly      (  ) Hepatomegaly      (  ) Jaundice     (  ) ecchymosis     EXTREMITIES  (x  ) Normal     (  ) Rash     (  ) ecchymosis     (  ) varicose veins      (  ) pitting edema     (  ) non-pitting edema   (  ) ulceration     (  ) gangrene:     (location:     )    NERVOUS SYSTEM  (  x) A&Ox3     (  ) confused     (  ) lethargic  CN II-XII:     (  ) Intact     (  ) focal deficits  (Specify:     )   Upper extremities:     (  ) strength X/5     (  ) focal deficit (specify:    )  Lower extremities:     (  ) strength  X/5    (  ) focal deficit (specify:    )    SKIN  (  ) No rashes or lesions     (  ) maculopapular rash     (  ) pustules     (  ) vesicles     (  ) ulcer     (  ) ecchymosis     (specify location:     )    (  ) Indwelling Jernigan Catheter   Date insterted:    Reason (  ) Critical illness     (  ) urinary retention    (  ) Accurate Ins/Outs Monitoring     (  ) CMO patient    (  ) Central Line  Date inserted:  Location: (  ) Right IJ   (  ) Left IJ   (  ) Right Fem   (  ) Left Fem    (  ) SPC  (  ) pigtail  (  ) PEG tube  (  ) colostomy  (  ) jejunostomy  (  ) U-Dall    LABS             9.9    7.72  )-----------( 215      ( 10-07-24 @ 06:06 )             30.2     141  |  107  |  26  -------------------------<  148   10-07-24 @ 06:06  4.1  |  24  |  1.3    Ca      8.6     10-07-24 @ 06:06  Phos   3.8     10-07-24 @ 06:06  Mg     2.0     10-07-24 @ 06:06    TPro  5.2  /  Alb  3.1  /  TBili  0.4  /  DBili  x   /  AST  105  /  ALT  66  /  AlkPhos  86  /  GGT  x     10-07-24 @ 06:06      CKMB Units: 17.5 ng/mL (10-05-24 @ 09:45)  Troponin T, High Sensitivity Result: 274 ng/L (10-05-24 @ 05:52)  Troponin T, High Sensitivity Result: 280 ng/L (10-05-24 @ 00:50)    Urinalysis Basic - ( 07 Oct 2024 06:06 )    Color: x / Appearance: x / SG: x / pH: x  Gluc: 148 mg/dL / Ketone: x  / Bili: x / Urobili: x   Blood: x / Protein: x / Nitrite: x   Leuk Esterase: x / RBC: x / WBC x   Sq Epi: x / Non Sq Epi: x / Bacteria: x          Urinalysis with Rflx Culture (collected 04 Oct 2024 23:05)    Culture - Urine (collected 04 Oct 2024 22:42)  Source: Clean Catch None  Preliminary Report (06 Oct 2024 16:11):    >100,000 CFU/ml Escherichia coli    Culture - Blood (collected 04 Oct 2024 20:49)  Source: .Blood BLOOD  Preliminary Report (07 Oct 2024 07:01):    No growth at 48 Hours    Culture - Blood (collected 04 Oct 2024 20:49)  Source: .Blood BLOOD  Preliminary Report (07 Oct 2024 07:01):    No growth at 48 Hours      IMAGING

## 2024-10-08 LAB
ANION GAP SERPL CALC-SCNC: 11 MMOL/L — SIGNIFICANT CHANGE UP (ref 7–14)
BASOPHILS # BLD AUTO: 0.04 K/UL — SIGNIFICANT CHANGE UP (ref 0–0.2)
BASOPHILS NFR BLD AUTO: 0.6 % — SIGNIFICANT CHANGE UP (ref 0–1)
BUN SERPL-MCNC: 22 MG/DL — HIGH (ref 10–20)
CALCIUM SERPL-MCNC: 8.3 MG/DL — LOW (ref 8.4–10.5)
CHLORIDE SERPL-SCNC: 107 MMOL/L — SIGNIFICANT CHANGE UP (ref 98–110)
CK SERPL-CCNC: 1308 U/L — HIGH (ref 0–225)
CO2 SERPL-SCNC: 22 MMOL/L — SIGNIFICANT CHANGE UP (ref 17–32)
CREAT SERPL-MCNC: 1.2 MG/DL — SIGNIFICANT CHANGE UP (ref 0.7–1.5)
EGFR: 60 ML/MIN/1.73M2 — SIGNIFICANT CHANGE UP
EOSINOPHIL # BLD AUTO: 0.2 K/UL — SIGNIFICANT CHANGE UP (ref 0–0.7)
EOSINOPHIL NFR BLD AUTO: 2.9 % — SIGNIFICANT CHANGE UP (ref 0–8)
GLUCOSE BLDC GLUCOMTR-MCNC: 172 MG/DL — HIGH (ref 70–99)
GLUCOSE BLDC GLUCOMTR-MCNC: 180 MG/DL — HIGH (ref 70–99)
GLUCOSE BLDC GLUCOMTR-MCNC: 185 MG/DL — HIGH (ref 70–99)
GLUCOSE BLDC GLUCOMTR-MCNC: 357 MG/DL — HIGH (ref 70–99)
GLUCOSE SERPL-MCNC: 162 MG/DL — HIGH (ref 70–99)
HCT VFR BLD CALC: 30.1 % — LOW (ref 42–52)
HGB BLD-MCNC: 9.8 G/DL — LOW (ref 14–18)
IMM GRANULOCYTES NFR BLD AUTO: 0.7 % — HIGH (ref 0.1–0.3)
LYMPHOCYTES # BLD AUTO: 1.34 K/UL — SIGNIFICANT CHANGE UP (ref 1.2–3.4)
LYMPHOCYTES # BLD AUTO: 19.7 % — LOW (ref 20.5–51.1)
MAGNESIUM SERPL-MCNC: 1.7 MG/DL — LOW (ref 1.8–2.4)
MCHC RBC-ENTMCNC: 31.6 PG — HIGH (ref 27–31)
MCHC RBC-ENTMCNC: 32.6 G/DL — SIGNIFICANT CHANGE UP (ref 32–37)
MCV RBC AUTO: 97.1 FL — HIGH (ref 80–94)
MONOCYTES # BLD AUTO: 0.58 K/UL — SIGNIFICANT CHANGE UP (ref 0.1–0.6)
MONOCYTES NFR BLD AUTO: 8.5 % — SIGNIFICANT CHANGE UP (ref 1.7–9.3)
NEUTROPHILS # BLD AUTO: 4.6 K/UL — SIGNIFICANT CHANGE UP (ref 1.4–6.5)
NEUTROPHILS NFR BLD AUTO: 67.6 % — SIGNIFICANT CHANGE UP (ref 42.2–75.2)
NRBC # BLD: 0 /100 WBCS — SIGNIFICANT CHANGE UP (ref 0–0)
PLATELET # BLD AUTO: 225 K/UL — SIGNIFICANT CHANGE UP (ref 130–400)
PMV BLD: 10.4 FL — SIGNIFICANT CHANGE UP (ref 7.4–10.4)
POTASSIUM SERPL-MCNC: 4.1 MMOL/L — SIGNIFICANT CHANGE UP (ref 3.5–5)
POTASSIUM SERPL-SCNC: 4.1 MMOL/L — SIGNIFICANT CHANGE UP (ref 3.5–5)
RBC # BLD: 3.1 M/UL — LOW (ref 4.7–6.1)
RBC # FLD: 12 % — SIGNIFICANT CHANGE UP (ref 11.5–14.5)
SODIUM SERPL-SCNC: 140 MMOL/L — SIGNIFICANT CHANGE UP (ref 135–146)
WBC # BLD: 6.81 K/UL — SIGNIFICANT CHANGE UP (ref 4.8–10.8)
WBC # FLD AUTO: 6.81 K/UL — SIGNIFICANT CHANGE UP (ref 4.8–10.8)

## 2024-10-08 PROCEDURE — 93016 CV STRESS TEST SUPVJ ONLY: CPT

## 2024-10-08 PROCEDURE — 93010 ELECTROCARDIOGRAM REPORT: CPT

## 2024-10-08 PROCEDURE — 99232 SBSQ HOSP IP/OBS MODERATE 35: CPT | Mod: 25

## 2024-10-08 PROCEDURE — 93018 CV STRESS TEST I&R ONLY: CPT

## 2024-10-08 PROCEDURE — 99233 SBSQ HOSP IP/OBS HIGH 50: CPT

## 2024-10-08 PROCEDURE — 78452 HT MUSCLE IMAGE SPECT MULT: CPT | Mod: 26

## 2024-10-08 RX ORDER — REGADENOSON 0.08 MG/ML
0.4 INJECTION, SOLUTION INTRAVENOUS ONCE
Refills: 0 | Status: DISCONTINUED | OUTPATIENT
Start: 2024-10-08 | End: 2024-10-10

## 2024-10-08 RX ORDER — MAGNESIUM SULFATE 500 MG/ML
2 VIAL (ML) INJECTION ONCE
Refills: 0 | Status: COMPLETED | OUTPATIENT
Start: 2024-10-08 | End: 2024-10-08

## 2024-10-08 RX ADMIN — Medication 5000 UNIT(S): at 21:41

## 2024-10-08 RX ADMIN — Medication 1: at 16:58

## 2024-10-08 RX ADMIN — Medication 5000 UNIT(S): at 05:25

## 2024-10-08 RX ADMIN — Medication 5000 UNIT(S): at 16:21

## 2024-10-08 RX ADMIN — Medication 81 MILLIGRAM(S): at 13:17

## 2024-10-08 RX ADMIN — Medication 3 UNIT(S): at 12:04

## 2024-10-08 RX ADMIN — Medication 75 MILLILITER(S): at 07:25

## 2024-10-08 RX ADMIN — Medication 100 MILLIGRAM(S): at 05:25

## 2024-10-08 RX ADMIN — ATORVASTATIN CALCIUM 20 MILLIGRAM(S): 10 TABLET, FILM COATED ORAL at 21:41

## 2024-10-08 RX ADMIN — Medication 3 UNIT(S): at 16:57

## 2024-10-08 RX ADMIN — Medication 25 GRAM(S): at 13:18

## 2024-10-08 RX ADMIN — Medication 5: at 12:05

## 2024-10-08 RX ADMIN — Medication 10 MILLIGRAM(S): at 05:26

## 2024-10-08 RX ADMIN — INSULIN GLARGINE 9 UNIT(S): 300 INJECTION, SOLUTION SUBCUTANEOUS at 21:43

## 2024-10-08 NOTE — CHART NOTE - NSCHARTNOTEFT_GEN_A_CORE
Telemetry Transfer Note  10/8/2024 3:50pm  Transfer from: Telemetry   Transfer to:  Cardiac Unit 4T  Accepting physician: Dr Kamilah Butler      HPI:   84 years old male patient with pmh of HLD, DM, HTN brought to the ed for fall. Hx taken from the patient and is aox3 at time of the interview.  Patient states that he was going down to his basement, and when he got there the next thing he remembers is lying himself on the floor on his back. He does not know wether he lost consciousness or not but thinks that he lost consciousness as he does not know what actually happened and how he found himself on the floor. On further inquiry, patient states that he was trying to get up from the floor then he felt that his left shoulder is weak, painful and stiff. on trying to get up from the ground, he state that he hit his head against the ground .  He was finally able to gain his strength back and get up, he went up to his room and slept. His sons called his PCP who advised him to go to the ER. Further review of the systems is negative for any headache, light headiness dizziness palpitations, chest pain, nausea, vomit, diarrhea, dysuria , frequency, urgency, extremity weakness and other significant complaints.     VITALS:   T(C): 36.4 (10-05-24 @ 00:31), Max: 36.8 (10-04-24 @ 17:37)  HR: 57 (10-05-24 @ 00:31) (57 - 76)  BP: 134/74 (10-05-24 @ 00:31) (119/70 - 154/67)  RR: 18 (10-05-24 @ 00:31) (18 - 18)  SpO2: 96% (10-05-24 @ 00:31) (96% - 97%)    On presentation ekg showed TWI in inferolateral leads, not seen on ecg done in 2022 troponin was 370       Telemetry course: Patient admitted to telemetry to undergo Syncope workup and rule out CAD, he did not endorse any chest pain during his stay, as part of his cardiac workup troponin was trended 370->344->280, pro-bnp 1655, cardiology were consulted and patient was started on aspirin, echocardiogram ordered showed . Low-normal global left ventricular systolic. Left ventricular ejection fraction, by visual estimation, is 50 to 55% and moderate (Grade 2) diastolic dysfunction. Left heart cath was deferred till kidney function improved as patient presented with ARIELLE likely due to rhabdomyolysis and possible UTI, He received IV fluids, and was started on antibiotics, subsequently his kidney function improvement Cr: 2.6 -> 2.4 -> 1.4 -> 1.2, his creatine kinase was trending down with IV fluids, patient was put back on his Ramipril. no Urinary symptoms, blood culture came back negative, Electrophysiology consulted recommended Orthostatic measurements which were negative TSH wnl. Neurology were consulted for seizure evaluation mri head done did not show any acute findings and EEG was normal. After patient improvement kidney and infection wise a decision was made to continue ischemic workup by doing a stress test pm 10/8, stress test showed No evidence for ischemia during Lexiscan infusion as described above. Fixed Defect in the inferior wall the left ventricle with incomplete thickening suggesting prior injury. decision was made to transfer patient to cardiac monitoring unit to continue ischemic work up by performing heart catheterization       ASSESSMENT AND PLAN:   #Syncope   -#unclear etiology ruled out neurological cause, infectious  #possible CAD (new ekg changes inferolateral TWI, elevated troponin)  -sudden LOC suggests cardiogenic cause  - EKG: EKG with TWIs in inferolateral leads   - Trops--  370--> 344--> 280   - Cardio: type II NSTEMI given infection vs ischemia    -TTE Left ventricular ejection fraction, by visual estimation, is 50 to 55% and moderate (Grade 2) diastolic dysfunction.  EP consult  - EP: orthostatics negative, TSH within normal limits may benefit from ILR if above work up is negative  - Cardiac workup : stress test -> defect in left ventricle with possible scar needs further workup through cath to rule out vessel disease,   -If no ischemia consider cardiac mri to rule out infiltrative disease       Left shoulder pain and stiffness  Transient LEFT arm weakness    - MRI brain negative  - case d/w neuro attending-unlikely TIA, more likely nerve injury from fall  - no need for CTA as per neurology     ARIELLE RESOLVED   - likely from rhabdo CK trending down   - SCr improving  - CK continues to downtrend   - c/w IVF for now    Pyuria  - has had intermittent dysuria  - c/w rocephin  - follow up cultures So far negative        Transaminitis  - ggt normal  - RUQ sono normal  - suggests MSK cause of elevatation especially since CK elevated  - can resume Lipitior      HTN  - hold home ramipril due to arielle ->  Ramipril resumed after ARIELLE resolved       DM  - hold home oral meds  - target blood sugar 140 180  - start insulin if needed     HLD  - resume Lipitor        For Follow-Up:    Continue monitoring Creatine Kinase   Possible left heart cath          Vital Signs Last 24 Hrs  T(C): 36.4 (08 Oct 2024 12:44), Max: 36.5 (08 Oct 2024 05:11)  T(F): 97.6 (08 Oct 2024 12:44), Max: 97.7 (08 Oct 2024 05:11)  HR: 94 (08 Oct 2024 12:44) (59 - 94)  BP: 143/74 (08 Oct 2024 12:44) (143/74 - 153/75)  BP(mean): --  RR: 18 (08 Oct 2024 12:44) (18 - 18)  SpO2: 95% (08 Oct 2024 09:35) (95% - 95%)    Parameters below as of 08 Oct 2024 09:35  Patient On (Oxygen Delivery Method): room air      I&O's Summary    07 Oct 2024 07:01  -  08 Oct 2024 07:00  --------------------------------------------------------  IN: 1416 mL / OUT: 725 mL / NET: 691 mL    08 Oct 2024 07:01  -  08 Oct 2024 15:48  --------------------------------------------------------  IN: 0 mL / OUT: 300 mL / NET: -300 mL          MEDICATIONS  (STANDING):  aspirin  chewable 81 milliGRAM(s) Oral daily  atorvastatin 20 milliGRAM(s) Oral at bedtime  cefTRIAXone   IVPB 1000 milliGRAM(s) IV Intermittent every 24 hours  dextrose 5%. 1000 milliLiter(s) (50 mL/Hr) IV Continuous <Continuous>  dextrose 5%. 1000 milliLiter(s) (100 mL/Hr) IV Continuous <Continuous>  dextrose 50% Injectable 12.5 Gram(s) IV Push once  dextrose 50% Injectable 25 Gram(s) IV Push once  dextrose 50% Injectable 25 Gram(s) IV Push once  glucagon  Injectable 1 milliGRAM(s) IntraMuscular once  heparin   Injectable 5000 Unit(s) SubCutaneous every 8 hours  insulin glargine Injectable (LANTUS) 9 Unit(s) SubCutaneous at bedtime  insulin lispro (ADMELOG) corrective regimen sliding scale   SubCutaneous three times a day before meals  insulin lispro Injectable (ADMELOG) 3 Unit(s) SubCutaneous three times a day before meals  lactated ringers. 1000 milliLiter(s) (75 mL/Hr) IV Continuous <Continuous>  lisinopril 10 milliGRAM(s) Oral daily  regadenoson Injectable 0.4 milliGRAM(s) IV Push once    MEDICATIONS  (PRN):  dextrose Oral Gel 15 Gram(s) Oral once PRN Blood Glucose LESS THAN 70 milliGRAM(s)/deciliter        LABS                                            9.8                   Neurophils% (auto):   67.6   (10-08 @ 06:27):    6.81 )-----------(225          Lymphocytes% (auto):  19.7                                          30.1                   Eosinphils% (auto):   2.9      Manual%: Neutrophils x    ; Lymphocytes x    ; Eosinophils x    ; Bands%: x    ; Blasts x                                    140    |  107    |  22                  Calcium: 8.3   / iCa: x      (10-08 @ 06:27)    ----------------------------<  162       Magnesium: 1.7                              4.1     |  22     |  1.2              Phosphorous: x

## 2024-10-08 NOTE — PROGRESS NOTE ADULT - ASSESSMENT
Syncope in the setting of a UTI  No cardiac history    Feels well.  No complaints.    EKG:  SB with inferolateral ischemia  ECHO reviewed  Lexiscan NST:  Inferior scar, LVEF 45%      - Transfer to [address] [place] [address] tomorrow 10/9  - NPO after midnight  - EP follow-up for ILR  - TFTs

## 2024-10-08 NOTE — PROGRESS NOTE ADULT - ATTENDING COMMENTS
83 yo M PMHx HLD, DM II, HTN brought to the ed for fall and suspected syncope. Reports that he was about to walk upstairs from his basement. He was on the first step going up. He reports he doesn't recall anything else but work up on the floor. He reports his pants were wet but reports that he thinks his son just mopped the floor. When he tried to get up his left arm was weak. He could lift up his left arm but he could not keep it up. He denies any dizziness, HA, etc prior to event. After a while he reports he forced himself to get up and went about his day and felt well. Reports intermittent burning over past week with urination.    Syncope   - unclear etiology  - sudden LOC suggests cardiogenic cause  - EKG: EKG with TWIs in inferolateral leads   - Trops--  370--> 344--> 280   - Cardio: type II NSTEMI given sepsis, get TTE and EP consult  - EP: check serial orthostatic VS, Check TSH / Free T4 , may benefit from ILR if above work up is negative  - NST shows scarring. Pt needs cath tomorrow and then possible cardiac MRI      Left shoulder pain and stiffness  Transient LEFT arm weakness    - MRI brain negative  - case d/w neuro attending-unlikely TIA, more likely nerve injury from fall  - no need for CTA    ARIELLE  - likely from rhabdo  - CK eleavted 6000 day after admission  - CK today is 1000  - c/w IVF for one more day, can likely dc in AM if CK stable    Pyuria  - has had intermittent dysuria  - c/w rocephin  - follow up cultures       Transaminitis  - ggt normal  - RUQ sono normal  - suggests MSK cause of elevation especially since CK elevated  - can resume Lipitior      HTN  - can restart ACE-I    DM  - hold home oral meds  - target blood sugar 140 180  - start insulin if needed     HLD  - resume Lipitor    Gout?  - hold allopurinol due to arielle     DVT px    #Progress Note Handoff:  Pending (specify): cath, possible cardiac MRI  Family discussion: As above with patient and son  Disposition: Home__x_/SNF___/Other________/Unknown at this time________

## 2024-10-08 NOTE — PROGRESS NOTE ADULT - SUBJECTIVE AND OBJECTIVE BOX
Stable overnight      MEDICATIONS  (STANDING):  aspirin  chewable 81 milliGRAM(s) Oral daily  atorvastatin 20 milliGRAM(s) Oral at bedtime  cefTRIAXone   IVPB 1000 milliGRAM(s) IV Intermittent every 24 hours  dextrose 5%. 1000 milliLiter(s) (50 mL/Hr) IV Continuous <Continuous>  dextrose 5%. 1000 milliLiter(s) (100 mL/Hr) IV Continuous <Continuous>  dextrose 50% Injectable 25 Gram(s) IV Push once  dextrose 50% Injectable 12.5 Gram(s) IV Push once  dextrose 50% Injectable 25 Gram(s) IV Push once  glucagon  Injectable 1 milliGRAM(s) IntraMuscular once  heparin   Injectable 5000 Unit(s) SubCutaneous every 8 hours  insulin glargine Injectable (LANTUS) 9 Unit(s) SubCutaneous at bedtime  insulin lispro (ADMELOG) corrective regimen sliding scale   SubCutaneous three times a day before meals  insulin lispro Injectable (ADMELOG) 3 Unit(s) SubCutaneous three times a day before meals  lactated ringers. 1000 milliLiter(s) (75 mL/Hr) IV Continuous <Continuous>  lisinopril 10 milliGRAM(s) Oral daily  regadenoson Injectable 0.4 milliGRAM(s) IV Push once        PHYSICAL EXAM:  Vital Signs Last 24 Hrs  T(C): 36.4 (08 Oct 2024 12:44), Max: 36.5 (08 Oct 2024 05:11)  T(F): 97.6 (08 Oct 2024 12:44), Max: 97.7 (08 Oct 2024 05:11)  HR: 94 (08 Oct 2024 12:44) (59 - 94)  BP: 143/74 (08 Oct 2024 12:44) (143/74 - 153/75)  BP(mean): --  RR: 18 (08 Oct 2024 12:44) (18 - 18)  SpO2: 95% (08 Oct 2024 09:35) (95% - 95%)    Parameters below as of 08 Oct 2024 09:35  Patient On (Oxygen Delivery Method): room air        General: NAD, AAOx3  HEENT: NCAT, EOMI  Neck: supple, no JVD  CV: RRR, S1S2 nl, no murmurs   Lungs: CTA bilaterally, no wheeze, rales or rhonchi  Abdomen: soft, NT/ND, +BS  Extremities: ROM nl, no clubbing, cyanosis or edema  Neurologic: Nonfocal                            9.8    6.81  )-----------( 225      ( 08 Oct 2024 06:27 )             30.1         10-08    140  |  107  |  22[H]  ----------------------------<  162[H]  4.1   |  22  |  1.2    Ca    8.3[L]      08 Oct 2024 06:27  Phos  3.8     10-07  Mg     1.7     10-08    TPro  5.2[L]  /  Alb  3.1[L]  /  TBili  0.4  /  DBili  x   /  AST  105[H]  /  ALT  66[H]  /  AlkPhos  86  10-07        < from: TTE Echo Complete w/o Contrast w/ Doppler (10.05.24 @ 12:34) >  Summary:   1. Low-normal global left ventricular systolicfunction. Left   ventricular ejection fraction, by visual estimation, is 50 to 55%.   Moderate (Grade 2) diastolic dysfunction. No regional wall motion   abnormalities. Increased LV wall thickness (IVSd 1.4cm) with normal mass   index.   2. Normal right ventricular size and function.   3. Mildly enlarged left atrium.   4. No hemodynamically significant valvular disease.   5. Adequate TR velocity was not obtained to accurately assess RVSP.   6. There is no evidence of pericardial effusion.    < end of copied text >        < from: NM Nuclear Stress Pharmacologic Multiple (10.08.24 @ 10:15) >  Impression:  1.   No evidence for ischemia during Lexiscan infusion as described above.  2.  Fixed Defect in the inferior wall the left ventricle with incomplete   thickening suggesting prior injury (scar).  3.  Mild global hypokinesis. Incomplete thickening of the inferior wall   the left ventricle suggesting prior injury (scar). All other walls of the   left ventricle thicken.  4.  Left ventricular ejection fraction calculated as 47% which is low.   Echocardiographic ejection fraction by visual estimation was 50-55% on   10/05/2024. Wall motion analysis suggests ejection fraction of 45%    < end of copied text >

## 2024-10-08 NOTE — PROGRESS NOTE ADULT - SUBJECTIVE AND OBJECTIVE BOX
SUBJECTIVE/OVERNIGHT EVENTS  Today is hospital day 4d. This morning patient was seen and examined at bedside, resting comfortably in bed. No acute or major events overnight.    HOSPITAL COURSE  Day 1:   Day 2:   Day 3:     CODE STATUS:    FAMILY COMMUNICATION  Contact date:  Name of person contacted:  Relationship to patient:  Communication details:    MEDICATIONS  STANDING MEDICATIONS  aspirin  chewable 81 milliGRAM(s) Oral daily  atorvastatin 20 milliGRAM(s) Oral at bedtime  cefTRIAXone   IVPB 1000 milliGRAM(s) IV Intermittent every 24 hours  dextrose 5%. 1000 milliLiter(s) IV Continuous <Continuous>  dextrose 5%. 1000 milliLiter(s) IV Continuous <Continuous>  dextrose 50% Injectable 25 Gram(s) IV Push once  dextrose 50% Injectable 25 Gram(s) IV Push once  dextrose 50% Injectable 12.5 Gram(s) IV Push once  glucagon  Injectable 1 milliGRAM(s) IntraMuscular once  heparin   Injectable 5000 Unit(s) SubCutaneous every 8 hours  insulin glargine Injectable (LANTUS) 9 Unit(s) SubCutaneous at bedtime  insulin lispro (ADMELOG) corrective regimen sliding scale   SubCutaneous three times a day before meals  insulin lispro Injectable (ADMELOG) 3 Unit(s) SubCutaneous three times a day before meals  lactated ringers. 1000 milliLiter(s) IV Continuous <Continuous>  lisinopril 10 milliGRAM(s) Oral daily  regadenoson Injectable 0.4 milliGRAM(s) IV Push once    PRN MEDICATIONS  dextrose Oral Gel 15 Gram(s) Oral once PRN    VITALS  T(F): 97.6 (10-08-24 @ 12:44), Max: 97.7 (10-08-24 @ 05:11)  HR: 94 (10-08-24 @ 12:44) (59 - 94)  BP: 143/74 (10-08-24 @ 12:44) (143/74 - 153/75)  RR: 18 (10-08-24 @ 12:44) (18 - 18)  SpO2: 95% (10-08-24 @ 09:35) (95% - 96%)  POCT Blood Glucose.: 357 mg/dL (10-08-24 @ 11:47)  POCT Blood Glucose.: 172 mg/dL (10-08-24 @ 07:27)  POCT Blood Glucose.: 170 mg/dL (10-07-24 @ 21:02)  POCT Blood Glucose.: 133 mg/dL (10-07-24 @ 17:03)    PHYSICAL EXAM  GENERAL  ( X ) NAD, lying in bed comfortably     (  ) obtunded     (  ) lethargic     (  ) somnolent    HEAD  ( X ) Atraumatic     (  ) hematoma     (  ) laceration (specify location:       )     NECK  (X  ) Supple     (  ) neck stiffness     (  ) nuchal rigidity     (  )  no JVD     (  ) JVD present ( -- cm)    HEART  Rate -->  ( X ) normal rate    (  ) bradycardic    (  ) tachycardic  Rhythm -->  ( X ) regular    (  ) regularly irregular    (  ) irregularly irregular  Murmurs -->  ( X ) normal s1/s2    (  ) systolic murmur    (  ) diastolic murmur    (  ) continuous murmur     (  ) S3 present    (  ) S4 present    LUNGS  ( X )Unlabored respirations     (  ) tachypnea  ( X ) B/L air entry     (  ) decreased breath sounds in:  (location     )    ( X ) no adventitious sound     (  ) crackles     (  ) wheezing      (  ) rhonchi      (specify location:       )  (  ) chest wall tenderness (specify location:       )    ABDOMEN  (  X) Soft     (  ) tense   |   (  ) nondistended     (  ) distended   |   ( X ) +BS     (  ) hypoactive bowel sounds     (  ) hyperactive bowel sounds  (X  ) nontender     (  ) RUQ tenderness     (  ) RLQ tenderness     (  ) LLQ tenderness     (  ) epigastric tenderness     (  ) diffuse tenderness  (  ) Splenomegaly      (  ) Hepatomegaly      (  ) Jaundice     (  ) ecchymosis     EXTREMITIES  (X  ) Normal     (  ) Rash     (  ) ecchymosis     (  ) varicose veins      (  ) pitting edema     (  ) non-pitting edema   (  ) ulceration     (  ) gangrene:     (location:     )    NERVOUS SYSTEM  ( X ) A&Ox3     (  ) confused     (  ) lethargic  CN II-XII:     (  ) Intact     (  ) focal deficits  (Specify:     )   Upper extremities:     (  ) strength X/5     (  ) focal deficit (specify:    )  Lower extremities:     (  ) strength  X/5    (  ) focal deficit (specify:    )      LABS             9.8    6.81  )-----------( 225      ( 10-08-24 @ 06:27 )             30.1     140  |  107  |  22  -------------------------<  162   10-08-24 @ 06:27  4.1  |  22  |  1.2    Ca      8.3     10-08-24 @ 06:27  Phos   3.8     10-07-24 @ 06:06  Mg     1.7     10-08-24 @ 06:27    TPro  5.2  /  Alb  3.1  /  TBili  0.4  /  DBili  x   /  AST  105  /  ALT  66  /  AlkPhos  86  /  GGT  x     10-07-24 @ 06:06        Urinalysis Basic - ( 08 Oct 2024 06:27 )    Color: x / Appearance: x / SG: x / pH: x  Gluc: 162 mg/dL / Ketone: x  / Bili: x / Urobili: x   Blood: x / Protein: x / Nitrite: x   Leuk Esterase: x / RBC: x / WBC x   Sq Epi: x / Non Sq Epi: x / Bacteria: x          IMAGING

## 2024-10-08 NOTE — PROGRESS NOTE ADULT - ASSESSMENT
84 years old male patient with pmh of HLD, DM, HTN brought to the ed for fall and suspected syncope.    #Syncope   #Elevated troponin likely type 2 NSTEMI   - EKG: EKG with TWIs in inferolateral leads   - Trops--  370--> 344--> 280  CK elevated could cause some troponin elevation    - Cardio: type II NSTEMI given sepsis, Sepsis resolved cardio reconsulted for possible nuclear stress test vs cath  - EP: check serial orthostatic VS, Check TSH / Free T4 , may benefit from ILR if above work up is negative  -Stress test : Fixed Defect in the inferior wall the left ventricle with incomplete   thickening suggesting prior injury (scar). Awaiting further cardiology recommendations   CK trending down 10/8    #Fall  #Left shoulder pain and stiffness  #Transient right arm weakness    - trauma workup negative  - had transient right arm weakness that resolved. Neurology consulted: MR brain without contrast no acute pathology eeg no seizure, no need for CTA      #UTI   #ARIELLE/ATN  - sirs and sepsis absent on admission   - wbc 20k  - urine analysis + Wbc, leukocytes, casts, rbc, bacterias  - ct abdomen:  Findings compatible with urinary bladder infection/cystitis; correlation with urinalysis suggested.  - creat 2.6 ( baseline 1.2) bun 45   - iv fluids. s/p 2700 ml in ed. LR 75 ml/hr    - avoid nephrotoxic meds  - iv ceftriaxone   - fu blood and urine cultures negative so far  - US kub rule out hydronephrosis and CKD No hydronephrosis     #Transaminitis  Could be med induced   - , ALT 73  - hepatitis panel negative   - US ruq unremarkable   - Hold home Lipitor, Ezetimibe   - avoid hepatoxic meds       #HTN  - hold home ramipril due to arielle  - nifedipine prn after checking orthostatics    #DM  - hold home oral meds  - target blood sugar 140 180  - start insulin if needed     #HLD  - hold home Lipitor,  Ezetimibe   - restart once lfts stable     #Gout?  - hold allopurinol due to arielle     Pending: Cardio and EP follow up,   DVT PPX: heparin   GI PPX: NONE   DIET: DASH  CODE STATUS: FULL

## 2024-10-09 ENCOUNTER — TRANSCRIPTION ENCOUNTER (OUTPATIENT)
Age: 84
End: 2024-10-09

## 2024-10-09 LAB
ANION GAP SERPL CALC-SCNC: 8 MMOL/L — SIGNIFICANT CHANGE UP (ref 7–14)
BASOPHILS # BLD AUTO: 0.04 K/UL — SIGNIFICANT CHANGE UP (ref 0–0.2)
BASOPHILS NFR BLD AUTO: 0.6 % — SIGNIFICANT CHANGE UP (ref 0–1)
BUN SERPL-MCNC: 19 MG/DL — SIGNIFICANT CHANGE UP (ref 10–20)
CALCIUM SERPL-MCNC: 8.5 MG/DL — SIGNIFICANT CHANGE UP (ref 8.4–10.4)
CHLORIDE SERPL-SCNC: 106 MMOL/L — SIGNIFICANT CHANGE UP (ref 98–110)
CO2 SERPL-SCNC: 25 MMOL/L — SIGNIFICANT CHANGE UP (ref 17–32)
CREAT SERPL-MCNC: 1.1 MG/DL — SIGNIFICANT CHANGE UP (ref 0.7–1.5)
EGFR: 67 ML/MIN/1.73M2 — SIGNIFICANT CHANGE UP
EOSINOPHIL # BLD AUTO: 0.28 K/UL — SIGNIFICANT CHANGE UP (ref 0–0.7)
EOSINOPHIL NFR BLD AUTO: 4 % — SIGNIFICANT CHANGE UP (ref 0–8)
GLUCOSE BLDC GLUCOMTR-MCNC: 123 MG/DL — HIGH (ref 70–99)
GLUCOSE BLDC GLUCOMTR-MCNC: 144 MG/DL — HIGH (ref 70–99)
GLUCOSE BLDC GLUCOMTR-MCNC: 166 MG/DL — HIGH (ref 70–99)
GLUCOSE BLDC GLUCOMTR-MCNC: 168 MG/DL — HIGH (ref 70–99)
GLUCOSE SERPL-MCNC: 132 MG/DL — HIGH (ref 70–99)
HCT VFR BLD CALC: 32.1 % — LOW (ref 42–52)
HGB BLD-MCNC: 10.6 G/DL — LOW (ref 14–18)
IMM GRANULOCYTES NFR BLD AUTO: 1 % — HIGH (ref 0.1–0.3)
LYMPHOCYTES # BLD AUTO: 1.54 K/UL — SIGNIFICANT CHANGE UP (ref 1.2–3.4)
LYMPHOCYTES # BLD AUTO: 22 % — SIGNIFICANT CHANGE UP (ref 20.5–51.1)
MAGNESIUM SERPL-MCNC: 1.9 MG/DL — SIGNIFICANT CHANGE UP (ref 1.8–2.4)
MCHC RBC-ENTMCNC: 31.7 PG — HIGH (ref 27–31)
MCHC RBC-ENTMCNC: 33 G/DL — SIGNIFICANT CHANGE UP (ref 32–37)
MCV RBC AUTO: 96.1 FL — HIGH (ref 80–94)
MONOCYTES # BLD AUTO: 0.62 K/UL — HIGH (ref 0.1–0.6)
MONOCYTES NFR BLD AUTO: 8.9 % — SIGNIFICANT CHANGE UP (ref 1.7–9.3)
NEUTROPHILS # BLD AUTO: 4.45 K/UL — SIGNIFICANT CHANGE UP (ref 1.4–6.5)
NEUTROPHILS NFR BLD AUTO: 63.5 % — SIGNIFICANT CHANGE UP (ref 42.2–75.2)
NRBC # BLD: 0 /100 WBCS — SIGNIFICANT CHANGE UP (ref 0–0)
PLATELET # BLD AUTO: 235 K/UL — SIGNIFICANT CHANGE UP (ref 130–400)
PMV BLD: 9.8 FL — SIGNIFICANT CHANGE UP (ref 7.4–10.4)
POTASSIUM SERPL-MCNC: 4.3 MMOL/L — SIGNIFICANT CHANGE UP (ref 3.5–5)
POTASSIUM SERPL-SCNC: 4.3 MMOL/L — SIGNIFICANT CHANGE UP (ref 3.5–5)
RBC # BLD: 3.34 M/UL — LOW (ref 4.7–6.1)
RBC # FLD: 12 % — SIGNIFICANT CHANGE UP (ref 11.5–14.5)
SODIUM SERPL-SCNC: 139 MMOL/L — SIGNIFICANT CHANGE UP (ref 135–146)
WBC # BLD: 7 K/UL — SIGNIFICANT CHANGE UP (ref 4.8–10.8)
WBC # FLD AUTO: 7 K/UL — SIGNIFICANT CHANGE UP (ref 4.8–10.8)

## 2024-10-09 PROCEDURE — 99222 1ST HOSP IP/OBS MODERATE 55: CPT

## 2024-10-09 PROCEDURE — 93458 L HRT ARTERY/VENTRICLE ANGIO: CPT | Mod: 26

## 2024-10-09 PROCEDURE — 99233 SBSQ HOSP IP/OBS HIGH 50: CPT

## 2024-10-09 RX ORDER — SODIUM CHLORIDE 0.9 % (FLUSH) 0.9 %
1000 SYRINGE (ML) INJECTION
Refills: 0 | Status: DISCONTINUED | OUTPATIENT
Start: 2024-10-09 | End: 2024-10-12

## 2024-10-09 RX ORDER — MAGNESIUM SULFATE 500 MG/ML
1 VIAL (ML) INJECTION ONCE
Refills: 0 | Status: COMPLETED | OUTPATIENT
Start: 2024-10-09 | End: 2024-10-09

## 2024-10-09 RX ORDER — AMLODIPINE BESYLATE 5 MG
2.5 TABLET ORAL ONCE
Refills: 0 | Status: COMPLETED | OUTPATIENT
Start: 2024-10-09 | End: 2024-10-09

## 2024-10-09 RX ORDER — SODIUM CHLORIDE 0.9 % (FLUSH) 0.9 %
250 SYRINGE (ML) INJECTION ONCE
Refills: 0 | Status: COMPLETED | OUTPATIENT
Start: 2024-10-09 | End: 2024-10-09

## 2024-10-09 RX ORDER — ISOSORBIDE MONONITRATE 30 MG
15 TABLET, EXTENDED RELEASE 24 HR ORAL ONCE
Refills: 0 | Status: DISCONTINUED | OUTPATIENT
Start: 2024-10-09 | End: 2024-10-10

## 2024-10-09 RX ADMIN — ATORVASTATIN CALCIUM 20 MILLIGRAM(S): 10 TABLET, FILM COATED ORAL at 21:28

## 2024-10-09 RX ADMIN — Medication 100 MILLIGRAM(S): at 05:48

## 2024-10-09 RX ADMIN — Medication 2.5 MILLIGRAM(S): at 18:32

## 2024-10-09 RX ADMIN — Medication 150 MILLILITER(S): at 20:15

## 2024-10-09 RX ADMIN — Medication 5000 UNIT(S): at 05:48

## 2024-10-09 RX ADMIN — Medication 500 MILLILITER(S): at 17:11

## 2024-10-09 RX ADMIN — Medication 81 MILLIGRAM(S): at 12:32

## 2024-10-09 RX ADMIN — Medication 100 GRAM(S): at 12:37

## 2024-10-09 RX ADMIN — Medication 10 MILLIGRAM(S): at 05:47

## 2024-10-09 RX ADMIN — INSULIN GLARGINE 9 UNIT(S): 300 INJECTION, SOLUTION SUBCUTANEOUS at 21:28

## 2024-10-09 NOTE — PROGRESS NOTE ADULT - ASSESSMENT
Assessment: 83 yo M with PMHx of HTN, HLD, DM, brought to ED for fall with possible syncope. In the ED patients ECG showed new TWI in inferolateral leads, elevated troponin (370> 344 > 280), elevated proBNP 1655, Creatine Kinase >6000, ARIELLE on admission Cr 2.6. LHC deferred 2/2 ARIELLE/ Rhabdo, syncope wkup negative, Tala ST showed Fixed Defect in the inferior wall the left ventricle with incomplete thickening suggesting prior injury. decision was made to transfer patient to [facility] for LHC.     Problems discussed and associated plan:  #Syncope, unclear etiology ruled out neurological cause, infectious causes; possible CAD (new ekg changes inferolateral TWI, elevated troponin)  - EKG: EKG with TWIs in inferolateral leads   - Trops--  370--> 344--> 280   - Cardio: type II NSTEMI given infection vs ischemia    -TTE Left ventricular ejection fraction, by visual estimation, is 50 to 55% and mod G2DD.  - EP: orthostatics negative, TSH within normal limits may benefit from ILR if above work up is negative  -10/8 NST defect in left ventricle with possible scar   - NPO, plan for LHC today for ischemic eval   -If no ischemia consider cardiac mri to rule out infiltrative disease     Left shoulder pain and stiffness  Transient LEFT arm weakness    - MRI brain negative  - case d/w neuro attending-unlikely TIA, more likely nerve injury from fall  - no need for CTA as per neurology     ARIELLE on admission   - likely from rhabdo CK trending down   - SCr improving  - CK continues to downtrend     Pyuria  - s/p course of abx   - cultures negative      Transaminitis  - ggt normal  - RUQ sono normal  - suggests MSK cause of elevatation especially since CK elevated  - can resume Lipitior    HTN  - hold home ramipril due to arielle ->  Ramipril resumed after ARIELLE resolved     DM  - hold home oral meds  - target blood sugar 140 180  - start insulin if needed     HLD  - resume Lipitor      Please contact me with any questions or concerns at [phone]. Assessment: 83 yo M with PMHx of HTN, HLD, DM, brought to ED for fall with possible syncope. In the ED patients ECG showed new TWI in inferolateral leads, elevated troponin (370> 344 > 280), elevated proBNP 1655, Creatine Kinase >6000, ARIELLE on admission Cr 2.6. LHC deferred 2/2 ARIELLE/ Rhabdo, syncope wkup negative, Tala ST showed Fixed Defect in the inferior wall the left ventricle with incomplete thickening suggesting prior injury. decision was made to transfer patient to [facility] for LHC.     Problems discussed and associated plan:  #Syncope, unclear etiology ruled out neurological cause, infectious causes; possible CAD (new ekg changes inferolateral TWI, elevated troponin)  - EKG: EKG with TWIs in inferolateral leads   - Trops--  370--> 344--> 280   - Cardio: type II NSTEMI given infection vs ischemia    -TTE Left ventricular ejection fraction, by visual estimation, is 50 to 55% and mod G2DD.  - EP: orthostatics negative, TSH within normal limits may benefit from ILR if above work up is negative  -10/8 NST defect in left ventricle with possible scar   - NPO, plan for LHC today for ischemic eval   - If no ischemia consider cardiac mri to rule out infiltrative disease   - will need ILR by EP prior to dc     Left shoulder pain and stiffness  Transient LEFT arm weakness    - MRI brain negative  - case d/w neuro attending-unlikely TIA, more likely nerve injury from fall  - no need for CTA as per neurology     #HTN   -increase Lisinopril to 20mg for better BP control   -can resume ace now that creatinine improving     #ARIELLE on admission   - likely from rhabdo CK trending down   - SCr improving  - CK downtrended     #Pyuria  - s/p course of abx   - cultures negative      #Transaminitis  - ggt normal  - RUQ sono normal  - suggests MSK cause of elevation especially since CK elevated  - can resume Lipitior    DM  - hold home oral meds  - target blood sugar 140 180  - start insulin if needed     HLD  - resume Lipitor    Please contact me with any questions or concerns at [phone]. Assessment: 83 yo M with PMHx of HTN, HLD, DM, brought to ED for fall with possible syncope. In the ED patients ECG showed new TWI in inferolateral leads, elevated troponin (370> 344 > 280), elevated proBNP 1655, Creatine Kinase >6000, ARIELLE on admission Cr 2.6. LHC deferred 2/2 ARIELLE/ Rhabdo, syncope wkup negative, Tala ST showed Fixed Defect in the inferior wall the left ventricle with incomplete thickening suggesting prior injury. decision was made to transfer patient to [facility] for LHC.     Problems discussed and associated plan:  #Syncope, unclear etiology ruled out neurological cause, infectious causes; possible CAD (new ekg changes inferolateral TWI, elevated troponin)  - EKG: EKG with TWIs in inferolateral leads   - Trops--  370--> 344--> 280   - Cardio: type II NSTEMI given infection vs ischemia    -TTE Left ventricular ejection fraction, by visual estimation, is 50 to 55% and mod G2DD.  - EP: orthostatics negative, TSH within normal limits may benefit from ILR if above work up is negative  -10/8 NST defect in left ventricle with possible scar   - NPO, plan for LHC today for ischemic eval   - If no ischemia consider cardiac mri to rule out infiltrative disease   - will need ILR by EP prior to dc     Left shoulder pain and stiffness  Transient LEFT arm weakness    - MRI brain negative  - case d/w neuro attending-unlikely TIA, more likely nerve injury from fall  - no need for CTA as per neurology     #HTN   -c/w Lisinopril 10mg for better BP control on ramipril at home   -can resume ace now that creatinine improving     #ARIELLE on admission   - likely from rhabdo CK trending down   - SCr improving  - CK downtrended     #Pyuria  - s/p course of abx   - cultures negative      #Transaminitis  - ggt normal  - RUQ sono normal  - suggests MSK cause of elevation especially since CK elevated  - can resume Lipitior    DM  - hold home oral meds  - target blood sugar 140 180  - start insulin if needed     HLD  - resume Lipitor    Please contact me with any questions or concerns at [phone].

## 2024-10-09 NOTE — DISCHARGE NOTE PROVIDER - PROVIDER TOKENS
PROVIDER:[TOKEN:[[id number]]],PROVIDER:[TOKEN:[[id number]]],PROVIDER:[TOKEN:[[id number]]]

## 2024-10-09 NOTE — PROGRESS NOTE ADULT - SUBJECTIVE AND OBJECTIVE BOX
Chief complaint: Patient is a 84y old  Male who presents with a chief complaint of Fall (07 Oct 2024 11:54)    HPI: In brief, Adriano Grove is an 85 yo M with PMHx of HTN, HLD, DM, brought to ED for fall with possible syncope. In the ED patients ECG showed new TWI in inferolateral leads, elevated troponin (370> 344 > 280), elevated proBNP 1655, Creatine Kinase >6000, ARIELLE on admission Cr 2.6. Patient was admitted to [facility] for syncope wkup, cardiology consulted, started on ASA 81mg, TTE showed EF 50-55%, G2DD.  Left heart cath was deferred till kidney function improved likely 2/2 to rhabdomyolysis and possible UTI, He received IV fluids, and was started on antibiotics, subsequently his kidney function improved Cr: 2.6 -> 2.4 -> 1.4 -> 1.2. EP consulted recommended orthostatics which were negative & TSH wnl. Neurology was consulted for seizure evaluation- MRI head done did not show any acute findings and EEG was normal. After improvement in kidney function, 10/8, patent underwent lexiscan stress test which showed Fixed Defect in the inferior wall the left ventricle with incomplete thickening suggesting prior injury. decision was made to transfer patient to cardiac telemetry for continued management and ischemic work up with [facility].      Interval history:    Review of systems: A complete 10-point review of systems was obtained and is negative except as stated in the interval history.    Vitals:  T(F): 98.1, Max: 98.4 (10-08 @ 22:55)  HR: 58 (53 - 94)  BP: 152/71 (132/60 - 168/75)  RR: 15 (15 - 18)  SpO2: 96% (95% - 97%)    Ins & outs:     10-06 @ 07:01  -  10-07 @ 07:00  --------------------------------------------------------  IN: 1893 mL / OUT: 450 mL / NET: 1443 mL    10-07 @ 07:01  -  10-08 @ 07:00  --------------------------------------------------------  IN: 1416 mL / OUT: 725 mL / NET: 691 mL    10-08 @ 07:01  -  10-09 @ 07:00  --------------------------------------------------------  IN: 1165 mL / OUT: 650 mL / NET: 515 mL      Weight trend:  Weight (kg): 93.2 (10-04)    Physical exam:  General: No apparent distress  HEENT: Anicteric sclera. Moist mucous membranes.   Cardiac: Regular rate and rhythm. No murmurs, rubs, or gallops.   Vascular: Symmetric radial pulses. Dorsalis pedis pulses palpable.   Respiratory: Normal effort. Clear to ascultation.   Abdomen: Soft, nontender. Audible bowel sounds.   Extremities: Warm with no edema. No cyanosis or clubbing.   Skin: Warm and dry. No rash.   Neurologic: Grossly normal motor function.   Psychiatric: Oriented to person, place, and time.     Data reviewed:  - Telemetry:   - ECG (date***):   - TTE (date***):   - Chest x-ray (date***):   - Stress test:   - CCTA:  - Cardiac catheterization:  - Cardiac MRI:    - Labs:                        10.6   7.00  )-----------( 235      ( 09 Oct 2024 05:40 )             32.1     10-09    139  |  106  |  19  ----------------------------<  132[H]  4.3   |  25  |  1.1    Ca    8.5      09 Oct 2024 05:40  Mg     1.9     10-09      Triglycerides, Serum: 163 mg/dL (10-06-24 @ 05:46)  LDL Cholesterol Calculated: 54 mg/dL (10-06-24 @ 05:46)    Thyroid Stimulating Hormone, Serum: 3.72 uIU/mL (10-05-24 @ 10:54)    Urinalysis Basic - ( 09 Oct 2024 05:40 )    Color: x / Appearance: x / SG: x / pH: x  Gluc: 132 mg/dL / Ketone: x  / Bili: x / Urobili: x   Blood: x / Protein: x / Nitrite: x   Leuk Esterase: x / RBC: x / WBC x   Sq Epi: x / Non Sq Epi: x / Bacteria: x        Medications:  aspirin  chewable 81 milliGRAM(s) Oral daily  atorvastatin 20 milliGRAM(s) Oral at bedtime  dextrose 50% Injectable 25 Gram(s) IV Push once  dextrose 50% Injectable 25 Gram(s) IV Push once  dextrose 50% Injectable 12.5 Gram(s) IV Push once  glucagon  Injectable 1 milliGRAM(s) IntraMuscular once  heparin   Injectable 5000 Unit(s) SubCutaneous every 8 hours  insulin glargine Injectable (LANTUS) 9 Unit(s) SubCutaneous at bedtime  insulin lispro (ADMELOG) corrective regimen sliding scale   SubCutaneous three times a day before meals  insulin lispro Injectable (ADMELOG) 3 Unit(s) SubCutaneous three times a day before meals  lisinopril 10 milliGRAM(s) Oral daily  magnesium sulfate  IVPB 1 Gram(s) IV Intermittent once  regadenoson Injectable 0.4 milliGRAM(s) IV Push once    Drips:  dextrose 5%. 1000 milliLiter(s) (50 mL/Hr) IV Continuous <Continuous>  dextrose 5%. 1000 milliLiter(s) (100 mL/Hr) IV Continuous <Continuous>  lactated ringers. 1000 milliLiter(s) (75 mL/Hr) IV Continuous <Continuous>    PRN:     Allergies    No Known Allergies    Intolerances       Chief complaint: Patient is a 84y old  Male who presents with a chief complaint of Fall (07 Oct 2024 11:54)    HPI: In brief, Adriano Grove is an 85 yo M with PMHx of HTN, HLD, DM, brought to ED for fall with possible syncope. In the ED patients ECG showed new TWI in inferolateral leads, elevated troponin (370> 344 > 280), elevated proBNP 1655, Creatine Kinase >6000, ARIELLE on admission Cr 2.6. Patient was admitted to [facility] for syncope wkup, cardiology consulted, started on ASA 81mg, TTE showed EF 50-55%, G2DD.  Left heart cath was deferred till kidney function improved likely 2/2 to rhabdomyolysis and possible UTI, He received IV fluids, and was started on antibiotics, subsequently his kidney function improved Cr: 2.6 -> 2.4 -> 1.4 -> 1.2. EP consulted recommended orthostatics which were negative & TSH wnl. Neurology was consulted for seizure evaluation- MRI head done did not show any acute findings and EEG was normal. After improvement in kidney function, 10/8, patent underwent lexiscan stress test which showed Fixed Defect in the inferior wall the left ventricle with incomplete thickening suggesting prior injury. decision was made to transfer patient to cardiac telemetry for continued management and ischemic work up with [facility].      Interval history: Patient seen and examined at bedside this AM. NAD. No overnight events. NPO for C.     Review of systems: A complete 10-point review of systems was obtained and is negative except as stated in the interval history.    Vitals:  ICU Vital Signs Last 24 Hrs  T(C): 36.3 (09 Oct 2024 07:51), Max: 36.9 (08 Oct 2024 22:55)  T(F): 97.4 (09 Oct 2024 07:51), Max: 98.4 (08 Oct 2024 22:55)  HR: 59 (09 Oct 2024 07:51) (53 - 94)  BP: 173/96 (09 Oct 2024 07:51) (132/60 - 173/96)  BP(mean): 128 (09 Oct 2024 07:51) (87 - 128)  RR: 18 (09 Oct 2024 07:51) (15 - 18)  SpO2: 96% (09 Oct 2024 07:51) (95% - 97%)    O2 Parameters below as of 09 Oct 2024 07:51  Patient On (Oxygen Delivery Method): room air    Ins & outs:     10-06 @ 07:01  -  10-07 @ 07:00  --------------------------------------------------------  IN: 1893 mL / OUT: 450 mL / NET: 1443 mL    10-07 @ 07:01  -  10-08 @ 07:00  --------------------------------------------------------  IN: 1416 mL / OUT: 725 mL / NET: 691 mL    08 Oct 2024 07:01  -  09 Oct 2024 07:00  --------------------------------------------------------  IN: 1165 mL / OUT: 650 mL / NET: 515 mL    09 Oct 2024 07:01  -  09 Oct 2024 11:08  --------------------------------------------------------  IN: 300 mL / OUT: 0 mL / NET: 300 mL      Weight trend:  Weight (kg): 93.2 (10-04)    Physical exam:  General: No apparent distress  HEENT: Anicteric sclera. Moist mucous membranes.   Cardiac: Regular rate and rhythm. No murmurs, rubs, or gallops.   Vascular: Symmetric radial pulses. Dorsalis pedis pulses palpable.   Respiratory: Normal effort. Clear to ascultation.   Abdomen: Soft, nontender. Audible bowel sounds.   Extremities: Warm with no edema. No cyanosis or clubbing.   Skin: Warm and dry. No rash.   Neurologic: Grossly normal motor function.   Psychiatric: Oriented to person, place, and time.     Data reviewed:  - Telemetry: SR    - ECG (10/7/24): Sinus bradycardia. Rightward axis  - TTE (10/5/24): Summary:   1. Low-normal global left ventricular systolicfunction. Left   ventricular ejection fraction, by visual estimation, is 50 to 55%.   Moderate (Grade 2) diastolic dysfunction. No regional wall motion   abnormalities. Increased LV wall thickness (IVSd 1.4cm) with normal mass   index.   2. Normal right ventricular size and function.   3. Mildly enlarged left atrium.   4. No hemodynamically significant valvular disease.   5. Adequate TR velocity was not obtained to accurately assess RVSP.   6. There is no evidence of pericardial effusion.  - Stress test: 10/8/24 Impression:  1.   No evidence for ischemia during Lexiscan infusion as described above.  2.  Fixed Defect in the inferior wall the left ventricle with incomplete   thickening suggesting prior injury (scar).  3.  Mild global hypokinesis. Incomplete thickening of the inferior wall   the left ventricle suggesting prior injury (scar). All other walls of the   left ventricle thicken.  4.  Left ventricular ejection fraction calculated as 47% which is low.   Echocardiographic ejection fraction by visual estimation was 50-55% on   10/05/2024. Wall motion analysis suggests ejection fraction of 45%    - Cardiac catheterization: pending   -MR head IMPRESSION:  No acute infarct, acute intracranial hemorrhage, or mass effect.    - Labs:                        10.6   7.00  )-----------( 235      ( 09 Oct 2024 05:40 )             32.1     10-09    139  |  106  |  19  ----------------------------<  132[H]  4.3   |  25  |  1.1    Ca    8.5      09 Oct 2024 05:40  Mg     1.9     10-09      Triglycerides, Serum: 163 mg/dL (10-06-24 @ 05:46)  LDL Cholesterol Calculated: 54 mg/dL (10-06-24 @ 05:46)    Thyroid Stimulating Hormone, Serum: 3.72 uIU/mL (10-05-24 @ 10:54)    Urinalysis Basic - ( 09 Oct 2024 05:40 )    Color: x / Appearance: x / SG: x / pH: x  Gluc: 132 mg/dL / Ketone: x  / Bili: x / Urobili: x   Blood: x / Protein: x / Nitrite: x   Leuk Esterase: x / RBC: x / WBC x   Sq Epi: x / Non Sq Epi: x / Bacteria: x        Medications:  aspirin  chewable 81 milliGRAM(s) Oral daily  atorvastatin 20 milliGRAM(s) Oral at bedtime  dextrose 50% Injectable 25 Gram(s) IV Push once  dextrose 50% Injectable 25 Gram(s) IV Push once  dextrose 50% Injectable 12.5 Gram(s) IV Push once  glucagon  Injectable 1 milliGRAM(s) IntraMuscular once  heparin   Injectable 5000 Unit(s) SubCutaneous every 8 hours  insulin glargine Injectable (LANTUS) 9 Unit(s) SubCutaneous at bedtime  insulin lispro (ADMELOG) corrective regimen sliding scale   SubCutaneous three times a day before meals  insulin lispro Injectable (ADMELOG) 3 Unit(s) SubCutaneous three times a day before meals  lisinopril 10 milliGRAM(s) Oral daily  magnesium sulfate  IVPB 1 Gram(s) IV Intermittent once  regadenoson Injectable 0.4 milliGRAM(s) IV Push once    Drips:  dextrose 5%. 1000 milliLiter(s) (50 mL/Hr) IV Continuous <Continuous>  dextrose 5%. 1000 milliLiter(s) (100 mL/Hr) IV Continuous <Continuous>  lactated ringers. 1000 milliLiter(s) (75 mL/Hr) IV Continuous <Continuous>    PRN:     Allergies    No Known Allergies    Intolerances

## 2024-10-09 NOTE — CHART NOTE - NSCHARTNOTEFT_GEN_A_CORE
PREOPERATIVE DAY OF PROCEDURE EVALUATION:  I have personally seen and examined the patient.  I agree with the history and physical which I have reviewed and noted any changes below.  (Signed electronically by __________)  10-09-24 @ 16:41    85 yo M with PMHx of HTN, HLD, DM, brought to ED for fall with possible syncope. In the ED patients ECG showed new TWI in inferolateral leads, elevated troponin (370> 344 > 280), elevated proBNP 1655, Creatine Kinase >6000, ARIELLE on admission Cr 2.6. Patient was admitted to [facility] for syncope wkup, cardiology consulted, started on ASA 81mg, TTE showed EF 50-55%, G2DD.  Left heart cath was deferred till kidney function improved likely 2/2 to rhabdomyolysis and possible UTI, He received IV fluids, and was started on antibiotics, subsequently his kidney function improved Cr: 2.6 -> 2.4 -> 1.4 -> 1.2. EP consulted recommended orthostatics which were negative & TSH wnl. Neurology was consulted for seizure evaluation- MRI head done did not show any acute findings and EEG was normal. After improvement in kidney function, 10/8, patent underwent lexiscan stress test which showed Fixed Defect in the inferior wall the left ventricle with incomplete thickening suggesting prior injury. decision was made to transfer patient to cardiac telemetry for continued management and presents today to cardiology department for ischemic work up with [facility].        < from: TTE Echo Complete w/o Contrast w/ Doppler (10.05.24 @ 12:34) >  Summary:   1. Low-normal global left ventricular systolicfunction. Left   ventricular ejection fraction, by visual estimation, is 50 to 55%.   Moderate (Grade 2) diastolic dysfunction. No regional wall motion   abnormalities. Increased LV wall thickness (IVSd 1.4cm) with normal mass   index.   2. Normal right ventricular size and function.   3. Mildly enlarged left atrium.   4. No hemodynamically significant valvular disease.   5. Adequate TR velocity was not obtained to accurately assess RVSP.   6. There is no evidence of pericardial effusion.        < from: NM Nuclear Stress Pharmacologic Multiple (10.08.24 @ 10:15) >  Impression:  1.   No evidence for ischemia during Lexiscan infusion as described above.  2.  Fixed Defect in the inferior wall the left ventricle with incomplete   thickening suggesting prior injury (scar).  3.  Mild global hypokinesis. Incomplete thickening of the inferior wall   the left ventricle suggesting prior injury (scar). All other walls of the   left ventricle thicken.  4.  Left ventricular ejection fraction calculated as 47% which is low.   Echocardiographic ejection fraction by visual estimation was 50-55% on   10/05/2024. Wall motion analysis suggests ejection fraction of 45%        Cath Bleeding Risk: 2.4%    Prehydration:                             (x )  ml bolus x 1 hr prior to cardiac cath followed by                           ( x) 75cc/hr until procedure                           ( ) 50cc/hr until procedure                          ( ) no fluids d/t    Antianginals: NO BB due to bradycardia                     Attila test: PREOPERATIVE DAY OF PROCEDURE EVALUATION:  I have personally seen and examined the patient.  I agree with the history and physical which I have reviewed and noted any changes below.  (Signed electronically by __________)  10-09-24 @ 16:41    83 yo M with PMHx of HTN, HLD, DM, brought to ED for fall with possible syncope. In the ED patients ECG showed new TWI in inferolateral leads, elevated troponin (370> 344 > 280), elevated proBNP 1655, Creatine Kinase >6000, ARIELLE on admission Cr 2.6. Patient was admitted to [facility] for syncope wkup, cardiology consulted, started on ASA 81mg, TTE showed EF 50-55%, G2DD.  Left heart cath was deferred till kidney function improved likely 2/2 to rhabdomyolysis and possible UTI, He received IV fluids, and was started on antibiotics, subsequently his kidney function improved Cr: 2.6 -> 2.4 -> 1.4 -> 1.2. EP consulted recommended orthostatics which were negative & TSH wnl. Neurology was consulted for seizure evaluation- MRI head done did not show any acute findings and EEG was normal. After improvement in kidney function, 10/8, patent underwent lexiscan stress test which showed Fixed Defect in the inferior wall the left ventricle with incomplete thickening suggesting prior injury. decision was made to transfer patient to cardiac telemetry for continued management and presents today to cardiology department for ischemic work up with [facility].        < from: TTE Echo Complete w/o Contrast w/ Doppler (10.05.24 @ 12:34) >  Summary:   1. Low-normal global left ventricular systolic function. Left   ventricular ejection fraction, by visual estimation, is 50 to 55%.   Moderate (Grade 2) diastolic dysfunction. No regional wall motion   abnormalities. Increased LV wall thickness (IVSd 1.4cm) with normal mass   index.   2. Normal right ventricular size and function.   3. Mildly enlarged left atrium.   4. No hemodynamically significant valvular disease.   5. Adequate TR velocity was not obtained to accurately assess RVSP.   6. There is no evidence of pericardial effusion.        < from: NM Nuclear Stress Pharmacologic Multiple (10.08.24 @ 10:15) >  Impression:  1.   No evidence for ischemia during Lexiscan infusion as described above.  2.  Fixed Defect in the inferior wall the left ventricle with incomplete   thickening suggesting prior injury (scar).  3.  Mild global hypokinesis. Incomplete thickening of the inferior wall   the left ventricle suggesting prior injury (scar). All other walls of the   left ventricle thicken.  4.  Left ventricular ejection fraction calculated as 47% which is low.   Echocardiographic ejection fraction by visual estimation was 50-55% on   10/05/2024. Wall motion analysis suggests ejection fraction of 45%        Cath Bleeding Risk: 2.4%    Prehydration:                             (x )  ml bolus x 1 hr prior to cardiac cath followed by                           ( x) 75cc/hr until procedure                           ( ) 50cc/hr until procedure                          ( ) no fluids d/t    Antianginals: NO BB due to bradycardia                     Attila test: PREOPERATIVE DAY OF PROCEDURE EVALUATION:  I have personally seen and examined the patient.  I agree with the history and physical which I have reviewed and noted any changes below.  (Signed electronically by __________)  10-09-24 @ 16:41    85 yo M with PMHx of HTN, HLD, DM, brought to ED for fall with possible syncope. In the ED patients ECG showed new TWI in inferolateral leads, elevated troponin (370> 344 > 280), elevated proBNP 1655, Creatine Kinase >6000, ARIELLE on admission Cr 2.6. Patient was admitted to [facility] for syncope workup, cardiology consulted, started on ASA 81mg, TTE showed EF 50-55%, G2DD.  Left heart cath was deferred till kidney function improved likely 2/2 to rhabdomyolysis and possible UTI, He received IV fluids, and was started on antibiotics, subsequently his kidney function improved Cr: 2.6 -> 2.4 -> 1.4 -> 1.2. EP consulted recommended orthostatics which were negative & TSH wnl. Neurology was consulted for seizure evaluation- MRI head done did not show any acute findings and EEG was normal. After improvement in kidney function, 10/8, patent underwent lexiscan stress test which showed Fixed Defect in the inferior wall the left ventricle with incomplete thickening suggesting prior injury. decision was made to transfer patient to cardiac telemetry for continued management and presents today to cardiology department for ischemic work up with [facility].        < from: TTE Echo Complete w/o Contrast w/ Doppler (10.05.24 @ 12:34) >  Summary:   1. Low-normal global left ventricular systolic function. Left   ventricular ejection fraction, by visual estimation, is 50 to 55%.   Moderate (Grade 2) diastolic dysfunction. No regional wall motion   abnormalities. Increased LV wall thickness (IVSd 1.4cm) with normal mass   index.   2. Normal right ventricular size and function.   3. Mildly enlarged left atrium.   4. No hemodynamically significant valvular disease.   5. Adequate TR velocity was not obtained to accurately assess RVSP.   6. There is no evidence of pericardial effusion.        < from: NM Nuclear Stress Pharmacologic Multiple (10.08.24 @ 10:15) >  Impression:  1.   No evidence for ischemia during Lexiscan infusion as described above.  2.  Fixed Defect in the inferior wall the left ventricle with incomplete   thickening suggesting prior injury (scar).  3.  Mild global hypokinesis. Incomplete thickening of the inferior wall   the left ventricle suggesting prior injury (scar). All other walls of the   left ventricle thicken.  4.  Left ventricular ejection fraction calculated as 47% which is low.   Echocardiographic ejection fraction by visual estimation was 50-55% on   10/05/2024. Wall motion analysis suggests ejection fraction of 45%        Cath Bleeding Risk: 2.4%    Prehydration:                             (x )  ml bolus x 1 hr prior to cardiac cath followed by                           ( x) 75cc/hr until procedure                           ( ) 50cc/hr until procedure                          ( ) no fluids d/t    Antianginals: NO BB due to bradycardia,                     Imdur 15mg                      Amlodipine 2.5mg                     Attila test: positive

## 2024-10-09 NOTE — DISCHARGE NOTE PROVIDER - CARE PROVIDERS DIRECT ADDRESSES
,[email or web address]

## 2024-10-09 NOTE — DISCHARGE NOTE PROVIDER - NSDCACTIVITY_GEN_ALL_CORE
Do not drive or operate machinery/No heavy lifting/straining/Activity as tolerated Do not drive or operate machinery/Showering allowed/Do not make important decisions/Stairs allowed/Walking - Indoors allowed/No heavy lifting/straining/Walking - Outdoors allowed/Follow Instructions Provided by your Surgical Team/Activity as tolerated

## 2024-10-09 NOTE — DISCHARGE NOTE PROVIDER - NSDCCPTREATMENT_GEN_ALL_CORE_FT
PRINCIPAL PROCEDURE  Procedure: Left heart catheterization  Findings and Treatment: - Please take aspirin 81 mg daily and ***, unless directed by your Cardiologist.  - Do not drive or operate heavy machinery for 24 hours.  - After 24 hours, you may shower and remove the dressing from the site.  - Avoid using affected arm for 24 hours.  - No heavy lifting (objects more than 5 lbs) for 1 week.  - Do not bathe or swim for 1 week.   - Do not rub or apply lotion, cream, or powder to the affected site. Leave it open to the air.   - Any sudden swelling, redness, fever, discharge, or severe pain, call your Cardiologist or call the Catheterization Lab at [phone].  - If there is bleeding from the puncture site, apply direct firm pressure on the site and call [phone].

## 2024-10-09 NOTE — CONSULT NOTE ADULT - TIME BILLING
CTS Attending  -------------------    Patient interviewed and examined as noted.  CT chest/abd, brain MRI, U/S abd, Neck CT, reviewed  Angiogram and catheterization data reviewed.  Case discussed with referring cardiologist.  Patient referred for CABG in the setting of multi-vessel disease, preserved LVEF and poor suitability for PCI.    Procedure explained in detail to the patient, including risks, benefits and alternatives, and the patient agreed to proceed with surgery.  Consent forms signed and in the chart.  Arrangements being made for preop testing to be completed.    -FMR
Review of imaging and chart; obtaining history; examination of pt; discussion and coordination of care.
syncope

## 2024-10-09 NOTE — DISCHARGE NOTE PROVIDER - NSDCMRMEDTOKEN_GEN_ALL_CORE_FT
ALLOPURINOL 100 MG TABLET: 1 tab(s) orally once a day  ATORVASTATIN 40 MG TABLET: 1 tab(s) orally once a day  dapagliflozin 5 mg oral tablet: 1 tab(s) orally once a day  ezetimibe 10 mg oral tablet: 1 tab(s) orally once a day  JANUMET 50/500 MG TABLET MS: TAKE 1 TABLET EVERY DAY  RAMIPRIL 10 MG CAPS: TAKE 1 CAPSULE EVERY DAY   alcohol swabs: Apply topically to affected area once a day  ALLOPURINOL 100 MG TABLET: 1 tab(s) orally once a day  ATORVASTATIN 40 MG TABLET: 1 tab(s) orally once a day  colchicine 0.6 mg oral tablet: 1 tab(s) orally 2 times a day  dapagliflozin 5 mg oral tablet: 1 tab(s) orally once a day  Ecotrin 325 mg oral delayed release tablet: 1 tab(s) orally once a day  ezetimibe 10 mg oral tablet: 1 tab(s) orally once a day  FeroSul 325 mg (65 mg elemental iron) oral tablet: 1 tab(s) orally once a day  Flomax 0.4 mg oral capsule: 1 cap(s) orally once a day (at bedtime)  folic acid 1 mg oral tablet: 1 tab(s) orally once a day  glucometer (per patient&#x27;s insurance): Test blood sugars once a day. Dispense #1 glucometer.  JANUMET 50/500 MG TABLET MS: TAKE 1 TABLET EVERY DAY  lancets: 1 application subcutaneously once a day  Lasix 20 mg oral tablet: 1 tab(s) orally once a day  Metoprolol Tartrate 25 mg oral tablet: 1 tab(s) orally 2 times a day  oxycodone-acetaminophen 5 mg-325 mg oral tablet: 1 tab(s) orally every 4 hours as needed for  moderate pain MDD: 6  Pacerone 200 mg oral tablet: 1 tab(s) orally once a day  Plavix 75 mg oral tablet: 1 tab(s) orally once a day  Protonix 40 mg oral delayed release tablet: 1 tab(s) orally once a day  test strips (per patient&#x27;s insurance): 1 application subcutaneously once a day. ** Compatible with patient&#x27;s glucometer **

## 2024-10-09 NOTE — DISCHARGE NOTE PROVIDER - CARE PROVIDER_API CALL
Mitchell Chamorro  Thoracic and Cardiac Surgery  [address], [address]  [place] [address]  Phone: [phone]  Fax: [phone]  Follow Up Time:     Reese Blanchard  Internal Medicine  [address] [place] [id number]  Phone: [phone]  Fax: [phone]  Follow Up Time:     Suleman Griffith  Interventional Cardiology  [address], [address]  [place] [address]  Phone: [phone]  Fax: [phone]  Follow Up Time:

## 2024-10-09 NOTE — DISCHARGE NOTE PROVIDER - NSDCCPCAREPLAN_GEN_ALL_CORE_FT
PRINCIPAL DISCHARGE DIAGNOSIS  Diagnosis: Syncope  Assessment and Plan of Treatment:       SECONDARY DISCHARGE DIAGNOSES  Diagnosis: Abnormal EKG  Assessment and Plan of Treatment:     Diagnosis: Acute cystitis  Assessment and Plan of Treatment:     Diagnosis: ARIELLE (acute kidney injury)  Assessment and Plan of Treatment:     Diagnosis: Elevated troponin  Assessment and Plan of Treatment:

## 2024-10-09 NOTE — DISCHARGE NOTE PROVIDER - NSDCHHNEEDSERVICE_GEN_ALL_CORE
Addended by: MICHEL MAHONEY on: 9/30/2021 06:57 AM     Modules accepted: Orders     Medication teaching and assessment/Observation and assessment/Wound care and assessment

## 2024-10-09 NOTE — DISCHARGE NOTE PROVIDER - NSDCFUSCHEDAPPT_GEN_ALL_CORE_FT
Mitchell Chamorro  [facility]  CTS[facility]G [address]  Scheduled Appointment: 10/23/2024

## 2024-10-09 NOTE — DISCHARGE NOTE PROVIDER - NSDCFUADDAPPT_GEN_ALL_CORE_FT
please follow up with Dr. Chamorro on Wednesday 10/23 at 10:30 am after having a repeat CMP.    please call to follow up with cardiology in 2 weeks and please call PMD to follow up in 2-4 weeks.

## 2024-10-09 NOTE — DISCHARGE NOTE PROVIDER - HOSPITAL COURSE
In brief, Adriano Gorve is an 85 yo M with PMHx of HTN, HLD, DM, brought to ED for fall with possible syncope. In the ED patients ECG showed new TWI in inferolateral leads, elevated troponin (370> 344 > 280), elevated proBNP 1655, Creatine Kinase >6000, ARIELLE on admission Cr 2.6. Patient was admitted to [facility] for syncope wkup, cardiology consulted, started on ASA 81mg, TTE showed EF 50-55%, G2DD.  Left heart cath was deferred till kidney function improved likely 2/2 to rhabdomyolysis and possible UTI, He received IV fluids, and was started on antibiotics (course completed) subsequently his kidney function improved Cr: 2.6 -> 2.4 -> 1.4 -> 1.1. EP consulted recommended orthostatics which were negative & TSH wnl. Neurology was consulted for seizure evaluation- MRI head done did not show any acute findings and EEG was normal. After improvement in kidney function, 10/8, patent underwent lexiscan stress test which showed Fixed Defect in the inferior wall the left ventricle with incomplete thickening suggesting prior injury. Decision was made to transfer patient to cardiac telemetry for continued management and ischemic work up with [facility]. On 10/9/24 patient undernoted LHC which revealed         On POD 1 patient remains HD stable with no complaints. Patient remains in SR with no arrhythmias noted on tele. EKG performed showed no acute ST changes. Examination of (right radial artery/right common femoral artery) showed a C/DI site with no hematoma, erythema or bruit. Distal pulses are 2+ bilaterally. Renal function remains stable post cath. Patient will be discharged home on DAPT with ASA and (Plavix/Effient/Brilinta).  Patient is being DC home in stable condition.    Patient will follow up with In brief, Adriano Grove is an 85 yo M with PMHx of HTN, HLD, DM, brought to ED for fall with possible syncope. In the ED patients ECG showed new TWI in inferolateral leads, elevated troponin (370> 344 > 280), elevated proBNP 1655, Creatine Kinase >6000, ARIELLE on admission Cr 2.6. Patient was admitted to [facility] for syncope wkup, cardiology consulted, started on ASA 81mg, TTE showed EF 50-55%, G2DD.  Left heart cath was deferred till kidney function improved likely 2/2 to rhabdomyolysis and possible UTI, He received IV fluids, and was started on antibiotics (course completed) subsequently his kidney function improved Cr: 2.6 -> 2.4 -> 1.4 -> 1.1. EP consulted recommended orthostatics which were negative & TSH wnl. Neurology was consulted for seizure evaluation- MRI head done did not show any acute findings and EEG was normal. After improvement in kidney function, 10/8, patent underwent lexiscan stress test which showed Fixed Defect in the inferior wall the left ventricle with incomplete thickening suggesting prior injury. Decision was made to transfer patient to cardiac telemetry for continued management and ischemic work up with [facility]. On 10/9/24 patient undernoted C which revealed     In brief, Adriano Grove is an 83 yo M with PMHx of HTN, HLD, DM, brought to ED for fall with possible syncope. In the ED patients ECG showed new TWI in inferolateral leads, elevated troponin (370> 344 > 280), elevated proBNP 1655, Creatine Kinase >6000, ARIELLE on admission Cr 2.6. Patient was admitted to [facility] for syncope wkup, cardiology consulted, started on ASA 81mg, TTE showed EF 50-55%, G2DD.  Left heart cath was deferred until his kidney function improved likely 2/2 to rhabdomyolysis and possible UTI, He received IV fluids, and was started on antibiotics (course completed) subsequently his kidney function improved Cr: 2.6 -> 2.4 -> 1.4 -> 1.1. EP consulted recommended orthostatics which were negative & TSH wnl. Neurology was consulted for seizure evaluation- MRI head done did not show any acute findings and EEG was normal. After improvement in kidney function, 10/8, patent underwent lexiscan stress test which showed Fixed Defect in the inferior wall the left ventricle with incomplete thickening suggesting prior injury. Decision was made to transfer patient to cardiac telemetry for continued management and ischemic work up with [facility], which demonstrated multi-vessel CAD.  The patient underwent CABG on 10/11 and had post op course complicated by pericarditis that was treated with Colchicine.  He developed a. fib, but converted to SR pharmacologically.  The patient otherwise had an uneventful hospital course and was discharged home in stable condition on POD # 5 with instructions to have repeat LFT's.  The patient was not treated with an ACEI/ARB due to borderline BP.      Pt was educated on the importance of attending cardiac rehab post op and was given materials re cardiac rehab program.  He was instructed to inquire further upon seeing his cardiologist.

## 2024-10-09 NOTE — PROGRESS NOTE ADULT - TIME BILLING
Interviewed and examined patient. Reviewed hospital course, diagnostic tests, lab work, and medications. Discussed plan with patient.

## 2024-10-09 NOTE — CONSULT NOTE ADULT - SUBJECTIVE AND OBJECTIVE BOX
Surgeon: Dr. Portillo/Pedro Pablo/Bobbi    Consult requesting by: Dr Griffith    HISTORY OF PRESENT ILLNESS:  Adriano Grove is an 85 yo M with PMHx of HTN, HLD, DM, brought to ED for fall with possible syncope. In the ED patients ECG showed new TWI in inferolateral leads, elevated troponin (370> 344 > 280), elevated proBNP 1655, Creatine Kinase >6000, ARIELLE on admission Cr 2.6. Patient was admitted to [facility] for syncope wkup, cardiology consulted, started on ASA 81mg, TTE showed EF 50-55%, G2DD.  Left heart cath was deferred till kidney function improved likely 2/2 to rhabdomyolysis and possible UTI, He received IV fluids, and was started on antibiotics (course completed) subsequently his kidney function improved Cr: 2.6 -> 2.4 -> 1.4 -> 1.1. EP consulted recommended orthostatics which were negative & TSH wnl. Neurology was consulted for seizure evaluation- MRI head done did not show any acute findings and EEG was normal. After improvement in kidney function, 10/8, patent underwent lexiscan stress test which showed Fixed Defect in the inferior wall the left ventricle with incomplete thickening suggesting prior injury. Decision was made to transfer patient to cardiac telemetry for continued management and ischemic work up with [facility]. On 10/9/24 patient undernoted C which revealed 3vCAD. Ct Surgery consulted for CABG evaluation.           PAST MEDICAL & SURGICAL HISTORY:  DM (diabetes mellitus)      HTN (hypertension)      HLD (hyperlipidemia)      H/O knee surgery    cholecystectomy         NYHA functional class    [ ] Class I (no limitation) [x ] Class II (slight limitation) [ ] Class III (marked limitation) [ ] Class IV (symptoms at rest)    [place] [employer] Society grading of angina pectoris  [ ]  Class I	Angina only during strenuous or prolonged physical activity  [x ]  Class II	Slight limitation, with angina only during vigorous physical activity  [ ]  Class III	Symptoms with everyday living activities, ie, moderate limitation  [ ]  Class IV	Inability to perform any activity without angina or angina at rest, ie, severe limitation    MEDICATIONS  (STANDING):  aspirin  chewable 81 milliGRAM(s) Oral daily  atorvastatin 20 milliGRAM(s) Oral at bedtime  dextrose 5%. 1000 milliLiter(s) (50 mL/Hr) IV Continuous <Continuous>  dextrose 5%. 1000 milliLiter(s) (100 mL/Hr) IV Continuous <Continuous>  dextrose 50% Injectable 25 Gram(s) IV Push once  dextrose 50% Injectable 25 Gram(s) IV Push once  dextrose 50% Injectable 12.5 Gram(s) IV Push once  glucagon  Injectable 1 milliGRAM(s) IntraMuscular once  insulin glargine Injectable (LANTUS) 9 Unit(s) SubCutaneous at bedtime  insulin lispro (ADMELOG) corrective regimen sliding scale   SubCutaneous three times a day before meals  insulin lispro Injectable (ADMELOG) 3 Unit(s) SubCutaneous three times a day before meals  isosorbide   mononitrate ER Tablet (IMDUR) 15 milliGRAM(s) Oral once  lactated ringers. 1000 milliLiter(s) (75 mL/Hr) IV Continuous <Continuous>  lisinopril 10 milliGRAM(s) Oral daily  regadenoson Injectable 0.4 milliGRAM(s) IV Push once    MEDICATIONS  (PRN):  dextrose Oral Gel 15 Gram(s) Oral once PRN Blood Glucose LESS THAN 70 milliGRAM(s)/deciliter    Antiplatelet therapy:                           Last dose/amt:    Home Medications:  ALLOPURINOL 100 MG TABLET: 1 tab(s) orally once a day (05 Oct 2024 04:51)  ATORVASTATIN 40 MG TABLET: 1 tab(s) orally once a day (05 Oct 2024 04:51)  dapagliflozin 5 mg oral tablet: 1 tab(s) orally once a day (05 Oct 2024 13:01)  ezetimibe 10 mg oral tablet: 1 tab(s) orally once a day (05 Oct 2024 04:51)  JANUMET 50/500 MG TABLET MS: TAKE 1 TABLET EVERY DAY (05 Oct 2024 04:51)  RAMIPRIL 10 MG CAPS: TAKE 1 CAPSULE EVERY DAY (05 Oct 2024 04:51)      Allergies    No Known Allergies    Intolerances        SOCIAL HISTORY:  Smoker: [ ] Yes  [ x] No        PACK YEARS:                         WHEN QUIT?  ETOH use: [ ] Yes  [x ] No              FREQUENCY / QUANTITY:  Illicit Drug use:  [ ] Yes  [x ] No  Occupation: retired sanitation worked  Lives with: wife, has 3 sons  Assisted device use: none  5 meter walk test: 1____sec, 2____sec, 3___sec, unable to do just cath  FAMILY HISTORY:  No pertinent family history in first degree relatives        Review of Systems  CONSTITUTIONAL:  Fevers[ ] chills[ ] sweats[ ] fatigue[ ] weight loss[ ] weight gain [ ]                                     NEGATIVE [X ]   NEURO:  paresthesias[ ] seizures [ ]  syncope [ ]  confusion [ ]                                                                                NEGATIVE[ X]   EYES: glasses[ ]  blurry vision[ ]  discharge[ ] pain[ ] glaucoma [ ]                                                                          NEGATIVE[X ]   ENMT:  difficulty hearing [ ]  vertigo[ ]  dysphagia[ ] epistaxis[ ] recent dental work [ ]                                    NEGATIVE[ X]   CV:  chest pain[ ] palpitations[ ] PINEDA [ ] diaphoresis [ ]                                                                                           NEGATIVE[ X]   RESPIRATORY:  wheezing[ ] SOB[ ] cough [ ] sputum[ ] hemoptysis[ ]                                                                  NEGATIVE[ ]   GI:  nausea[ ]  vomiting [ ]  diarrhea[ ] constipation [ ] melena [ ]                                                                         NEGATIVE[ X]   : hematuria[ ]  dysuria[ ] urgency[ ] incontinence[ ]                                                                                            NEGATIVE[ X]   MUSKULOSKELETAL:  arthritis[ ]  joint swelling [ ] muscle weakness [ ] Hx vein stripping [ ]                             NEGATIVE[X ]   SKIN/BREAST:  rash[ ] itching [ ]  hair loss[ ] masses[ ]                                                                                              NEGATIVE[ X]   PSYCH:  dementia [ ] depression [ ] anxiety[ ]                                                                                                               NEGATIVE[X ]   HEME/LYMPH:  bruises easily[ ] enlarged lymph nodes[ ] tender lymph nodes[ ]                                               NEGATIVE[ X]   ENDOCRINE:  cold intolerance[ ] heat intolerance[ ] polydipsia[ ]                                                                          NEGATIVE[ X]     PHYSICAL EXAM  Vital Signs Last 24 Hrs  T(C): 36.3 (09 Oct 2024 07:51), Max: 36.9 (08 Oct 2024 22:55)  T(F): 97.4 (09 Oct 2024 07:51), Max: 98.4 (08 Oct 2024 22:55)  HR: 56 (09 Oct 2024 17:14) (49 - 59)  BP: 173/85 (09 Oct 2024 17:14) (132/60 - 173/96)  BP(mean): 99 (09 Oct 2024 11:32) (87 - 128)  RR: 14 (09 Oct 2024 17:14) (14 - 20)  SpO2: 97% (09 Oct 2024 17:14) (95% - 97%)    Parameters below as of 09 Oct 2024 17:14  Patient On (Oxygen Delivery Method): room air      Right arm bp:                                 Left arm bp;    CONSTITUTIONAL:  WNL[ x]   Neuro: WNL [x ] Normal exam oriented to person/place & time with no focal motor or sensory  deficits. Other                     Eyes:    WNL [x ] Normal exam of conjunctiva & lids, pupils equally reactive. Other     ENT:     WNL [x ] Normal exam of nasal/oral mucosa with absence of cyanosis. Other  Neck:   WNL [ x] Normal exam of jugular veins, trachea & thyroid. Other  Chest:  WNL [ x] Normal lung exam with good air movement absence of wheezes, rales, or rhonchi: Other                                                                                CV:  Auscultation: normal [x ] S3[ ] S4[ ] Irregular [ ] Rub[ ] Clicks[ ]    Murmurs none:[x ]systolic [ ]  diastolic [ ] holosystolic [ ]  Carotids: No Bruits[x ] Other                  Abdominal Aorta: normal [ ] nonpalpable[ ]Other                                                                                      GI: WNL[x] Normal exam of abdomen, liver & spleen with no noted masses or tenderness. Other                                                                                                        Extremities: WNL[ ] Normal no evidence of cyanosis or deformity Edema: none[ ]trace[ ]1+[ ]2+[ ]3+[ ]4+[ ]  Lower Extremity Pulses: Right[ ] Left[ ]Varicosities[ ]  SKIN :WNL[ ] Normal exam to inspection & palpation. Other:                                                          LABS:                        10.6   7.00  )-----------( 235      ( 09 Oct 2024 05:40 )             32.1     10-09    139  |  106  |  19  ----------------------------<  132[H]  4.3   |  25  |  1.1    Ca    8.5      09 Oct 2024 05:40  Mg     1.9     10-09        Urinalysis Basic - ( 09 Oct 2024 05:40 )    Color: x / Appearance: x / SG: x / pH: x  Gluc: 132 mg/dL / Ketone: x  / Bili: x / Urobili: x   Blood: x / Protein: x / Nitrite: x   Leuk Esterase: x / RBC: x / WBC x   Sq Epi: x / Non Sq Epi: x / Bacteria: x              COVID Result:     Cardiac Cath:    TTE / ASTRID:    STS Score:     Impression:  CAD [ ]  Valvular  disease [ ]   Aortic Disease [ ]   ARIELLE: Yes[ ] No [ ]   CKD Stage I [ ] , Stage II [ ] , Stage III [ ], Stage IV [ ]   Anemia: Yes [ ], No [ ]  Diabetes :Yes [ ], No [ ]  Acute MI: Yes [ ], No [ ]   Heart Failure: Yes [ ] , No [ ] HFpEF [ ], HFrEF [ ]      Assessment/ Plan: 84y Male with...  -Case and plan discussed with CT surgeon Dr. Portillo/Pedro Pablo/Bobbi. Initial STS risk assessed and discussed with patient. Evaluation by full heart team pending. Attending note to follow. Pre-op for:     Recommendations:  [] hold Plavix  [] hold ASA if Pre-op Cardiac Valve surgery and patient without CAD  [] hold ACEI/ARB/CCB 24 hours prior to planned procedure   [] LUE/RUE precaution for possible radial artery harvest      Labs:  [] CBC  [] CMP  [] PT/INR/PTT  [] BNP  [] HgA1c  [] Type and screen  [] Urinalysis  [] MRSA  [] COVID pcr    Diagnostic studies  [] CT HEAD Non-Contrast  [] CT Chest without/with contrast   [] Carotid Duplex  [] PFT: Simple PFT [ ]  Full [ ]  [] VIVIAN/PVR  [] TTE    Consultations/Evaluations   [] Renal Consult  [] Pulmonary Consult  [] Vascular Consult  [] Dental Consult   [] Hem-Onc Consult   [] GI Consult   [] Other Consultations :                 Surgeon: Dr. Portillo/Pedro Pablo/Bobbi    Consult requesting by: Dr Griffith    HISTORY OF PRESENT ILLNESS:  Adriano Grove is an 85 yo M with PMHx of HTN, HLD, DM, brought to ED for fall with possible syncope. In the ED patients ECG showed new TWI in inferolateral leads, elevated troponin (370> 344 > 280), elevated proBNP 1655, Creatine Kinase >6000, ARIELLE on admission Cr 2.6. Patient was admitted to [facility] for syncope wkup, cardiology consulted, started on ASA 81mg, TTE showed EF 50-55%, G2DD.  Left heart cath was deferred till kidney function improved likely 2/2 to rhabdomyolysis and possible UTI, He received IV fluids, and was started on antibiotics (course completed) subsequently his kidney function improved Cr: 2.6 -> 2.4 -> 1.4 -> 1.1. EP consulted recommended orthostatics which were negative & TSH wnl. Neurology was consulted for seizure evaluation- MRI head done did not show any acute findings and EEG was normal. After improvement in kidney function, 10/8, patent underwent lexiscan stress test which showed Fixed Defect in the inferior wall the left ventricle with incomplete thickening suggesting prior injury. Decision was made to transfer patient to cardiac telemetry for continued management and ischemic work up with [facility]. On 10/9/24 patient undernoted C which revealed 3vCAD. Ct Surgery consulted for CABG evaluation.           PAST MEDICAL & SURGICAL HISTORY:  DM (diabetes mellitus)      HTN (hypertension)      HLD (hyperlipidemia)      H/O knee surgery    cholecystectomy         NYHA functional class    [ ] Class I (no limitation) [x ] Class II (slight limitation) [ ] Class III (marked limitation) [ ] Class IV (symptoms at rest)    [place] Cardi[employer] Society grading of angina pectoris  [ ]  Class I	Angina only during strenuous or prolonged physical activity  [x ]  Class II	Slight limitation, with angina only during vigorous physical activity  [ ]  Class III	Symptoms with everyday living activities, ie, moderate limitation  [ ]  Class IV	Inability to perform any activity without angina or angina at rest, ie, severe limitation    MEDICATIONS  (STANDING):  aspirin  chewable 81 milliGRAM(s) Oral daily  atorvastatin 20 milliGRAM(s) Oral at bedtime  dextrose 5%. 1000 milliLiter(s) (50 mL/Hr) IV Continuous <Continuous>  dextrose 5%. 1000 milliLiter(s) (100 mL/Hr) IV Continuous <Continuous>  dextrose 50% Injectable 25 Gram(s) IV Push once  dextrose 50% Injectable 25 Gram(s) IV Push once  dextrose 50% Injectable 12.5 Gram(s) IV Push once  glucagon  Injectable 1 milliGRAM(s) IntraMuscular once  insulin glargine Injectable (LANTUS) 9 Unit(s) SubCutaneous at bedtime  insulin lispro (ADMELOG) corrective regimen sliding scale   SubCutaneous three times a day before meals  insulin lispro Injectable (ADMELOG) 3 Unit(s) SubCutaneous three times a day before meals  isosorbide   mononitrate ER Tablet (IMDUR) 15 milliGRAM(s) Oral once  lactated ringers. 1000 milliLiter(s) (75 mL/Hr) IV Continuous <Continuous>  lisinopril 10 milliGRAM(s) Oral daily  regadenoson Injectable 0.4 milliGRAM(s) IV Push once    MEDICATIONS  (PRN):  dextrose Oral Gel 15 Gram(s) Oral once PRN Blood Glucose LESS THAN 70 milliGRAM(s)/deciliter    Antiplatelet therapy:                           Last dose/amt:    Home Medications:  ALLOPURINOL 100 MG TABLET: 1 tab(s) orally once a day (05 Oct 2024 04:51)  ATORVASTATIN 40 MG TABLET: 1 tab(s) orally once a day (05 Oct 2024 04:51)  dapagliflozin 5 mg oral tablet: 1 tab(s) orally once a day (05 Oct 2024 13:01)  ezetimibe 10 mg oral tablet: 1 tab(s) orally once a day (05 Oct 2024 04:51)  JANUMET 50/500 MG TABLET MS: TAKE 1 TABLET EVERY DAY (05 Oct 2024 04:51)  RAMIPRIL 10 MG CAPS: TAKE 1 CAPSULE EVERY DAY (05 Oct 2024 04:51)      Allergies    No Known Allergies    Intolerances        SOCIAL HISTORY:  Smoker: [ ] Yes  [ x] No        PACK YEARS:                         WHEN QUIT?  ETOH use: [ ] Yes  [x ] No              FREQUENCY / QUANTITY:  Illicit Drug use:  [ ] Yes  [x ] No  Occupation: retired sanitation worked  Lives with: wife, has 3 sons  Assisted device use: none  5 meter walk test: 1____sec, 2____sec, 3___sec, unable to do just cath  FAMILY HISTORY:  No pertinent family history in first degree relatives        Review of Systems  CONSTITUTIONAL:  Fevers[ ] chills[ ] sweats[ ] fatigue[ ] weight loss[ ] weight gain [ ]                                     NEGATIVE [X ]   NEURO:  paresthesias[ ] seizures [ ]  syncope [ ]  confusion [ ]                                                                                NEGATIVE[ X]   EYES: glasses[ ]  blurry vision[ ]  discharge[ ] pain[ ] glaucoma [ ]                                                                          NEGATIVE[X ]   ENMT:  difficulty hearing [ ]  vertigo[ ]  dysphagia[ ] epistaxis[ ] recent dental work [ ]                                    NEGATIVE[ X]   CV:  chest pain[ ] palpitations[ ] PINEDA [ ] diaphoresis [ ]                                                                                           NEGATIVE[ X]   RESPIRATORY:  wheezing[ ] SOB[ ] cough [ ] sputum[ ] hemoptysis[ ]                                                                  NEGATIVE[ ]   GI:  nausea[ ]  vomiting [ ]  diarrhea[ ] constipation [ ] melena [ ]                                                                         NEGATIVE[ X]   : hematuria[ ]  dysuria[ ] urgency[ ] incontinence[ ]                                                                                            NEGATIVE[ X]   MUSKULOSKELETAL:  arthritis[ ]  joint swelling [ ] muscle weakness [ ] Hx vein stripping [ ]                             NEGATIVE[X ]   SKIN/BREAST:  rash[ ] itching [ ]  hair loss[ ] masses[ ]                                                                                              NEGATIVE[ X]   PSYCH:  dementia [ ] depression [ ] anxiety[ ]                                                                                                               NEGATIVE[X ]   HEME/LYMPH:  bruises easily[ ] enlarged lymph nodes[ ] tender lymph nodes[ ]                                               NEGATIVE[ X]   ENDOCRINE:  cold intolerance[ ] heat intolerance[ ] polydipsia[ ]                                                                          NEGATIVE[ X]     PHYSICAL EXAM  Vital Signs Last 24 Hrs  T(C): 36.3 (09 Oct 2024 07:51), Max: 36.9 (08 Oct 2024 22:55)  T(F): 97.4 (09 Oct 2024 07:51), Max: 98.4 (08 Oct 2024 22:55)  HR: 56 (09 Oct 2024 17:14) (49 - 59)  BP: 173/85 (09 Oct 2024 17:14) (132/60 - 173/96)  BP(mean): 99 (09 Oct 2024 11:32) (87 - 128)  RR: 14 (09 Oct 2024 17:14) (14 - 20)  SpO2: 97% (09 Oct 2024 17:14) (95% - 97%)    Parameters below as of 09 Oct 2024 17:14  Patient On (Oxygen Delivery Method): room air      Right arm bp:                                 Left arm bp;    CONSTITUTIONAL:  WNL[ x]   Neuro: WNL [x ] Normal exam oriented to person/place & time with no focal motor or sensory  deficits. Other                     Eyes:    WNL [x ] Normal exam of conjunctiva & lids, pupils equally reactive. Other     ENT:     WNL [x ] Normal exam of nasal/oral mucosa with absence of cyanosis. Other  Neck:   WNL [ x] Normal exam of jugular veins, trachea & thyroid. Other  Chest:  WNL [ x] Normal lung exam with good air movement absence of wheezes, rales, or rhonchi: Other                                                                                CV:  Auscultation: normal [x ] S3[ ] S4[ ] Irregular [ ] Rub[ ] Clicks[ ]    Murmurs none:[x ]systolic [ ]  diastolic [ ] holosystolic [ ]  Carotids: No Bruits[x ] Other                  Abdominal Aorta: normal [x ] nonpalpable[ ]Other                                                                                      GI: WNL[x] Normal exam of abdomen, liver & spleen with no noted masses or tenderness. Other                                                                                                        Extremities: WNL[x ] Normal no evidence of cyanosis or deformity Edema: none[ ]trace[ ]1+[ ]2+[ ]3+[ ]4+[ ]  Lower Extremity Pulses: Right[ ] Left[ ]Varicosities[ ]  SKIN :WNL[ x] Normal exam to inspection & palpation. Other:                                                          LABS:                        10.6   7.00  )-----------( 235      ( 09 Oct 2024 05:40 )             32.1     10-09    139  |  106  |  19  ----------------------------<  132[H]  4.3   |  25  |  1.1    Ca    8.5      09 Oct 2024 05:40  Mg     1.9     10-09        Urinalysis Basic - ( 09 Oct 2024 05:40 )    Color: x / Appearance: x / SG: x / pH: x  Gluc: 132 mg/dL / Ketone: x  / Bili: x / Urobili: x   Blood: x / Protein: x / Nitrite: x   Leuk Esterase: x / RBC: x / WBC x   Sq Epi: x / Non Sq Epi: x / Bacteria: x          Cardiac Cath:   LM: Mild disease    LAD: Prox LAD 80% stenosis  D1: 80% stenosis    CX: Prox Cx 70% stenosis  OM1: 70% stenosis    RCA: 100%  fills vial collaterals from left system  RPDA: mild disease, fills vial collaterals from left system    TTE / ASTRID: < from: TTE Echo Complete w/o Contrast w/ Doppler (10.05.24 @ 12:34) >  Summary:   1. Low-normal global left ventricular systolicfunction. Left   ventricular ejection fraction, by visual estimation, is 50 to 55%.   Moderate (Grade 2) diastolic dysfunction. No regional wall motion   abnormalities. Increased LV wall thickness (IVSd 1.4cm) with normal mass   index.   2. Normal right ventricular size and function.   3. Mildly enlarged left atrium.   4. No hemodynamically significant valvular disease.   5. Adequate TR velocity was not obtained to accurately assess RVSP.   6. There is no evidence of pericardial effusion.    PHYSICIAN INTERPRETATION:  Left Ventricle: The left ventricular internal cavity size is normal. Left   ventricular wall thickness is increased. Increased relative wall   thickness with normal mass index consistent with left ventricular   concentric remodeling. Global LV systolic function was low-normal. Left   ventricular ejection fraction, by visual estimation, is 50 to 55%.   Spectral Doppler shows pseudonormal pattern of left ventricular   myocardial filling (Grade II diastolic dysfunction). Elevated mean left   atrial pressure.  Right Ventricle: Normal right ventricular size and function.  Left Atrium: Mildly enlarged left atrium. LA volume Index is 38.0 ml/m²   ml/m2.  Right Atrium: Normal right atrial size.  Pericardium: There is no evidenceof pericardial effusion.  Mitral Valve: No evidence of mitral valve stenosis. Trace mitral valve   regurgitation is seen.  Tricuspid Valve: Structurally normal tricuspid valve, with normal leaflet   excursion. Trivial tricuspid regurgitation is visualized. Adequate TR   velocity was not obtained to accurately assess RVSP.  Aortic Valve: The aortic valve is trileaflet. No aortic stenosis. No   evidence of aortic valve regurgitation is seen.  Pulmonic Valve: The pulmonic valve was not well visualized.  Aorta: The aortic root and ascending aorta are structurally normal, with   no evidence of dilitation.  Venous: The inferior vena cava was normal sized, with respiratory size   variation greater than 50%.    < end of copied text >      STS Score:     Impression:  CAD [ x]  Valvular  disease [ ]   Aortic Disease [ ]   ARIELLE: Yes[x ] No [ ]   CKD Stage I [ ] , Stage II [ ] , Stage III [ ], Stage IV [ ]   Anemia: Yes [ ], No [ ]  Diabetes :Yes [x ], No [ ]  Acute MI: Yes [ x], No [ ]   Heart Failure: Yes [ ] , No [ ] HFpEF [ ], HFrEF [ ]      Assessment/ Plan: 84y Male with...  -Case and plan discussed with CT surgeon Dr. Portillo/Pedro Pablo/Bobbi. Initial STS risk assessed and discussed with patient. Evaluation by full heart team pending. Attending note to follow. Pre-op for:     Recommendations:  [x] hold Plavix  [x] hold ACEI/ARB/CCB 24 hours prior to planned procedure   [x] LUE/RUE precaution for possible radial artery harvest      Labs:  [x] CBC  [x] CMP  [x] PT/INR/PTT  [] BNP  [x] HgA1c  [x] Type and screen  [] Urinalysis  [] MRSA      Diagnostic studies  [x] CT Chest without contrast   [x] PFT: Simple PFT [x ]  Full [ ]        Consultations/Evaluations     [x] PT consult                 Surgeon: Dr. Portillo/Pedro Pablo/Bobbi    Consult requesting by: Dr Griffith    HISTORY OF PRESENT ILLNESS:  Adriano Grove is an 83 yo M with PMHx of HTN, HLD, DM, brought to ED for fall with possible syncope. In the ED patients ECG showed new TWI in inferolateral leads, elevated troponin (370> 344 > 280), elevated proBNP 1655, Creatine Kinase >6000, ARIELLE on admission Cr 2.6. Patient was admitted to [facility] for syncope wkup, cardiology consulted, started on ASA 81mg, TTE showed EF 50-55%, G2DD.  Left heart cath was deferred till kidney function improved likely 2/2 to rhabdomyolysis and possible UTI, He received IV fluids, and was started on antibiotics (course completed) subsequently his kidney function improved Cr: 2.6 -> 2.4 -> 1.4 -> 1.1. EP consulted recommended orthostatics which were negative & TSH wnl. Neurology was consulted for seizure evaluation- MRI head done did not show any acute findings and EEG was normal. After improvement in kidney function, 10/8, patent underwent lexiscan stress test which showed Fixed Defect in the inferior wall the left ventricle with incomplete thickening suggesting prior injury. Decision was made to transfer patient to cardiac telemetry for continued management and ischemic work up with [facility]. On 10/9/24 patient undernoted C which revealed 3vCAD. Ct Surgery consulted for CABG evaluation.           PAST MEDICAL & SURGICAL HISTORY:  DM (diabetes mellitus)      HTN (hypertension)      HLD (hyperlipidemia)      H/O knee surgery    cholecystectomy         NYHA functional class    [ ] Class I (no limitation) [x ] Class II (slight limitation) [ ] Class III (marked limitation) [ ] Class IV (symptoms at rest)    [place] [employer] grading of angina pectoris  [ ]  Class I	Angina only during strenuous or prolonged physical activity  [x ]  Class II	Slight limitation, with angina only during vigorous physical activity  [ ]  Class III	Symptoms with everyday living activities, ie, moderate limitation  [ ]  Class IV	Inability to perform any activity without angina or angina at rest, ie, severe limitation    MEDICATIONS  (STANDING):  aspirin  chewable 81 milliGRAM(s) Oral daily  atorvastatin 20 milliGRAM(s) Oral at bedtime  dextrose 5%. 1000 milliLiter(s) (50 mL/Hr) IV Continuous <Continuous>  dextrose 5%. 1000 milliLiter(s) (100 mL/Hr) IV Continuous <Continuous>  dextrose 50% Injectable 25 Gram(s) IV Push once  dextrose 50% Injectable 25 Gram(s) IV Push once  dextrose 50% Injectable 12.5 Gram(s) IV Push once  glucagon  Injectable 1 milliGRAM(s) IntraMuscular once  insulin glargine Injectable (LANTUS) 9 Unit(s) SubCutaneous at bedtime  insulin lispro (ADMELOG) corrective regimen sliding scale   SubCutaneous three times a day before meals  insulin lispro Injectable (ADMELOG) 3 Unit(s) SubCutaneous three times a day before meals  isosorbide   mononitrate ER Tablet (IMDUR) 15 milliGRAM(s) Oral once  lactated ringers. 1000 milliLiter(s) (75 mL/Hr) IV Continuous <Continuous>  lisinopril 10 milliGRAM(s) Oral daily  regadenoson Injectable 0.4 milliGRAM(s) IV Push once    MEDICATIONS  (PRN):  dextrose Oral Gel 15 Gram(s) Oral once PRN Blood Glucose LESS THAN 70 milliGRAM(s)/deciliter    Antiplatelet therapy:                           Last dose/amt:    Home Medications:  ALLOPURINOL 100 MG TABLET: 1 tab(s) orally once a day (05 Oct 2024 04:51)  ATORVASTATIN 40 MG TABLET: 1 tab(s) orally once a day (05 Oct 2024 04:51)  dapagliflozin 5 mg oral tablet: 1 tab(s) orally once a day (05 Oct 2024 13:01)  ezetimibe 10 mg oral tablet: 1 tab(s) orally once a day (05 Oct 2024 04:51)  JANUMET 50/500 MG TABLET MS: TAKE 1 TABLET EVERY DAY (05 Oct 2024 04:51)  RAMIPRIL 10 MG CAPS: TAKE 1 CAPSULE EVERY DAY (05 Oct 2024 04:51)      Allergies    No Known Allergies    Intolerances        SOCIAL HISTORY:  Smoker: [ ] Yes  [ x] No        PACK YEARS:                         WHEN QUIT?  ETOH use: [ ] Yes  [x ] No              FREQUENCY / QUANTITY:  Illicit Drug use:  [ ] Yes  [x ] No  Occupation: retired sanitation worked  Lives with: wife, has 3 sons  Assisted device use: none  5 meter walk test: 1____sec, 2____sec, 3___sec, unable to do just cath  FAMILY HISTORY:  No pertinent family history in first degree relatives        Review of Systems  CONSTITUTIONAL:  Fevers[ ] chills[ ] sweats[ ] fatigue[ ] weight loss[ ] weight gain [ ]                                     NEGATIVE [X ]   NEURO:  paresthesias[ ] seizures [ ]  syncope [ ]  confusion [ ]                                                                                NEGATIVE[ X]   EYES: glasses[ ]  blurry vision[ ]  discharge[ ] pain[ ] glaucoma [ ]                                                                          NEGATIVE[X ]   ENMT:  difficulty hearing [ ]  vertigo[ ]  dysphagia[ ] epistaxis[ ] recent dental work [ ]                                    NEGATIVE[ X]   CV:  chest pain[ ] palpitations[ ] PINEDA [ ] diaphoresis [ ]                                                                                           NEGATIVE[ X]   RESPIRATORY:  wheezing[ ] SOB[ ] cough [ ] sputum[ ] hemoptysis[ ]                                                                  NEGATIVE[ ]   GI:  nausea[ ]  vomiting [ ]  diarrhea[ ] constipation [ ] melena [ ]                                                                         NEGATIVE[ X]   : hematuria[ ]  dysuria[ ] urgency[ ] incontinence[ ]                                                                                            NEGATIVE[ X]   MUSKULOSKELETAL:  arthritis[ ]  joint swelling [ ] muscle weakness [ ] Hx vein stripping [ ]                             NEGATIVE[X ]   SKIN/BREAST:  rash[ ] itching [ ]  hair loss[ ] masses[ ]                                                                                              NEGATIVE[ X]   PSYCH:  dementia [ ] depression [ ] anxiety[ ]                                                                                                               NEGATIVE[X ]   HEME/LYMPH:  bruises easily[ ] enlarged lymph nodes[ ] tender lymph nodes[ ]                                               NEGATIVE[ X]   ENDOCRINE:  cold intolerance[ ] heat intolerance[ ] polydipsia[ ]                                                                          NEGATIVE[ X]     PHYSICAL EXAM  Vital Signs Last 24 Hrs  T(C): 36.3 (09 Oct 2024 07:51), Max: 36.9 (08 Oct 2024 22:55)  T(F): 97.4 (09 Oct 2024 07:51), Max: 98.4 (08 Oct 2024 22:55)  HR: 56 (09 Oct 2024 17:14) (49 - 59)  BP: 173/85 (09 Oct 2024 17:14) (132/60 - 173/96)  BP(mean): 99 (09 Oct 2024 11:32) (87 - 128)  RR: 14 (09 Oct 2024 17:14) (14 - 20)  SpO2: 97% (09 Oct 2024 17:14) (95% - 97%)    Parameters below as of 09 Oct 2024 17:14  Patient On (Oxygen Delivery Method): room air      Right arm bp:                                 Left arm bp;    CONSTITUTIONAL:  WNL[ x]   Neuro: WNL [x ] Normal exam oriented to person/place & time with no focal motor or sensory  deficits. Other                     Eyes:    WNL [x ] Normal exam of conjunctiva & lids, pupils equally reactive. Other     ENT:     WNL [x ] Normal exam of nasal/oral mucosa with absence of cyanosis. Other  Neck:   WNL [ x] Normal exam of jugular veins, trachea & thyroid. Other  Chest:  WNL [ x] Normal lung exam with good air movement absence of wheezes, rales, or rhonchi: Other                                                                                CV:  Auscultation: normal [x ] S3[ ] S4[ ] Irregular [ ] Rub[ ] Clicks[ ]    Murmurs none:[x ]systolic [ ]  diastolic [ ] holosystolic [ ]  Carotids: No Bruits[x ] Other                  Abdominal Aorta: normal [x ] nonpalpable[ ]Other                                                                                      GI: WNL[x] Normal exam of abdomen, liver & spleen with no noted masses or tenderness. Other                                                                                                        Extremities: WNL[x ] Normal no evidence of cyanosis or deformity Edema: none[ ]trace[ ]1+[ ]2+[ ]3+[ ]4+[ ]  Lower Extremity Pulses: Right[ ] Left[ ]Varicosities[ ]  SKIN :WNL[ x] Normal exam to inspection & palpation. Other:                                                          LABS:                        10.6   7.00  )-----------( 235      ( 09 Oct 2024 05:40 )             32.1     10-09    139  |  106  |  19  ----------------------------<  132[H]  4.3   |  25  |  1.1    Ca    8.5      09 Oct 2024 05:40  Mg     1.9     10-09        Urinalysis Basic - ( 09 Oct 2024 05:40 )    Color: x / Appearance: x / SG: x / pH: x  Gluc: 132 mg/dL / Ketone: x  / Bili: x / Urobili: x   Blood: x / Protein: x / Nitrite: x   Leuk Esterase: x / RBC: x / WBC x   Sq Epi: x / Non Sq Epi: x / Bacteria: x          Cardiac Cath:   LM: Mild disease    LAD: Prox LAD 80% stenosis  D1: 80% stenosis    CX: Prox Cx 70% stenosis  OM1: 70% stenosis    RCA: 100%  fills vial collaterals from left system  RPDA: mild disease, fills vial collaterals from left system    TTE / ASTRID: < from: TTE Echo Complete w/o Contrast w/ Doppler (10.05.24 @ 12:34) >  Summary:   1. Low-normal global left ventricular systolicfunction. Left   ventricular ejection fraction, by visual estimation, is 50 to 55%.   Moderate (Grade 2) diastolic dysfunction. No regional wall motion   abnormalities. Increased LV wall thickness (IVSd 1.4cm) with normal mass   index.   2. Normal right ventricular size and function.   3. Mildly enlarged left atrium.   4. No hemodynamically significant valvular disease.   5. Adequate TR velocity was not obtained to accurately assess RVSP.   6. There is no evidence of pericardial effusion.    PHYSICIAN INTERPRETATION:  Left Ventricle: The left ventricular internal cavity size is normal. Left   ventricular wall thickness is increased. Increased relative wall   thickness with normal mass index consistent with left ventricular   concentric remodeling. Global LV systolic function was low-normal. Left   ventricular ejection fraction, by visual estimation, is 50 to 55%.   Spectral Doppler shows pseudonormal pattern of left ventricular   myocardial filling (Grade II diastolic dysfunction). Elevated mean left   atrial pressure.  Right Ventricle: Normal right ventricular size and function.  Left Atrium: Mildly enlarged left atrium. LA volume Index is 38.0 ml/m²   ml/m2.  Right Atrium: Normal right atrial size.  Pericardium: There is no evidenceof pericardial effusion.  Mitral Valve: No evidence of mitral valve stenosis. Trace mitral valve   regurgitation is seen.  Tricuspid Valve: Structurally normal tricuspid valve, with normal leaflet   excursion. Trivial tricuspid regurgitation is visualized. Adequate TR   velocity was not obtained to accurately assess RVSP.  Aortic Valve: The aortic valve is trileaflet. No aortic stenosis. No   evidence of aortic valve regurgitation is seen.  Pulmonic Valve: The pulmonic valve was not well visualized.  Aorta: The aortic root and ascending aorta are structurally normal, with   no evidence of dilitation.  Venous: The inferior vena cava was normal sized, with respiratory size   variation greater than 50%.    < end of copied text >      STS Score:   Procedure Type: Isolated CABG  Perioperative Outcome	Estimate %  Operative Mortality	1.56%  Morbidity & Mortality	6.53%  Stroke	1.05%  Renal Failure	1.57%  Reoperation	1.67%  Prolonged Ventilation	3.19%  Deep Sternal Wound Infection	0.149%  Long Hospital Stay (>14 days)	4.9%  Short Hospital Stay (<6 days)*	36.6%    Impression:  CAD [ x]  Valvular  disease [ ]   Aortic Disease [ ]   ARIELLE: Yes[x ] No [ ]   CKD Stage I [ ] , Stage II [ ] , Stage III [ ], Stage IV [ ]   Anemia: Yes [ ], No [ ]  Diabetes :Yes [x ], No [ ]  Acute MI: Yes [ x], No [ ]   Heart Failure: Yes [ ] , No [ ] HFpEF [ ], HFrEF [ ]      Assessment/ Plan: 84y Male with 3VCAD pre-op for CABG  -Case and plan discussed with CT surgeon Dr. Portillo/Pedro Pablo/Bobbi. Initial STS risk assessed and discussed with patient. Evaluation by full heart team pending. Attending note to follow. Pre-op for:     Recommendations:  [x] hold Plavix  [x] hold ACEI/ARB/CCB 24 hours prior to planned procedure   [x] LUE/RUE precaution for possible radial artery harvest      Labs:  [x] CBC  [x] CMP  [x] PT/INR/PTT  [] BNP  [x] HgA1c  [x] Type and screen  [] Urinalysis  [] MRSA      Diagnostic studies  [x] CT Chest without contrast   [x] PFT: Simple PFT [x ]  Full [ ]        Consultations/Evaluations     [x] PT consult                 Surgeon: Dr. Portillo/Pedro Pablo/Bobbi    Consult requesting by: Dr Griffith    HISTORY OF PRESENT ILLNESS:  Adriano Grove is an 83 yo M with PMHx of HTN, HLD, DM, brought to ED for fall with possible syncope. In the ED patients ECG showed new TWI in inferolateral leads, elevated troponin (370> 344 > 280), elevated proBNP 1655, Creatine Kinase >6000, ARIELLE on admission Cr 2.6. Patient was admitted to [facility] for syncope wkup, cardiology consulted, started on ASA 81mg, TTE showed EF 50-55%, G2DD.  Left heart cath was deferred till kidney function improved likely 2/2 to rhabdomyolysis and possible UTI, He received IV fluids, and was started on antibiotics (course completed) subsequently his kidney function improved Cr: 2.6 -> 2.4 -> 1.4 -> 1.1. EP consulted recommended orthostatics which were negative & TSH wnl. Neurology was consulted for seizure evaluation- MRI head done did not show any acute findings and EEG was normal. After improvement in kidney function, 10/8, patent underwent lexiscan stress test which showed Fixed Defect in the inferior wall the left ventricle with incomplete thickening suggesting prior injury. Decision was made to transfer patient to cardiac telemetry for continued management and ischemic work up with [facility]. On 10/9/24 patient undernoted C which revealed 3vCAD. Ct Surgery consulted for CABG evaluation.           PAST MEDICAL & SURGICAL HISTORY:  DM (diabetes mellitus)      HTN (hypertension)      HLD (hyperlipidemia)      H/O knee surgery    cholecystectomy         NYHA functional class    [ ] Class I (no limitation) [x ] Class II (slight limitation) [ ] Class III (marked limitation) [ ] Class IV (symptoms at rest)    Citizen of [place] Cardi[employer] grading of angina pectoris  [ ]  Class I	Angina only during strenuous or prolonged physical activity  [x ]  Class II	Slight limitation, with angina only during vigorous physical activity  [ ]  Class III	Symptoms with everyday living activities, ie, moderate limitation  [ ]  Class IV	Inability to perform any activity without angina or angina at rest, ie, severe limitation    MEDICATIONS  (STANDING):  aspirin  chewable 81 milliGRAM(s) Oral daily  atorvastatin 20 milliGRAM(s) Oral at bedtime  dextrose 5%. 1000 milliLiter(s) (50 mL/Hr) IV Continuous <Continuous>  dextrose 5%. 1000 milliLiter(s) (100 mL/Hr) IV Continuous <Continuous>  dextrose 50% Injectable 25 Gram(s) IV Push once  dextrose 50% Injectable 25 Gram(s) IV Push once  dextrose 50% Injectable 12.5 Gram(s) IV Push once  glucagon  Injectable 1 milliGRAM(s) IntraMuscular once  insulin glargine Injectable (LANTUS) 9 Unit(s) SubCutaneous at bedtime  insulin lispro (ADMELOG) corrective regimen sliding scale   SubCutaneous three times a day before meals  insulin lispro Injectable (ADMELOG) 3 Unit(s) SubCutaneous three times a day before meals  isosorbide   mononitrate ER Tablet (IMDUR) 15 milliGRAM(s) Oral once  lactated ringers. 1000 milliLiter(s) (75 mL/Hr) IV Continuous <Continuous>  lisinopril 10 milliGRAM(s) Oral daily  regadenoson Injectable 0.4 milliGRAM(s) IV Push once    MEDICATIONS  (PRN):  dextrose Oral Gel 15 Gram(s) Oral once PRN Blood Glucose LESS THAN 70 milliGRAM(s)/deciliter    Antiplatelet therapy:                           Last dose/amt:    Home Medications:  ALLOPURINOL 100 MG TABLET: 1 tab(s) orally once a day (05 Oct 2024 04:51)  ATORVASTATIN 40 MG TABLET: 1 tab(s) orally once a day (05 Oct 2024 04:51)  dapagliflozin 5 mg oral tablet: 1 tab(s) orally once a day (05 Oct 2024 13:01)  ezetimibe 10 mg oral tablet: 1 tab(s) orally once a day (05 Oct 2024 04:51)  JANUMET 50/500 MG TABLET MS: TAKE 1 TABLET EVERY DAY (05 Oct 2024 04:51)  RAMIPRIL 10 MG CAPS: TAKE 1 CAPSULE EVERY DAY (05 Oct 2024 04:51)      Allergies    No Known Allergies    Intolerances        SOCIAL HISTORY:  Smoker: [ ] Yes  [ x] No        PACK YEARS:                         WHEN QUIT?  ETOH use: [ ] Yes  [x ] No              FREQUENCY / QUANTITY:  Illicit Drug use:  [ ] Yes  [x ] No  Occupation: retired sanitation worked  Lives with: wife, has 3 sons  Assisted device use: none  5 meter walk test: 1____sec, 2____sec, 3___sec, unable to do just cath  FAMILY HISTORY:  No pertinent family history in first degree relatives        Review of Systems  CONSTITUTIONAL:  Fevers[ ] chills[ ] sweats[ ] fatigue[ ] weight loss[ ] weight gain [ ]                                     NEGATIVE [X ]   NEURO:  paresthesias[ ] seizures [ ]  syncope [ ]  confusion [ ]                                                                                NEGATIVE[ X]   EYES: glasses[ ]  blurry vision[ ]  discharge[ ] pain[ ] glaucoma [ ]                                                                          NEGATIVE[X ]   ENMT:  difficulty hearing [ ]  vertigo[ ]  dysphagia[ ] epistaxis[ ] recent dental work [ ]                                    NEGATIVE[ X]   CV:  chest pain[ ] palpitations[ ] PINEDA [ ] diaphoresis [ ]                                                                                           NEGATIVE[ X]   RESPIRATORY:  wheezing[ ] SOB[ ] cough [ ] sputum[ ] hemoptysis[ ]                                                                  NEGATIVE[ ]   GI:  nausea[ ]  vomiting [ ]  diarrhea[ ] constipation [ ] melena [ ]                                                                         NEGATIVE[ X]   : hematuria[ ]  dysuria[ ] urgency[ ] incontinence[ ]                                                                                            NEGATIVE[ X]   MUSKULOSKELETAL:  arthritis[ ]  joint swelling [ ] muscle weakness [ ] Hx vein stripping [ ]                             NEGATIVE[X ]   SKIN/BREAST:  rash[ ] itching [ ]  hair loss[ ] masses[ ]                                                                                              NEGATIVE[ X]   PSYCH:  dementia [ ] depression [ ] anxiety[ ]                                                                                                               NEGATIVE[X ]   HEME/LYMPH:  bruises easily[ ] enlarged lymph nodes[ ] tender lymph nodes[ ]                                               NEGATIVE[ X]   ENDOCRINE:  cold intolerance[ ] heat intolerance[ ] polydipsia[ ]                                                                          NEGATIVE[ X]     PHYSICAL EXAM  Vital Signs Last 24 Hrs  T(C): 36.3 (09 Oct 2024 07:51), Max: 36.9 (08 Oct 2024 22:55)  T(F): 97.4 (09 Oct 2024 07:51), Max: 98.4 (08 Oct 2024 22:55)  HR: 56 (09 Oct 2024 17:14) (49 - 59)  BP: 173/85 (09 Oct 2024 17:14) (132/60 - 173/96)  BP(mean): 99 (09 Oct 2024 11:32) (87 - 128)  RR: 14 (09 Oct 2024 17:14) (14 - 20)  SpO2: 97% (09 Oct 2024 17:14) (95% - 97%)    Parameters below as of 09 Oct 2024 17:14  Patient On (Oxygen Delivery Method): room air      Right arm bp:                                 Left arm bp;    CONSTITUTIONAL:  WNL[ x]   Neuro: WNL [x ] Normal exam oriented to person/place & time with no focal motor or sensory  deficits. Other                     Eyes:    WNL [x ] Normal exam of conjunctiva & lids, pupils equally reactive. Other     ENT:     WNL [x ] Normal exam of nasal/oral mucosa with absence of cyanosis. Other  Neck:   WNL [ x] Normal exam of jugular veins, trachea & thyroid. Other  Chest:  WNL [ x] Normal lung exam with good air movement absence of wheezes, rales, or rhonchi: Other                                                                                CV:  Auscultation: normal [x ] S3[ ] S4[ ] Irregular [ ] Rub[ ] Clicks[ ]    Murmurs none:[x ]systolic [ ]  diastolic [ ] holosystolic [ ]  Carotids: No Bruits[x ] Other                  Abdominal Aorta: normal [x ] nonpalpable[ ]Other                                                                                      GI: WNL[x] Normal exam of abdomen, liver & spleen with no noted masses or tenderness. Other                                                                                                        Extremities: WNL[x ] Normal no evidence of cyanosis or deformity Edema: none[ ]trace[ ]1+[ ]2+[ ]3+[ ]4+[ ]  Lower Extremity Pulses: Right[ ] Left[ ]Varicosities[ ]  SKIN :WNL[ x] Normal exam to inspection & palpation. Other:                                                          LABS:                        10.6   7.00  )-----------( 235      ( 09 Oct 2024 05:40 )             32.1     10-09    139  |  106  |  19  ----------------------------<  132[H]  4.3   |  25  |  1.1    Ca    8.5      09 Oct 2024 05:40  Mg     1.9     10-09        Urinalysis Basic - ( 09 Oct 2024 05:40 )    Color: x / Appearance: x / SG: x / pH: x  Gluc: 132 mg/dL / Ketone: x  / Bili: x / Urobili: x   Blood: x / Protein: x / Nitrite: x   Leuk Esterase: x / RBC: x / WBC x   Sq Epi: x / Non Sq Epi: x / Bacteria: x          Cardiac Cath:   LM: Mild disease    LAD: Prox LAD 80% stenosis  D1: 80% stenosis    CX: Prox Cx 70% stenosis  OM1: 70% stenosis    RCA: 100%  fills vial collaterals from left system  RPDA: mild disease, fills vial collaterals from left system    TTE / ASTRID: < from: TTE Echo Complete w/o Contrast w/ Doppler (10.05.24 @ 12:34) >  Summary:   1. Low-normal global left ventricular systolicfunction. Left   ventricular ejection fraction, by visual estimation, is 50 to 55%.   Moderate (Grade 2) diastolic dysfunction. No regional wall motion   abnormalities. Increased LV wall thickness (IVSd 1.4cm) with normal mass   index.   2. Normal right ventricular size and function.   3. Mildly enlarged left atrium.   4. No hemodynamically significant valvular disease.   5. Adequate TR velocity was not obtained to accurately assess RVSP.   6. There is no evidence of pericardial effusion.    PHYSICIAN INTERPRETATION:  Left Ventricle: The left ventricular internal cavity size is normal. Left   ventricular wall thickness is increased. Increased relative wall   thickness with normal mass index consistent with left ventricular   concentric remodeling. Global LV systolic function was low-normal. Left   ventricular ejection fraction, by visual estimation, is 50 to 55%.   Spectral Doppler shows pseudonormal pattern of left ventricular   myocardial filling (Grade II diastolic dysfunction). Elevated mean left   atrial pressure.  Right Ventricle: Normal right ventricular size and function.  Left Atrium: Mildly enlarged left atrium. LA volume Index is 38.0 ml/m²   ml/m2.  Right Atrium: Normal right atrial size.  Pericardium: There is no evidenceof pericardial effusion.  Mitral Valve: No evidence of mitral valve stenosis. Trace mitral valve   regurgitation is seen.  Tricuspid Valve: Structurally normal tricuspid valve, with normal leaflet   excursion. Trivial tricuspid regurgitation is visualized. Adequate TR   velocity was not obtained to accurately assess RVSP.  Aortic Valve: The aortic valve is trileaflet. No aortic stenosis. No   evidence of aortic valve regurgitation is seen.  Pulmonic Valve: The pulmonic valve was not well visualized.  Aorta: The aortic root and ascending aorta are structurally normal, with   no evidence of dilitation.  Venous: The inferior vena cava was normal sized, with respiratory size   variation greater than 50%.    < end of copied text >      STS Score:   Procedure Type: Isolated CABG  Perioperative Outcome	Estimate %  Operative Mortality	1.56%  Morbidity & Mortality	6.53%  Stroke	1.05%  Renal Failure	1.57%  Reoperation	1.67%  Prolonged Ventilation	3.19%  Deep Sternal Wound Infection	0.149%  Long Hospital Stay (>14 days)	4.9%  Short Hospital Stay (<6 days)*	36.6%    Impression:  CAD [ x]  Valvular  disease [ ]   Aortic Disease [ ]   ARIELLE: Yes[x ] No [ ]   CKD Stage I [ ] , Stage II [ ] , Stage III [ ], Stage IV [ ]   Anemia: Yes [ ], No [ ]  Diabetes :Yes [x ], No [ ]  Acute MI: Yes [ x], No [ ]   Heart Failure: Yes [ ] , No [ ] HFpEF [ ], HFrEF [ ]      Assessment/ Plan: 84y Male with 3VCAD pre-op for CABG  -Case and plan discussed with CT surgeon Dr. Portillo/Pedro Pablo/Bobbi. Initial STS risk assessed and discussed with patient. Evaluation by full heart team pending. Attending note to follow. Pre-op for:     Recommendations:  [x] hold Plavix  [x] hold ACEI/ARB/CCB 24 hours prior to planned procedure   [x] LUE/RUE precaution for possible radial artery harvest      Labs:  [x] CBC  [x] CMP  [x] PT/INR/PTT  [] BNP  [x] HgA1c  [x] Type and screen  [] Urinalysis  [] MRSA      Diagnostic studies  [x] CT Chest without contrast   [x] PFT: Simple PFT [x ]  Full [ ]        Consultations/Evaluations     [x] PT consult      CTS Attending  -------------------    Patient interviewed and examined as noted.  Angiogram and catheterization data reviewed.  Case discussed with referring cardiologist.  Patient referred for CABG in the setting of multi-vessel disease, preserved LVEF and poor suitability for PCI.    Procedure explained in detail to the patient, including risks, benefits and alternaties, and the patient agreed to proceed with surgery.  Consent forms signed andin the chart.  Arrangements being made for preop testing to be completed.    -FMR

## 2024-10-09 NOTE — CHART NOTE - NSCHARTNOTEFT_GEN_A_CORE
PRE-OP DIAGNOSIS:    NSTEMI    PROCEDURE:     [x] Coronary Angiogram     [x] LHC     [] LVG     [] RHC     [] Intervention (see below)         PHYSICIAN:  Dr Griffith    ASSISTANT: Dr SOHA Robison     PROCEDURE DESCRIPTION:     Consent:      [x] Patient     [] Family Member     []  Used        Anesthesia:     [] General     [x] Sedation     [x] Local        Access & Closure:     [x] 6 Fr Right Radial Artery (D stat closure)     [] Fr Femoral Artery     [] Fr Femoral Vein     [] Fr Brachial Vein       IV Contrast: 45 mL        Intervention: None      Implants: None       FINDINGS:     Coronary Dominance: Right      LM: Mild disease    LAD: Prox LAD 80% stenosis  D1: 80% stenosis    CX: Prox Cx 70% stenosis  OM1: 70% stenosis    RCA: 100%  fills vial collaterals from left system  RPDA: mild disease, fills vial collaterals from left system       LVEDP: 8 mmHg        ESTIMATED BLOOD LOSS: < 10 mL        CONDITION:     [x] Good     [] Fair     [] Critical        SPECIMEN REMOVED: N/A       POST-OP DIAGNOSIS:      [x] 3 Vessel Coronary Artery Disease. LAD/D1 stenosis, LCx/OM1 stenosis and RCA  supplied by collaterals. Syntax Score I = 35       PLAN OF CARE:     [x] Return to In-patient bed     [x] CT Surgery Consult for CABG    [x] Medications: Aspirin 81 mg qd, Lipitor 20mg qd, and Lisinopril 10mg qd.     [x] IV Fluids: IV NS @ 150cc/hr for 2 hours PRE-OP DIAGNOSIS:    NSTEMI    PROCEDURE:     [x] Coronary Angiogram     [x] LHC     [] LVG     [] RHC     [] Intervention (see below)         PHYSICIAN:  Dr Griffith    ASSISTANT: Dr SOHA Robison     PROCEDURE DESCRIPTION:     Consent:      [x] Patient     [] Family Member     []  Used        Anesthesia:     [] General     [x] Sedation     [x] Local        Access & Closure:     [x] 6 Fr Right Radial Artery (D stat closure)     [] Fr Femoral Artery     [] Fr Femoral Vein     [] Fr Brachial Vein       IV Contrast: 45 mL        Intervention: None      Implants: None       FINDINGS:     Coronary Dominance: Right      LM: Mild disease    LAD: Prox LAD 80% stenosis  D1: 90% stenosis    CX: Prox Cx 70% stenosis  OM1: 90% stenosis    RCA: 100%  fills vial collaterals from left system  RPDA: mild disease, fills vial collaterals from left system       LVEDP: 8 mmHg        ESTIMATED BLOOD LOSS: < 10 mL        CONDITION:     [x] Good     [] Fair     [] Critical        SPECIMEN REMOVED: N/A       POST-OP DIAGNOSIS:      [x] 3 Vessel Coronary Artery Disease. LAD/D1 stenosis, LCx/OM1 stenosis and RCA  supplied by collaterals. Syntax Score I = 35       PLAN OF CARE:     [x] Return to In-patient bed     [x] CT Surgery Consult for CABG    [x] Medications: Aspirin 81 mg qd, Lipitor 20mg qd, and Lisinopril 10mg qd.     [x] IV Fluids: IV NS @ 150cc/hr for 4 hours

## 2024-10-10 ENCOUNTER — TRANSCRIPTION ENCOUNTER (OUTPATIENT)
Age: 84
End: 2024-10-10

## 2024-10-10 LAB
ANION GAP SERPL CALC-SCNC: 11 MMOL/L — SIGNIFICANT CHANGE UP (ref 7–14)
APPEARANCE UR: CLEAR — SIGNIFICANT CHANGE UP
BASOPHILS # BLD AUTO: 0.04 K/UL — SIGNIFICANT CHANGE UP (ref 0–0.2)
BASOPHILS NFR BLD AUTO: 0.6 % — SIGNIFICANT CHANGE UP (ref 0–1)
BILIRUB UR-MCNC: NEGATIVE — SIGNIFICANT CHANGE UP
BUN SERPL-MCNC: 21 MG/DL — HIGH (ref 10–20)
CALCIUM SERPL-MCNC: 8.7 MG/DL — SIGNIFICANT CHANGE UP (ref 8.4–10.5)
CHLORIDE SERPL-SCNC: 107 MMOL/L — SIGNIFICANT CHANGE UP (ref 98–110)
CO2 SERPL-SCNC: 24 MMOL/L — SIGNIFICANT CHANGE UP (ref 17–32)
COLOR SPEC: YELLOW — SIGNIFICANT CHANGE UP
CREAT SERPL-MCNC: 1.1 MG/DL — SIGNIFICANT CHANGE UP (ref 0.7–1.5)
CULTURE RESULTS: SIGNIFICANT CHANGE UP
CULTURE RESULTS: SIGNIFICANT CHANGE UP
DIFF PNL FLD: NEGATIVE — SIGNIFICANT CHANGE UP
EGFR: 67 ML/MIN/1.73M2 — SIGNIFICANT CHANGE UP
EOSINOPHIL # BLD AUTO: 0.21 K/UL — SIGNIFICANT CHANGE UP (ref 0–0.7)
EOSINOPHIL NFR BLD AUTO: 3 % — SIGNIFICANT CHANGE UP (ref 0–8)
GLUCOSE BLDC GLUCOMTR-MCNC: 124 MG/DL — HIGH (ref 70–99)
GLUCOSE BLDC GLUCOMTR-MCNC: 149 MG/DL — HIGH (ref 70–99)
GLUCOSE BLDC GLUCOMTR-MCNC: 177 MG/DL — HIGH (ref 70–99)
GLUCOSE BLDC GLUCOMTR-MCNC: 229 MG/DL — HIGH (ref 70–99)
GLUCOSE SERPL-MCNC: 132 MG/DL — HIGH (ref 70–99)
GLUCOSE UR QL: NEGATIVE MG/DL — SIGNIFICANT CHANGE UP
HCT VFR BLD CALC: 29.2 % — LOW (ref 42–52)
HGB BLD-MCNC: 9.8 G/DL — LOW (ref 14–18)
IMM GRANULOCYTES NFR BLD AUTO: 0.7 % — HIGH (ref 0.1–0.3)
IRON SATN MFR SERPL: 57 % — HIGH (ref 15–50)
IRON SATN MFR SERPL: 96 UG/DL — SIGNIFICANT CHANGE UP (ref 35–150)
KETONES UR-MCNC: NEGATIVE MG/DL — SIGNIFICANT CHANGE UP
LDH SERPL L TO P-CCNC: 229 U/L — SIGNIFICANT CHANGE UP (ref 50–242)
LEUKOCYTE ESTERASE UR-ACNC: NEGATIVE — SIGNIFICANT CHANGE UP
LYMPHOCYTES # BLD AUTO: 1.52 K/UL — SIGNIFICANT CHANGE UP (ref 1.2–3.4)
LYMPHOCYTES # BLD AUTO: 21.6 % — SIGNIFICANT CHANGE UP (ref 20.5–51.1)
MAGNESIUM SERPL-MCNC: 1.7 MG/DL — LOW (ref 1.8–2.4)
MCHC RBC-ENTMCNC: 32.6 PG — HIGH (ref 27–31)
MCHC RBC-ENTMCNC: 33.6 G/DL — SIGNIFICANT CHANGE UP (ref 32–37)
MCV RBC AUTO: 97 FL — HIGH (ref 80–94)
MONOCYTES # BLD AUTO: 0.59 K/UL — SIGNIFICANT CHANGE UP (ref 0.1–0.6)
MONOCYTES NFR BLD AUTO: 8.4 % — SIGNIFICANT CHANGE UP (ref 1.7–9.3)
MRSA PCR RESULT.: SIGNIFICANT CHANGE UP
NEUTROPHILS # BLD AUTO: 4.64 K/UL — SIGNIFICANT CHANGE UP (ref 1.4–6.5)
NEUTROPHILS NFR BLD AUTO: 65.7 % — SIGNIFICANT CHANGE UP (ref 42.2–75.2)
NITRITE UR-MCNC: NEGATIVE — SIGNIFICANT CHANGE UP
NRBC # BLD: 0 /100 WBCS — SIGNIFICANT CHANGE UP (ref 0–0)
PH UR: 6 — SIGNIFICANT CHANGE UP (ref 5–8)
PLATELET # BLD AUTO: 251 K/UL — SIGNIFICANT CHANGE UP (ref 130–400)
PMV BLD: 9.8 FL — SIGNIFICANT CHANGE UP (ref 7.4–10.4)
POTASSIUM SERPL-MCNC: 4.5 MMOL/L — SIGNIFICANT CHANGE UP (ref 3.5–5)
POTASSIUM SERPL-SCNC: 4.5 MMOL/L — SIGNIFICANT CHANGE UP (ref 3.5–5)
PROT UR-MCNC: 30 MG/DL
RBC # BLD: 3.01 M/UL — LOW (ref 4.7–6.1)
RBC # FLD: 12 % — SIGNIFICANT CHANGE UP (ref 11.5–14.5)
S AUREUS DNA NOSE QL NAA+PROBE: SIGNIFICANT CHANGE UP
SODIUM SERPL-SCNC: 142 MMOL/L — SIGNIFICANT CHANGE UP (ref 135–146)
SP GR SPEC: 1.02 — SIGNIFICANT CHANGE UP (ref 1–1.03)
SPECIMEN SOURCE: SIGNIFICANT CHANGE UP
SPECIMEN SOURCE: SIGNIFICANT CHANGE UP
TIBC SERPL-MCNC: 168 UG/DL — LOW (ref 220–430)
UIBC SERPL-MCNC: 72 UG/DL — LOW (ref 110–370)
UROBILINOGEN FLD QL: 1 MG/DL — SIGNIFICANT CHANGE UP (ref 0.2–1)
WBC # BLD: 7.05 K/UL — SIGNIFICANT CHANGE UP (ref 4.8–10.8)
WBC # FLD AUTO: 7.05 K/UL — SIGNIFICANT CHANGE UP (ref 4.8–10.8)

## 2024-10-10 PROCEDURE — 99232 SBSQ HOSP IP/OBS MODERATE 35: CPT

## 2024-10-10 RX ORDER — MAGNESIUM SULFATE 500 MG/ML
2 VIAL (ML) INJECTION ONCE
Refills: 0 | Status: COMPLETED | OUTPATIENT
Start: 2024-10-10 | End: 2024-10-10

## 2024-10-10 RX ORDER — ATORVASTATIN CALCIUM 10 MG/1
40 TABLET, FILM COATED ORAL AT BEDTIME
Refills: 0 | Status: DISCONTINUED | OUTPATIENT
Start: 2024-10-10 | End: 2024-10-11

## 2024-10-10 RX ORDER — CHLORHEXIDINE GLUCONATE ORAL RINSE 1.2 MG/ML
1 SOLUTION DENTAL ONCE
Refills: 0 | Status: COMPLETED | OUTPATIENT
Start: 2024-10-10 | End: 2024-10-10

## 2024-10-10 RX ORDER — FAMOTIDINE 40 MG
20 TABLET ORAL EVERY 12 HOURS
Refills: 0 | Status: DISCONTINUED | OUTPATIENT
Start: 2024-10-10 | End: 2024-10-11

## 2024-10-10 RX ORDER — CHLORHEXIDINE GLUCONATE ORAL RINSE 1.2 MG/ML
1 SOLUTION DENTAL ONCE
Refills: 0 | Status: COMPLETED | OUTPATIENT
Start: 2024-10-11 | End: 2024-10-11

## 2024-10-10 RX ORDER — CHLORHEXIDINE GLUCONATE ORAL RINSE 1.2 MG/ML
15 SOLUTION DENTAL ONCE
Refills: 0 | Status: COMPLETED | OUTPATIENT
Start: 2024-10-11 | End: 2024-10-11

## 2024-10-10 RX ORDER — AMLODIPINE BESYLATE 5 MG
5 TABLET ORAL DAILY
Refills: 0 | Status: DISCONTINUED | OUTPATIENT
Start: 2024-10-10 | End: 2024-10-11

## 2024-10-10 RX ORDER — CHLORHEXIDINE GLUCONATE ORAL RINSE 1.2 MG/ML
1 SOLUTION DENTAL
Refills: 0 | Status: DISCONTINUED | OUTPATIENT
Start: 2024-10-10 | End: 2024-10-11

## 2024-10-10 RX ORDER — METOPROLOL TARTRATE 50 MG
12.5 TABLET ORAL ONCE
Refills: 0 | Status: COMPLETED | OUTPATIENT
Start: 2024-10-10 | End: 2024-10-10

## 2024-10-10 RX ADMIN — Medication 2: at 11:15

## 2024-10-10 RX ADMIN — Medication 5 MILLIGRAM(S): at 11:44

## 2024-10-10 RX ADMIN — Medication 3 UNIT(S): at 07:44

## 2024-10-10 RX ADMIN — CHLORHEXIDINE GLUCONATE ORAL RINSE 1 APPLICATION(S): 1.2 SOLUTION DENTAL at 05:44

## 2024-10-10 RX ADMIN — Medication 10 MILLIGRAM(S): at 05:41

## 2024-10-10 RX ADMIN — ATORVASTATIN CALCIUM 40 MILLIGRAM(S): 10 TABLET, FILM COATED ORAL at 21:28

## 2024-10-10 RX ADMIN — CHLORHEXIDINE GLUCONATE ORAL RINSE 1 APPLICATION(S): 1.2 SOLUTION DENTAL at 22:50

## 2024-10-10 RX ADMIN — Medication 20 MILLIGRAM(S): at 17:16

## 2024-10-10 RX ADMIN — CHLORHEXIDINE GLUCONATE ORAL RINSE 1 APPLICATION(S): 1.2 SOLUTION DENTAL at 19:32

## 2024-10-10 RX ADMIN — Medication 3 UNIT(S): at 16:23

## 2024-10-10 RX ADMIN — Medication 81 MILLIGRAM(S): at 11:14

## 2024-10-10 RX ADMIN — Medication 5000 UNIT(S): at 21:28

## 2024-10-10 RX ADMIN — Medication 25 GRAM(S): at 08:57

## 2024-10-10 RX ADMIN — Medication 3 UNIT(S): at 11:16

## 2024-10-10 NOTE — PROGRESS NOTE ADULT - SUBJECTIVE AND OBJECTIVE BOX
PLANNED OPERATIVE PROCEDURE(s): CABG              HD #                       SURGEON(s): Pedro Pablo    HPI:    Adriano Grove is an 83 yo M with PMHx of HTN, HLD, DM, brought to ED for fall with possible syncope. In the ED patients ECG showed new TWI in inferolateral leads, elevated troponin (370> 344 > 280), elevated proBNP 1655, Creatine Kinase >6000, ARIELLE on admission Cr 2.6. Patient was admitted to [facility] for syncope wkup, cardiology consulted, started on ASA 81mg, TTE showed EF 50-55%, G2DD.  Left heart cath was deferred till kidney function improved likely 2/2 to rhabdomyolysis and possible UTI, He received IV fluids, and was started on antibiotics (course completed) subsequently his kidney function improved Cr: 2.6 -> 2.4 -> 1.4 -> 1.1. EP consulted recommended orthostatics which were negative & TSH wnl. Neurology was consulted for seizure evaluation- MRI head done did not show any acute findings and EEG was normal. After improvement in kidney function, 10/8, patent underwent lexiscan stress test which showed Fixed Defect in the inferior wall the left ventricle with incomplete thickening suggesting prior injury. Decision was made to transfer patient to cardiac telemetry for continued management and ischemic work up with [facility]. On 10/9/24 patient undernoted C which revealed 3vCAD. Ct Surgery consulted for CABG evaluation.         SUBJECTIVE ASSESSMENT:84y Male patient seen and examined at bedside.    Vital Signs Last 24 Hrs  T(F): 97.9 (10 Oct 2024 07:25), Max: 97.9 (09 Oct 2024 20:28)  HR: 67 (10 Oct 2024 07:25) (49 - 68)  BP: 148/69 (10 Oct 2024 07:25) (136/71 - 173/85)  BP(mean): 99 (10 Oct 2024 07:25) (93 - 100)  RR: 18 (10 Oct 2024 07:25) (14 - 20)  RUE SBP:                LUE SBP:      I&O's Detail    09 Oct 2024 07:01  -  10 Oct 2024 07:00  --------------------------------------------------------  IN:    Lactated Ringers: 675 mL  Total IN: 675 mL    OUT:    Voided (mL): 400 mL  Total OUT: 400 mL        Net: I&O's Detail    08 Oct 2024 07:01  -  09 Oct 2024 07:00  --------------------------------------------------------  Total NET: 515 mL      09 Oct 2024 07:01  -  10 Oct 2024 07:00  --------------------------------------------------------  Total NET: 275 mL        CAPILLARY BLOOD GLUCOSE      POCT Blood Glucose.: 124 mg/dL (10 Oct 2024 07:28)  POCT Blood Glucose.: 168 mg/dL (09 Oct 2024 22:02)  POCT Blood Glucose.: 166 mg/dL (09 Oct 2024 21:26)  POCT Blood Glucose.: 123 mg/dL (09 Oct 2024 11:07)      Allergies    No Known Allergies    Intolerances        MEDICATIONS  (STANDING):  aspirin  chewable 81 milliGRAM(s) Oral daily  atorvastatin 20 milliGRAM(s) Oral at bedtime  chlorhexidine 2% Cloths 1 Application(s) Topical <User Schedule>  dextrose 5%. 1000 milliLiter(s) (100 mL/Hr) IV Continuous <Continuous>  dextrose 5%. 1000 milliLiter(s) (50 mL/Hr) IV Continuous <Continuous>  dextrose 50% Injectable 25 Gram(s) IV Push once  dextrose 50% Injectable 25 Gram(s) IV Push once  dextrose 50% Injectable 12.5 Gram(s) IV Push once  glucagon  Injectable 1 milliGRAM(s) IntraMuscular once  insulin glargine Injectable (LANTUS) 9 Unit(s) SubCutaneous at bedtime  insulin lispro (ADMELOG) corrective regimen sliding scale   SubCutaneous three times a day before meals  insulin lispro Injectable (ADMELOG) 3 Unit(s) SubCutaneous three times a day before meals  lisinopril 10 milliGRAM(s) Oral daily  sodium chloride 0.9%. 1000 milliLiter(s) (150 mL/Hr) IV Continuous <Continuous>    MEDICATIONS  (PRN):  dextrose Oral Gel 15 Gram(s) Oral once PRN Blood Glucose LESS THAN 70 milliGRAM(s)/deciliter    Home Medications:  ALLOPURINOL 100 MG TABLET: 1 tab(s) orally once a day (05 Oct 2024 04:51)  ATORVASTATIN 40 MG TABLET: 1 tab(s) orally once a day (05 Oct 2024 04:51)  dapagliflozin 5 mg oral tablet: 1 tab(s) orally once a day (05 Oct 2024 13:01)  ezetimibe 10 mg oral tablet: 1 tab(s) orally once a day (05 Oct 2024 04:51)  JANUMET 50/500 MG TABLET MS: TAKE 1 TABLET EVERY DAY (05 Oct 2024 04:51)  RAMIPRIL 10 MG CAPS: TAKE 1 CAPSULE EVERY DAY (05 Oct 2024 04:51)      Physical Exam:  General: NAD; A&Ox3  Cardiac: S1/S2, RRR, no murmur, no rubs  Lungs: unlabored respirations, CTA b/l, no wheeze, no rales, no crackles  Abdomen: Soft/NT/ND; positive bowel sounds x 4  Extremities: No edema b/l lower extremities; good capillary refill; no cyanosis; palpable 1+ pedal pulses b/l    BOWEL MOVEMENT:  [] YES [] NO, If No, Timing since last BM Day #        LABS:                        9.8[L]  7.05  )-----------( 251      ( 10 Oct 2024 06:13 )             29.2[L]                        10.6[L]  7.00  )-----------( 235      ( 09 Oct 2024 05:40 )             32.1[L]    10-10    142  |  107  |  21[H]  ----------------------------<  132[H]  4.5   |  24  |  1.1  10-09    139  |  106  |  19  ----------------------------<  132[H]  4.3   |  25  |  1.1    Ca    8.7      10 Oct 2024 06:13  Mg     1.7     10-10        Urinalysis Basic - ( 10 Oct 2024 06:13 )    Color: x / Appearance: x / SG: x / pH: x  Gluc: 132 mg/dL / Ketone: x  / Bili: x / Urobili: x   Blood: x / Protein: x / Nitrite: x   Leuk Esterase: x / RBC: x / WBC x   Sq Epi: x / Non Sq Epi: x / Bacteria: x        A1C with Estimated Average Glucose Result: 7.8 % (10-05-24 @ 10:39)      RADIOLOGY & ADDITIONAL TESTS:  CXR:     EKG:    Carotid Duplex:      PFT's:    Echocardiogram:     Cardiac catheterization:    CT CHEST:      Pharmacologic DVT Prophylaxis: [] YES, []NO: Contraindication:   [] HEPARIN: Dose: XX mg  Q24H    [] LOVENOX: Dose: XX mg  Q24H                 SCD's: YESb/l    GI Prophylaxis: Protonix [], Pepcid []    Pre-op ACEi/ARB/CCB held 24 hours prior to planned procedure: [] Yes, [] NO: indication:  Pre-Op Beta-Blockers: []Yes, []No: contraindication:  Pre-Op Aspirin: []Yes,  []No: contraindication: [] Held for Pre-op cardiac valve surgery with no CAD  Pre-Op Statin: []Yes, []No: contraindication:    Ambulation/Activity Status:  5meter walk test: T1:      s/T2:     s/T3:     s        Cardiac Surgery Risk Factors  CVA and/or carotid/cerebrovascular disease. Yes  No  Explain if Yes  Aortoiliac disease Yes  No  Explain if Yes  Previous MI Yes  No  Explain if Yes  Previous Cardiac Surgery Yes  No  Explain if Yes  Hemodynamics-Unstable or Shock Yes  No  Explain if Yes  Diabetes Yes  No  Explain if Yes  Hepatic Failure Yes  No  Explain if Yes  Renal failure and/or dialysis Yes  No  Explain if Yes  Heart failure-type-present or past Yes  No  Explain if Yes  COPD Yes  No  Explain if Yes  Immune System Deficiency Yes  No  Explain if Yes  Malignant Ventricular Arrhythmia Yes  No  Explain if Yes    STS Score:     Assessment/Plan:  84y Male Pre-op for   - Case and plan discussed with CTU Intensivist and CT Surgeon - Dr. Portillo/Pedro Pablo/Bobbi/Rena. STS risk score assessed and discussed risk with patient. Attending note to follow.  - Continue supportive care.    - Continue DVT/GI prophylaxis  - Incentive Spirometry 10 times an hour  - Continue to advance physical activity as tolerated and continue PT/OT as directed  1. CAD: Continue ASA, statin, BB  2. HTN:   3. A. Fib:   4. COPD/Hypoxia:   5. DM/Glucose Control:        PLANNED OPERATIVE PROCEDURE(s): CABG              HD # 6                      SURGEON(s): Pedro Pablo    HPI:    Adriano Grove is an 83 yo M with PMHx of HTN, HLD, DM, brought to ED for fall with possible syncope. In the ED patients ECG showed new TWI in inferolateral leads, elevated troponin (370> 344 > 280), elevated proBNP 1655, Creatine Kinase >6000, ARIELLE on admission Cr 2.6. Patient was admitted to [facility] for syncope wkup, cardiology consulted, started on ASA 81mg, TTE showed EF 50-55%, G2DD.  Left heart cath was deferred till kidney function improved likely 2/2 to rhabdomyolysis and possible UTI, He received IV fluids, and was started on antibiotics (course completed) subsequently his kidney function improved Cr: 2.6 -> 2.4 -> 1.4 -> 1.1. EP consulted recommended orthostatics which were negative & TSH wnl. Neurology was consulted for seizure evaluation- MRI head done did not show any acute findings and EEG was normal. After improvement in kidney function, 10/8, patent underwent lexiscan stress test which showed Fixed Defect in the inferior wall the left ventricle with incomplete thickening suggesting prior injury. Decision was made to transfer patient to cardiac telemetry for continued management and ischemic work up with [facility]. On 10/9/24 patient undernoted C which revealed 3vCAD. Ct Surgery consulted for CABG evaluation.       SUBJECTIVE ASSESSMENT:84y Male patient seen and examined at bedside. No complaints at this time.     Vital Signs Last 24 Hrs  T(F): 97.9 (10 Oct 2024 07:25), Max: 97.9 (09 Oct 2024 20:28)  HR: 67 (10 Oct 2024 07:25) (49 - 68)  BP: 148/69 (10 Oct 2024 07:25) (136/71 - 173/85)  BP(mean): 99 (10 Oct 2024 07:25) (93 - 100)  RR: 18 (10 Oct 2024 07:25) (14 - 20)  RUE SBP: 131/62 (89)                LUE SBP: 143/65 (94)      I&O's Detail    09 Oct 2024 07:01  -  10 Oct 2024 07:00  --------------------------------------------------------  IN:    Lactated Ringers: 675 mL  Total IN: 675 mL    OUT:    Voided (mL): 400 mL  Total OUT: 400 mL        Net: I&O's Detail    08 Oct 2024 07:01  -  09 Oct 2024 07:00  --------------------------------------------------------  Total NET: 515 mL      09 Oct 2024 07:01  -  10 Oct 2024 07:00  --------------------------------------------------------  Total NET: 275 mL        CAPILLARY BLOOD GLUCOSE      POCT Blood Glucose.: 124 mg/dL (10 Oct 2024 07:28)  POCT Blood Glucose.: 168 mg/dL (09 Oct 2024 22:02)  POCT Blood Glucose.: 166 mg/dL (09 Oct 2024 21:26)  POCT Blood Glucose.: 123 mg/dL (09 Oct 2024 11:07)      Allergies    No Known Allergies    Intolerances        MEDICATIONS  (STANDING):  aspirin  chewable 81 milliGRAM(s) Oral daily  atorvastatin 20 milliGRAM(s) Oral at bedtime  chlorhexidine 2% Cloths 1 Application(s) Topical <User Schedule>  dextrose 5%. 1000 milliLiter(s) (100 mL/Hr) IV Continuous <Continuous>  dextrose 5%. 1000 milliLiter(s) (50 mL/Hr) IV Continuous <Continuous>  dextrose 50% Injectable 25 Gram(s) IV Push once  dextrose 50% Injectable 25 Gram(s) IV Push once  dextrose 50% Injectable 12.5 Gram(s) IV Push once  glucagon  Injectable 1 milliGRAM(s) IntraMuscular once  insulin glargine Injectable (LANTUS) 9 Unit(s) SubCutaneous at bedtime  insulin lispro (ADMELOG) corrective regimen sliding scale   SubCutaneous three times a day before meals  insulin lispro Injectable (ADMELOG) 3 Unit(s) SubCutaneous three times a day before meals  lisinopril 10 milliGRAM(s) Oral daily  sodium chloride 0.9%. 1000 milliLiter(s) (150 mL/Hr) IV Continuous <Continuous>    MEDICATIONS  (PRN):  dextrose Oral Gel 15 Gram(s) Oral once PRN Blood Glucose LESS THAN 70 milliGRAM(s)/deciliter    Home Medications:  ALLOPURINOL 100 MG TABLET: 1 tab(s) orally once a day (05 Oct 2024 04:51)  ATORVASTATIN 40 MG TABLET: 1 tab(s) orally once a day (05 Oct 2024 04:51)  dapagliflozin 5 mg oral tablet: 1 tab(s) orally once a day (05 Oct 2024 13:01)  ezetimibe 10 mg oral tablet: 1 tab(s) orally once a day (05 Oct 2024 04:51)  JANUMET 50/500 MG TABLET MS: TAKE 1 TABLET EVERY DAY (05 Oct 2024 04:51)  RAMIPRIL 10 MG CAPS: TAKE 1 CAPSULE EVERY DAY (05 Oct 2024 04:51)      Physical Exam:  General: NAD; A&Ox3  Cardiac: S1/S2, RRR, no murmur, no rubs  Lungs: unlabored respirations, CTA b/l, no wheeze, no rales, no crackles  Abdomen: Soft/NT/ND; positive bowel sounds x 4  Extremities: No edema b/l lower extremities; good capillary refill; no cyanosis; palpable 1+ pedal pulses b/l    BOWEL MOVEMENT:  [] YES [] NO, If No, Timing since last BM Day #        LABS:                        9.8[L]  7.05  )-----------( 251      ( 10 Oct 2024 06:13 )             29.2[L]                        10.6[L]  7.00  )-----------( 235      ( 09 Oct 2024 05:40 )             32.1[L]    10-10    142  |  107  |  21[H]  ----------------------------<  132[H]  4.5   |  24  |  1.1  10-09    139  |  106  |  19  ----------------------------<  132[H]  4.3   |  25  |  1.1    Ca    8.7      10 Oct 2024 06:13  Mg     1.7     10-10        Urinalysis Basic - ( 10 Oct 2024 06:13 )          A1C with Estimated Average Glucose Result: 7.8 % (10-05-24 @ 10:39)      RADIOLOGY & ADDITIONAL TESTS:  CXR:   < from: Xray Chest 1 View- PORTABLE-Urgent (10.04.24 @ 18:41) >    INTERPRETATION:  Clinical History / Reason for exam: Chest pain    Comparison : Chest radiograph May 22, 2022.    Technique/Positioning: AP.    Findings:    Support devices: None.    Cardiac/mediastinum/hilum: Unremarkable.    Lung parenchyma/Pleura: Within normal limits.    Skeleton/soft tissues: Stable.    Impression:    No radiographic evidence of acute cardiopulmonary disease.      EKG:  < from: 12 Lead ECG (10.04.24 @ 20:21) >  Ventricular Rate 56 BPM    Atrial Rate 56 BPM    P-R Interval 168 ms    QRS Duration 86 ms    Q-T Interval 442 ms    QTC Calculation(Bazett) 426 ms    P Axis 48 degrees    R Axis 95 degrees    T Axis -22 degrees    Diagnosis Line Sinus bradycardia  Rightward axis  T wave abnormality, consider inferolateral ischemia  Abnormal ECG      Carotid Duplex:    < from: VA Duplex Carotid, Bilat (10.05.24 @ 08:46) >  RIGHT:  PROX CCA = 84  DIST CCA = 78  PROX ICA = 65  DIST ICA = 74  ECA = 147  ICA/CCA 0.9    LEFT:  PROX CCA = 102  DIST CCA = 83  PROX ICA = 98  DIST ICA = 54  ECA = 159  ICA/CCA 1.2    Antegrade flow is noted within both vertebral arteries.    IMPRESSION: No significant hemodynamic stenosis of either carotid artery.   Less than 50% stenosis bilaterally with minimal plaque burden.        PFT's: FEV1: 106 predicted, 2.36L    Echocardiogram:   < from: TTE Echo Complete w/o Contrast w/ Doppler (10.05.24 @ 12:34) >  Summary:   1. Low-normal global left ventricular systolicfunction. Left   ventricular ejection fraction, by visual estimation, is 50 to 55%.   Moderate (Grade 2) diastolic dysfunction. No regional wall motion   abnormalities. Increased LV wall thickness (IVSd 1.4cm) with normal mass   index.   2. Normal right ventricular size and function.   3. Mildly enlarged left atrium.   4. No hemodynamically significant valvular disease.   5. Adequate TR velocity was not obtained to accurately assess RVSP.   6. There is no evidence of pericardial effusion.    PHYSICIAN INTERPRETATION:  Left Ventricle: The left ventricular internal cavity size is normal. Left   ventricular wall thickness is increased. Increased relative wall   thickness with normal mass index consistent with left ventricular   concentric remodeling. Global LV systolic function was low-normal. Left   ventricular ejection fraction, by visual estimation, is 50 to 55%.   Spectral Doppler shows pseudonormal pattern of left ventricular   myocardial filling (Grade II diastolic dysfunction). Elevated mean left   atrial pressure.  Right Ventricle: Normal right ventricular size and function.  Left Atrium: Mildly enlarged left atrium. LA volume Index is 38.0 ml/m²   ml/m2.  Right Atrium: Normal right atrial size.  Pericardium: There is no evidenceof pericardial effusion.  Mitral Valve: No evidence of mitral valve stenosis. Trace mitral valve   regurgitation is seen.  Tricuspid Valve: Structurally normal tricuspid valve, with normal leaflet   excursion. Trivial tricuspid regurgitation is visualized. Adequate TR   velocity was not obtained to accurately assess RVSP.  Aortic Valve: The aortic valve is trileaflet. No aortic stenosis. No   evidence of aortic valve regurgitation is seen.  Pulmonic Valve: The pulmonic valve was not well visualized.  Aorta: The aortic root and ascending aorta are structurally normal, with   no evidence of dilitation.  Venous: The inferior vena cava was normal sized, with respiratory size   variation greater than 50%.      Cardiac catheterization:  FINDINGS:     Coronary Dominance: Right      LM: Mild disease    LAD: Prox LAD 80% stenosis  D1: 90% stenosis    CX: Prox Cx 70% stenosis  OM1: 90% stenosis    RCA: 100%  fills vial collaterals from left system  RPDA: mild disease, fills vial collaterals from left system       LVEDP: 8 mmHg     CT CHEST:  < from: CT Angio Chest PE Protocol w/ IV Cont (10.04.24 @ 21:11) >    IMPRESSION:    1. No evidence of acute thoracic or abdominal pathology. No pulmonary   embolus.    2. Findings compatible with urinary bladder infection/cystitis;   correlation with urinalysis suggested.            Pharmacologic DVT Prophylaxis: [X] YES, []NO: Contraindication:   [X] HEPARIN: Dose: 5000u  Q24H    [] LOVENOX: Dose: XX mg  Q24H                 SCD's: YESb/l    GI Prophylaxis: Protonix [], Pepcid [X]    Pre-op ACEi/ARB/CCB held 24 hours prior to planned procedure: [X] Yes, [] NO: indication:  Pre-Op Beta-Blockers: [X]Yes, []No: contraindication:  Pre-Op Aspirin: [X]Yes,  []No: contraindication: [] Held for Pre-op cardiac valve surgery with no CAD  Pre-Op Statin: [X]Yes, []No: contraindication:    Ambulation/Activity Status: ambulate as tolerated  5meter walk test: T1: 6s/T2: 6s/T3: 6s        Cardiac Surgery Risk Factors  CVA and/or carotid/cerebrovascular disease. No  Aortoiliac disease No  Previous MI Yes  Explain if Yes, NSTEMI this admission  Previous Cardiac Surgery No  Hemodynamics-Unstable or Shock No  Diabetes Yes, Explain if Yes: A1c 7.8  Hepatic Failure No  Renal failure and/or dialysis No  Heart failure-type-present or past Yes,  Explain if Yes: Grade II diastolic dysfunction, preserved EF 50-55  COPD No  Immune System Deficiency No  Malignant Ventricular Arrhythmia No      STS Score:     Procedure Type: Isolated CABG  Perioperative Outcome	Estimate %  Operative Mortality	1.87%  Morbidity & Mortality	7.1%  Stroke	0.933%  Renal Failure	1.49%  Reoperation	1.77%  Prolonged Ventilation	3.44%  Deep Sternal Wound Infection	0.153%  Long Hospital Stay (>14 days)	5.46%  Short Hospital Stay (<6 days)*	32.7%      Clinical Summary  Planned Surgery:	Isolated CABG, Urgent, First cardiovascular surgery  Demographics:	84 year old, White, male, 93.2kg, 175.3cm, BMI: 30.3 kg/m²  Insurance/Payor:	Medicare  Lab Values:	Creatinine: 1.1 mg/dL, Hematocrit: 29.2%, WBC Count: 7.05 10³/µL, Platelet Count: 914007 cells/µL  PreOp Medications:	Oral diabetes control  Substance Abuse:	Former smoker, Alcohol use: = 1 drink/week  Risk Factors / Comorbidities:	Diabetes Mellitus , Hypertension, Family Hx of CAD  Cardiac Status:	NYHA Class II, Ejection Fraction = 50%  Coronary Artery Disease:	3 vessels diseased, Proximal LAD Stenosis = 70%, Non-ST Elevation MI, MI: 1 to 7 Days  Valve Disease:	Mild MR        Assessment/Plan:    Adriano Grove is an 83 yo M with PMHx of HTN, HLD, DM, brought to ED for fall with possible syncope. In the ED patients ECG showed new TWI in inferolateral leads, elevated troponin (370> 344 > 280), elevated proBNP 1655, Creatine Kinase >6000, ARIELLE on admission Cr 2.6. Patient was admitted to [facility] for syncope wkup, cardiology consulted, started on ASA 81mg, TTE showed EF 50-55%, G2DD.  Left heart cath was deferred till kidney function improved likely 2/2 to rhabdomyolysis and possible UTI, He received IV fluids, and was started on antibiotics (course completed) subsequently his kidney function improved Cr: 2.6 -> 2.4 -> 1.4 -> 1.1. EP consulted recommended orthostatics which were negative & TSH wnl. Neurology was consulted for seizure evaluation- MRI head done did not show any acute findings and EEG was normal. After improvement in kidney function, 10/8, patent underwent lexiscan stress test which showed Fixed Defect in the inferior wall the left ventricle with incomplete thickening suggesting prior injury. Decision was made to transfer patient to cardiac telemetry for continued management and ischemic work up with [facility]. On 10/9/24 patient undernoted LHC which revealed 3vCAD. Ct Surgery consulted for CABG evaluation.    - Case and plan discussed with CTU Intensivist and CT Surgeon - Dr. Chamorro. STS risk score assessed and discussed risk with patient. Attending note to follow.  - Continue supportive care.    - DVT and GI prophylaxis started  - Incentive Spirometry 10 times an hour  - Continue to advance physical activity as tolerated and continue PT/OT as directed  1. CAD: Continue ASA, statin, start BB if tolerated. Pre-op work-up in progress. Needs MRSA. Anticipate sx tomorrow in AM.   2. Anemia: etiology unknown, full anemic panel ordered. 2u PRBCs to be given before surgery. Please repeat CBC at 20:00, keep Hbg >13  3. Cystitis on admission: (+) leuks, blood, no nitrates. Treated with IV rocephin x 6 days. Please repeat UA before sx.   4. DM/Glucose Control: A1c 7.8, uncontrolled, on lantus, SS, prandial. continue to monitor

## 2024-10-10 NOTE — DISCHARGE NOTE NURSING/CASE MANAGEMENT/SOCIAL WORK - PATIENT PORTAL LINK FT
You can access the FollowMyHealth Patient Portal offered by [facility] by registering at the following website: [email or web address]. By joining [facility]’s FollowMyHealth portal, you will also be able to view your health information using other applications (apps) compatible with our system.

## 2024-10-10 NOTE — DISCHARGE NOTE NURSING/CASE MANAGEMENT/SOCIAL WORK - NSDCPEFALRISK_GEN_ALL_CORE
For information on Fall & Injury Prevention, visit: [email or web address] OR  [email or web address] OR  https://www.cdc.gov/steadi/patient.html

## 2024-10-10 NOTE — PROGRESS NOTE ADULT - NS ATTEND AMEND GEN_ALL_CORE FT
Found to have multivessel disease on [facility], awaiting CABG tomorrow.     Plan:   - Stop lisinopril  - Start amlodipine 5 mg daily   - Send iron studies, LDH, haptoglobin, retics, B12, and folate  - Transfuse 2u  - Stop Lantus as patient is not on insulin at home and will be NPO at [facility]  - Will continue preprandial Lispro 3U given elevated glucose readings  - Last colonoscopy 5 years ago, and patient was told he does not need a repeat colo
Agree with assessment and plan above, unless otherwise documented in my attestation.    Patient presented with syncope in the setting of UTI. He underwent cardiac work up, including NST which was abnormal.  -Plan for [facility] today  -ASA/statin  -Lisinopril for HTN  -EP consulted for possible ILR

## 2024-10-10 NOTE — PROGRESS NOTE ADULT - SUBJECTIVE AND OBJECTIVE BOX
Chief complaint: Patient is a 84y old  Male who presents with a chief complaint of Fall (07 Oct 2024 11:54)    HPI: In brief, Adriano Grove is an 85 yo M with PMHx of HTN, HLD, DM, brought to ED for fall with possible syncope. In the ED patients ECG showed new TWI in inferolateral leads, elevated troponin (370> 344 > 280), elevated proBNP 1655, Creatine Kinase >6000, ARIELLE on admission Cr 2.6. Patient was admitted to [facility] for syncope wkup, cardiology consulted, started on ASA 81mg, TTE showed EF 50-55%, G2DD.  Left heart cath was deferred till kidney function improved likely 2/2 to rhabdomyolysis and possible UTI, He received IV fluids, and was started on antibiotics, subsequently his kidney function improved Cr: 2.6 -> 2.4 -> 1.4 -> 1.2. EP consulted recommended orthostatics which were negative & TSH wnl. Neurology was consulted for seizure evaluation- MRI head done did not show any acute findings and EEG was normal. After improvement in kidney function, 10/8, patent underwent lexiscan stress test which showed Fixed Defect in the inferior wall the left ventricle with incomplete thickening suggesting prior injury. decision was made to transfer patient to cardiac telemetry for continued management and ischemic work up with [facility].      Interval history: Patient seen and examined at bedside this AM. NAD. No overnight events. s/p LHC showing 3VD, patient and family requesting to speak to CTS prior to making a decision. Bedside spirometry ordered.     Review of systems: A complete 10-point review of systems was obtained and is negative except as stated in the interval history.    Vitals:  ICU Vital Signs Last 24 Hrs  T(C): 36.3 (10 Oct 2024 04:37), Max: 36.6 (09 Oct 2024 20:28)  T(F): 97.4 (10 Oct 2024 04:37), Max: 97.9 (09 Oct 2024 20:28)  HR: 54 (10 Oct 2024 04:37) (49 - 68)  BP: 146/73 (10 Oct 2024 04:37) (136/71 - 173/85)  BP(mean): 100 (10 Oct 2024 04:37) (93 - 100)  RR: 20 (10 Oct 2024 04:37) (14 - 20)  SpO2: 95% (10 Oct 2024 04:37) (95% - 97%)    O2 Parameters below as of 09 Oct 2024 20:28  Patient On (Oxygen Delivery Method): room air    Ins & outs:     10-06 @ 07:01  -  10-07 @ 07:00  --------------------------------------------------------  IN: 1893 mL / OUT: 450 mL / NET: 1443 mL    10-07 @ 07:01  -  10-08 @ 07:00  --------------------------------------------------------  IN: 1416 mL / OUT: 725 mL / NET: 691 mL    08 Oct 2024 07:01  -  09 Oct 2024 07:00  --------------------------------------------------------  IN: 1165 mL / OUT: 650 mL / NET: 515 mL    09 Oct 2024 07:01  -  09 Oct 2024 11:08  --------------------------------------------------------  IN: 300 mL / OUT: 0 mL / NET: 300 mL    09 Oct 2024 07:01  -  10 Oct 2024 07:00  --------------------------------------------------------  IN: 675 mL / OUT: 400 mL / NET: 275 mL      Weight trend:  Weight (kg): 93.2 (10-04)    Physical exam:  General: No apparent distress  HEENT: Anicteric sclera. Moist mucous membranes.   Cardiac: Regular rate and rhythm. No murmurs, rubs, or gallops.   Vascular: Symmetric radial pulses. Dorsalis pedis pulses palpable.   Respiratory: Normal effort. Clear to ascultation.   Abdomen: Soft, nontender. Audible bowel sounds.   Extremities: Warm with no edema. No cyanosis or clubbing.   Skin: Warm and dry. No rash.   Neurologic: Grossly normal motor function.   Psychiatric: Oriented to person, place, and time.     Data reviewed:  - Telemetry:  45-78  - ECG (10/7/24): Sinus bradycardia. Rightward axis  - TTE (10/5/24): Summary:   1. Low-normal global left ventricular systolicfunction. Left   ventricular ejection fraction, by visual estimation, is 50 to 55%.   Moderate (Grade 2) diastolic dysfunction. No regional wall motion   abnormalities. Increased LV wall thickness (IVSd 1.4cm) with normal mass   index.   2. Normal right ventricular size and function.   3. Mildly enlarged left atrium.   4. No hemodynamically significant valvular disease.   5. Adequate TR velocity was not obtained to accurately assess RVSP.   6. There is no evidence of pericardial effusion.  - Stress test: 10/8/24 Impression:  1.   No evidence for ischemia during Lexiscan infusion as described above.  2.  Fixed Defect in the inferior wall the left ventricle with incomplete   thickening suggesting prior injury (scar).  3.  Mild global hypokinesis. Incomplete thickening of the inferior wall   the left ventricle suggesting prior injury (scar). All other walls of the   left ventricle thicken.  4.  Left ventricular ejection fraction calculated as 47% which is low.   Echocardiographic ejection fraction by visual estimation was 50-55% on   10/05/2024. Wall motion analysis suggests ejection fraction of 45%    - Cardiac catheterization: pending   -MR head IMPRESSION:  No acute infarct, acute intracranial hemorrhage, or mass effect.    - Labs:                        9.8    7.05  )-----------( 251      ( 10 Oct 2024 06:13 )             29.2     10-10    142  |  107  |  21[H]  ----------------------------<  132[H]  4.5   |  24  |  1.1    Ca    8.7      10 Oct 2024 06:13  Mg     1.7     10-10      Urinalysis Basic - ( 10 Oct 2024 06:13 )    Color: x / Appearance: x / SG: x / pH: x  Gluc: 132 mg/dL / Ketone: x  / Bili: x / Urobili: x   Blood: x / Protein: x / Nitrite: x   Leuk Esterase: x / RBC: x / WBC x   Sq Epi: x / Non Sq Epi: x / Bacteria: x        Medications:  MEDICATIONS  (STANDING):  aspirin  chewable 81 milliGRAM(s) Oral daily  atorvastatin 20 milliGRAM(s) Oral at bedtime  chlorhexidine 2% Cloths 1 Application(s) Topical <User Schedule>  dextrose 5%. 1000 milliLiter(s) (50 mL/Hr) IV Continuous <Continuous>  dextrose 5%. 1000 milliLiter(s) (100 mL/Hr) IV Continuous <Continuous>  dextrose 50% Injectable 12.5 Gram(s) IV Push once  dextrose 50% Injectable 25 Gram(s) IV Push once  dextrose 50% Injectable 25 Gram(s) IV Push once  glucagon  Injectable 1 milliGRAM(s) IntraMuscular once  insulin glargine Injectable (LANTUS) 9 Unit(s) SubCutaneous at bedtime  insulin lispro (ADMELOG) corrective regimen sliding scale   SubCutaneous three times a day before meals  insulin lispro Injectable (ADMELOG) 3 Unit(s) SubCutaneous three times a day before meals  isosorbide   mononitrate ER Tablet (IMDUR) 15 milliGRAM(s) Oral once  lisinopril 10 milliGRAM(s) Oral daily  magnesium sulfate  IVPB 2 Gram(s) IV Intermittent once  regadenoson Injectable 0.4 milliGRAM(s) IV Push once  sodium chloride 0.9%. 1000 milliLiter(s) (150 mL/Hr) IV Continuous <Continuous>    MEDICATIONS  (PRN):  dextrose Oral Gel 15 Gram(s) Oral once PRN Blood Glucose LESS THAN 70 milliGRAM(s)/deciliter       Chief complaint: Patient is a 84y old  Male who presents with a chief complaint of Fall (07 Oct 2024 11:54)    HPI: In brief, Adriano Grove is an 83 yo M with PMHx of HTN, HLD, DM, brought to ED for fall with possible syncope. In the ED patients ECG showed new TWI in inferolateral leads, elevated troponin (370> 344 > 280), elevated proBNP 1655, Creatine Kinase >6000, ARIELLE on admission Cr 2.6. Patient was admitted to [facility] for syncope wkup, cardiology consulted, started on ASA 81mg, TTE showed EF 50-55%, G2DD.  Left heart cath was deferred till kidney function improved likely 2/2 to rhabdomyolysis and possible UTI, He received IV fluids, and was started on antibiotics, subsequently his kidney function improved Cr: 2.6 -> 2.4 -> 1.4 -> 1.2. EP consulted recommended orthostatics which were negative & TSH wnl. Neurology was consulted for seizure evaluation- MRI head done did not show any acute findings and EEG was normal. After improvement in kidney function, 10/8, patent underwent lexiscan stress test which showed Fixed Defect in the inferior wall the left ventricle with incomplete thickening suggesting prior injury. decision was made to transfer patient to cardiac telemetry for continued management and ischemic work up with [facility].      Interval history: Patient seen and examined at bedside this AM. NAD. No overnight events. s/p LHC showing 3VD, patient and family requesting to speak to CTS prior to making a decision. Bedside spirometry ordered.     Review of systems: A complete 10-point review of systems was obtained and is negative except as stated in the interval history.    Vitals:  ICU Vital Signs Last 24 Hrs  T(C): 36.3 (10 Oct 2024 04:37), Max: 36.6 (09 Oct 2024 20:28)  T(F): 97.4 (10 Oct 2024 04:37), Max: 97.9 (09 Oct 2024 20:28)  HR: 54 (10 Oct 2024 04:37) (49 - 68)  BP: 146/73 (10 Oct 2024 04:37) (136/71 - 173/85)  BP(mean): 100 (10 Oct 2024 04:37) (93 - 100)  RR: 20 (10 Oct 2024 04:37) (14 - 20)  SpO2: 95% (10 Oct 2024 04:37) (95% - 97%)    O2 Parameters below as of 09 Oct 2024 20:28  Patient On (Oxygen Delivery Method): room air    Ins & outs:     10-06 @ 07:01  -  10-07 @ 07:00  --------------------------------------------------------  IN: 1893 mL / OUT: 450 mL / NET: 1443 mL    10-07 @ 07:01  -  10-08 @ 07:00  --------------------------------------------------------  IN: 1416 mL / OUT: 725 mL / NET: 691 mL    08 Oct 2024 07:01  -  09 Oct 2024 07:00  --------------------------------------------------------  IN: 1165 mL / OUT: 650 mL / NET: 515 mL    09 Oct 2024 07:01  -  09 Oct 2024 11:08  --------------------------------------------------------  IN: 300 mL / OUT: 0 mL / NET: 300 mL    09 Oct 2024 07:01  -  10 Oct 2024 07:00  --------------------------------------------------------  IN: 675 mL / OUT: 400 mL / NET: 275 mL      Weight trend:  Weight (kg): 93.2 (10-04)    Physical exam:  General: No apparent distress  HEENT: Anicteric sclera. Moist mucous membranes.   Cardiac: Regular rate and rhythm. No murmurs, rubs, or gallops.   Vascular: Symmetric radial pulses. Dorsalis pedis pulses palpable.   Respiratory: Normal effort. Clear to ascultation.   Abdomen: Soft, nontender. Audible bowel sounds.   Extremities: Warm with no edema. No cyanosis or clubbing.   Skin: Warm and dry. No rash.   Neurologic: Grossly normal motor function.   Psychiatric: Oriented to person, place, and time.     Data reviewed:  - Telemetry:  45-78  - ECG (10/7/24): Sinus bradycardia. Rightward axis  - TTE (10/5/24): Summary:   1. Low-normal global left ventricular systolicfunction. Left   ventricular ejection fraction, by visual estimation, is 50 to 55%.   Moderate (Grade 2) diastolic dysfunction. No regional wall motion   abnormalities. Increased LV wall thickness (IVSd 1.4cm) with normal mass   index.   2. Normal right ventricular size and function.   3. Mildly enlarged left atrium.   4. No hemodynamically significant valvular disease.   5. Adequate TR velocity was not obtained to accurately assess RVSP.   6. There is no evidence of pericardial effusion.  - Stress test: 10/8/24 Impression:  1.   No evidence for ischemia during Lexiscan infusion as described above.  2.  Fixed Defect in the inferior wall the left ventricle with incomplete   thickening suggesting prior injury (scar).  3.  Mild global hypokinesis. Incomplete thickening of the inferior wall   the left ventricle suggesting prior injury (scar). All other walls of the   left ventricle thicken.  4.  Left ventricular ejection fraction calculated as 47% which is low.   Echocardiographic ejection fraction by visual estimation was 50-55% on   10/05/2024. Wall motion analysis suggests ejection fraction of 45%    - Cardiac catheterization: pending   -MR head IMPRESSION:  No acute infarct, acute intracranial hemorrhage, or mass effect.    - Labs:                        9.8    7.05  )-----------( 251      ( 10 Oct 2024 06:13 )             29.2     10-10    142  |  107  |  21[H]  ----------------------------<  132[H]  4.5   |  24  |  1.1    Ca    8.7      10 Oct 2024 06:13  Mg     1.7     10-10      Urinalysis Basic - ( 10 Oct 2024 06:13 )    Color: x / Appearance: x / SG: x / pH: x  Gluc: 132 mg/dL / Ketone: x  / Bili: x / Urobili: x   Blood: x / Protein: x / Nitrite: x   Leuk Esterase: x / RBC: x / WBC x   Sq Epi: x / Non Sq Epi: x / Bacteria: x        Medications:  MEDICATIONS  (STANDING):  amLODIPine   Tablet 5 milliGRAM(s) Oral daily  aspirin  chewable 81 milliGRAM(s) Oral daily  atorvastatin 20 milliGRAM(s) Oral at bedtime  chlorhexidine 2% Cloths 1 Application(s) Topical <User Schedule>  chlorhexidine 4% Liquid 1 Application(s) Topical once  chlorhexidine 4% Liquid 1 Application(s) Topical once  dextrose 5%. 1000 milliLiter(s) (50 mL/Hr) IV Continuous <Continuous>  dextrose 5%. 1000 milliLiter(s) (100 mL/Hr) IV Continuous <Continuous>  dextrose 50% Injectable 12.5 Gram(s) IV Push once  dextrose 50% Injectable 25 Gram(s) IV Push once  dextrose 50% Injectable 25 Gram(s) IV Push once  glucagon  Injectable 1 milliGRAM(s) IntraMuscular once  insulin lispro (ADMELOG) corrective regimen sliding scale   SubCutaneous three times a day before meals  insulin lispro Injectable (ADMELOG) 3 Unit(s) SubCutaneous three times a day before meals  sodium chloride 0.9%. 1000 milliLiter(s) (150 mL/Hr) IV Continuous <Continuous>    MEDICATIONS  (PRN):  dextrose Oral Gel 15 Gram(s) Oral once PRN Blood Glucose LESS THAN 70 milliGRAM(s)/deciliter

## 2024-10-10 NOTE — PROGRESS NOTE ADULT - ASSESSMENT
Assessment: 83 yo M with PMHx of HTN, HLD, DM, brought to ED for fall with possible syncope. In the ED patients ECG showed new TWI in inferolateral leads, elevated troponin (370> 344 > 280), elevated proBNP 1655, Creatine Kinase >6000, ARIELLE on admission Cr 2.6. LHC deferred 2/2 ARIELLE/ Rhabdo, syncope wkup negative, [facility]ala ST showed Fixed Defect in the inferior wall the left ventricle with incomplete thickening suggesting prior injury. decision was made to transfer patient to [facility] for LHC.     Problems discussed and associated plan:  #Syncope, unclear etiology ruled out neurological cause, infectious causes; possible CAD (new ekg changes inferolateral TWI, elevated troponin)  - EKG: EKG with TWIs in inferolateral leads   - Trops--  370--> 344--> 280   - Cardio: type II NSTEMI given infection vs ischemia    -TTE Left ventricular ejection fraction, by visual estimation, is 50 to 55% and mod G2DD.  - EP: orthostatics negative, TSH within normal limits may benefit from ILR if above work up is negative  -10/8 NST defect in left ventricle with possible scar   - s/p LHC showing 3VD- CTS on board; will order preworkup     Left shoulder pain and stiffness  Transient LEFT arm weakness    - MRI brain negative  - case d/w neuro attending-unlikely TIA, more likely nerve injury from fall  - no need for CTA as per neurology     #HTN   -c/w Lisinopril 10mg for better BP control on ramipril at home   -can resume ace now that creatinine improving     #ARIELLE on admission   - likely from rhabdo CK trending down   - SCr improving  - CK downtrended     #Pyuria  - s/p course of abx   - cultures negative      #Transaminitis  - ggt normal  - RUQ sono normal  - suggests MSK cause of elevation especially since CK elevated  - can resume Lipitior    DM  - hold home oral meds  - target blood sugar 140 180  - start insulin if needed     HLD  - resume Lipitor    Please contact me with any questions or concerns at [phone]. Assessment: 83 yo M with PMHx of HTN, HLD, DM, brought to ED for fall with possible syncope. In the ED patients ECG showed new TWI in inferolateral leads, elevated troponin (370> 344 > 280), elevated proBNP 1655, Creatine Kinase >6000, ARIELLE on admission Cr 2.6. LHC deferred 2/2 ARIELLE/ Rhabdo, syncope wkup negative, Tala ST showed Fixed Defect in the inferior wall the left ventricle with incomplete thickening suggesting prior injury. decision was made to transfer patient to [facility] for LHC.     Problems discussed and associated plan:  #Syncope, unclear etiology ruled out neurological cause, infectious causes; possible CAD (new ekg changes inferolateral TWI, elevated troponin)  - EKG: EKG with TWIs in inferolateral leads   - Trops--  370--> 344--> 280   - Cardio: type II NSTEMI given infection vs ischemia    -TTE Left ventricular ejection fraction, by visual estimation, is 50 to 55% and mod G2DD.  - EP: orthostatics negative, TSH within normal limits  -10/8 NST defect in left ventricle with possible scar   - s/p LHC showing 3VD- CTS on board; PFTs ordered, CABG tm  -NPO after midnight     #Anemia  -full anemia panel ordered f/u B12, reticulocyte count, folate, iron panel  -keep active T&s    Left shoulder pain and stiffness  Transient LEFT arm weakness    - MRI brain negative  - case d/w neuro attending-unlikely TIA, more likely nerve injury from fall  - no need for CTA as per neurology     #HTN   -dc Lisinopril 2/2 CABG  -start Amlodipine 5mg for BP control     #ARIELLE on admission -resolved  - likely from rhabdo CK trending down   - CK downtrended     #Pyuria  - s/p course of abx   - cultures negative   -rp UA     #Transaminitis  - ggt normal  - RUQ sono normal  - suggests MSK cause of elevation especially since CK elevated  - can resume Lipitor    DM  - hold home oral meds  - target blood sugar 140 180  - start insulin if needed     HLD  - resume Lipitor    Please contact me with any questions or concerns at [phone].

## 2024-10-11 LAB
ACANTHOCYTES BLD QL SMEAR: SIGNIFICANT CHANGE UP
ALBUMIN SERPL ELPH-MCNC: 2.8 G/DL — LOW (ref 3.5–5.2)
ALP SERPL-CCNC: 102 U/L — SIGNIFICANT CHANGE UP (ref 30–115)
ALT FLD-CCNC: 256 U/L — HIGH (ref 0–41)
ANION GAP SERPL CALC-SCNC: 11 MMOL/L — SIGNIFICANT CHANGE UP (ref 7–14)
APTT BLD: 26.9 SEC — LOW (ref 27–39.2)
AST SERPL-CCNC: 414 U/L — HIGH (ref 0–41)
BASOPHILS # BLD AUTO: 0 K/UL — SIGNIFICANT CHANGE UP (ref 0–0.2)
BASOPHILS NFR BLD AUTO: 0 % — SIGNIFICANT CHANGE UP (ref 0–1)
BILIRUB SERPL-MCNC: 1.5 MG/DL — HIGH (ref 0.2–1.2)
BUN SERPL-MCNC: 18 MG/DL — SIGNIFICANT CHANGE UP (ref 10–20)
BURR CELLS BLD QL SMEAR: PRESENT — SIGNIFICANT CHANGE UP
CALCIUM SERPL-MCNC: 7.7 MG/DL — LOW (ref 8.4–10.5)
CHLORIDE SERPL-SCNC: 107 MMOL/L — SIGNIFICANT CHANGE UP (ref 98–110)
CO2 SERPL-SCNC: 23 MMOL/L — SIGNIFICANT CHANGE UP (ref 17–32)
CREAT SERPL-MCNC: 1 MG/DL — SIGNIFICANT CHANGE UP (ref 0.7–1.5)
EGFR: 75 ML/MIN/1.73M2 — SIGNIFICANT CHANGE UP
EOSINOPHIL # BLD AUTO: 0.17 K/UL — SIGNIFICANT CHANGE UP (ref 0–0.7)
EOSINOPHIL NFR BLD AUTO: 0.9 % — SIGNIFICANT CHANGE UP (ref 0–8)
FERRITIN SERPL-MCNC: 447 NG/ML — HIGH (ref 30–400)
FOLATE SERPL-MCNC: 10.1 NG/ML — SIGNIFICANT CHANGE UP
GAS PNL BLDA: SIGNIFICANT CHANGE UP
GAS PNL BLDA: SIGNIFICANT CHANGE UP
GLUCOSE BLDC GLUCOMTR-MCNC: 103 MG/DL — HIGH (ref 70–99)
GLUCOSE BLDC GLUCOMTR-MCNC: 110 MG/DL — HIGH (ref 70–99)
GLUCOSE BLDC GLUCOMTR-MCNC: 118 MG/DL — HIGH (ref 70–99)
GLUCOSE BLDC GLUCOMTR-MCNC: 125 MG/DL — HIGH (ref 70–99)
GLUCOSE BLDC GLUCOMTR-MCNC: 140 MG/DL — HIGH (ref 70–99)
GLUCOSE BLDC GLUCOMTR-MCNC: 144 MG/DL — HIGH (ref 70–99)
GLUCOSE BLDC GLUCOMTR-MCNC: 157 MG/DL — HIGH (ref 70–99)
GLUCOSE BLDC GLUCOMTR-MCNC: 158 MG/DL — HIGH (ref 70–99)
GLUCOSE BLDC GLUCOMTR-MCNC: 167 MG/DL — HIGH (ref 70–99)
GLUCOSE SERPL-MCNC: 163 MG/DL — HIGH (ref 70–99)
HAPTOGLOB SERPL-MCNC: 314 MG/DL — HIGH (ref 34–200)
HCT VFR BLD CALC: 35.1 % — LOW (ref 42–52)
HCT VFR BLD CALC: 36 % — LOW (ref 42–52)
HGB BLD-MCNC: 11.8 G/DL — LOW (ref 14–18)
HGB BLD-MCNC: 12 G/DL — LOW (ref 14–18)
INR BLD: 1.29 RATIO — SIGNIFICANT CHANGE UP (ref 0.65–1.3)
LYMPHOCYTES # BLD AUTO: 0.67 K/UL — LOW (ref 1.2–3.4)
LYMPHOCYTES # BLD AUTO: 3.5 % — LOW (ref 20.5–51.1)
MANUAL SMEAR VERIFICATION: SIGNIFICANT CHANGE UP
MCHC RBC-ENTMCNC: 30.7 PG — SIGNIFICANT CHANGE UP (ref 27–31)
MCHC RBC-ENTMCNC: 31.3 PG — HIGH (ref 27–31)
MCHC RBC-ENTMCNC: 33.3 G/DL — SIGNIFICANT CHANGE UP (ref 32–37)
MCHC RBC-ENTMCNC: 33.6 G/DL — SIGNIFICANT CHANGE UP (ref 32–37)
MCV RBC AUTO: 91.4 FL — SIGNIFICANT CHANGE UP (ref 80–94)
MCV RBC AUTO: 94 FL — SIGNIFICANT CHANGE UP (ref 80–94)
MONOCYTES # BLD AUTO: 0.84 K/UL — HIGH (ref 0.1–0.6)
MONOCYTES NFR BLD AUTO: 4.4 % — SIGNIFICANT CHANGE UP (ref 1.7–9.3)
NEUTROPHILS # BLD AUTO: 17.44 K/UL — HIGH (ref 1.4–6.5)
NEUTROPHILS NFR BLD AUTO: 91.2 % — HIGH (ref 42.2–75.2)
NRBC # BLD: 0 /100 WBCS — SIGNIFICANT CHANGE UP (ref 0–0)
NRBC # BLD: 9 /100 WBCS — HIGH (ref 0–0)
NRBC # BLD: SIGNIFICANT CHANGE UP /100 WBCS (ref 0–0)
PLAT MORPH BLD: ABNORMAL
PLATELET # BLD AUTO: 181 K/UL — SIGNIFICANT CHANGE UP (ref 130–400)
PLATELET # BLD AUTO: 246 K/UL — SIGNIFICANT CHANGE UP (ref 130–400)
PMV BLD: 9.7 FL — SIGNIFICANT CHANGE UP (ref 7.4–10.4)
PMV BLD: 9.9 FL — SIGNIFICANT CHANGE UP (ref 7.4–10.4)
POIKILOCYTOSIS BLD QL AUTO: SIGNIFICANT CHANGE UP
POLYCHROMASIA BLD QL SMEAR: SLIGHT — SIGNIFICANT CHANGE UP
POTASSIUM SERPL-MCNC: 4.1 MMOL/L — SIGNIFICANT CHANGE UP (ref 3.5–5)
POTASSIUM SERPL-SCNC: 4.1 MMOL/L — SIGNIFICANT CHANGE UP (ref 3.5–5)
PROT SERPL-MCNC: 4.5 G/DL — LOW (ref 6–8)
PROTHROM AB SERPL-ACNC: 14.7 SEC — HIGH (ref 9.95–12.87)
RBC # BLD: 3.83 M/UL — LOW (ref 4.7–6.1)
RBC # BLD: 3.84 M/UL — LOW (ref 4.7–6.1)
RBC # FLD: 13.2 % — SIGNIFICANT CHANGE UP (ref 11.5–14.5)
RBC # FLD: 13.4 % — SIGNIFICANT CHANGE UP (ref 11.5–14.5)
RBC BLD AUTO: ABNORMAL
SODIUM SERPL-SCNC: 141 MMOL/L — SIGNIFICANT CHANGE UP (ref 135–146)
VIT B12 SERPL-MCNC: 281 PG/ML — SIGNIFICANT CHANGE UP (ref 232–1245)
WBC # BLD: 19.12 K/UL — HIGH (ref 4.8–10.8)
WBC # BLD: 7.96 K/UL — SIGNIFICANT CHANGE UP (ref 4.8–10.8)
WBC # FLD AUTO: 19.12 K/UL — HIGH (ref 4.8–10.8)
WBC # FLD AUTO: 7.96 K/UL — SIGNIFICANT CHANGE UP (ref 4.8–10.8)

## 2024-10-11 PROCEDURE — 33519 CABG ARTERY-VEIN THREE: CPT

## 2024-10-11 PROCEDURE — 33508 ENDOSCOPIC VEIN HARVEST: CPT | Mod: AS,59

## 2024-10-11 PROCEDURE — 33519 CABG ARTERY-VEIN THREE: CPT | Mod: AS

## 2024-10-11 PROCEDURE — 33533 CABG ARTERIAL SINGLE: CPT

## 2024-10-11 PROCEDURE — 33533 CABG ARTERIAL SINGLE: CPT | Mod: AS

## 2024-10-11 PROCEDURE — 76998 US GUIDE INTRAOP: CPT | Mod: 26,59

## 2024-10-11 PROCEDURE — 71045 X-RAY EXAM CHEST 1 VIEW: CPT | Mod: 26

## 2024-10-11 PROCEDURE — 76998 US GUIDE INTRAOP: CPT | Mod: 26,AS,59

## 2024-10-11 PROCEDURE — 33508 ENDOSCOPIC VEIN HARVEST: CPT | Mod: 59

## 2024-10-11 PROCEDURE — 99291 CRITICAL CARE FIRST HOUR: CPT

## 2024-10-11 RX ORDER — ASPIRIN 325 MG
300 TABLET ORAL ONCE
Refills: 0 | Status: DISCONTINUED | OUTPATIENT
Start: 2024-10-11 | End: 2024-10-12

## 2024-10-11 RX ORDER — ATORVASTATIN CALCIUM 10 MG/1
40 TABLET, FILM COATED ORAL AT BEDTIME
Refills: 0 | Status: DISCONTINUED | OUTPATIENT
Start: 2024-10-12 | End: 2024-10-12

## 2024-10-11 RX ORDER — FENTANYL CITRATE-0.9 % NACL/PF 300MCG/30
25 PATIENT CONTROLLED ANALGESIA VIAL INJECTION EVERY 4 HOURS
Refills: 0 | Status: DISCONTINUED | OUTPATIENT
Start: 2024-10-11 | End: 2024-10-12

## 2024-10-11 RX ORDER — CHLORHEXIDINE GLUCONATE ORAL RINSE 1.2 MG/ML
15 SOLUTION DENTAL EVERY 12 HOURS
Refills: 0 | Status: DISCONTINUED | OUTPATIENT
Start: 2024-10-11 | End: 2024-10-12

## 2024-10-11 RX ORDER — INSULIN REGULAR, HUMAN 100/ML
1 VIAL (ML) INJECTION
Qty: 100 | Refills: 0 | Status: DISCONTINUED | OUTPATIENT
Start: 2024-10-11 | End: 2024-10-12

## 2024-10-11 RX ORDER — PROPOFOL 10 MG/ML
15 INJECTION, EMULSION INTRAVENOUS
Qty: 1000 | Refills: 0 | Status: DISCONTINUED | OUTPATIENT
Start: 2024-10-11 | End: 2024-10-12

## 2024-10-11 RX ORDER — SODIUM CHLORIDE 0.9 % (FLUSH) 0.9 %
1000 SYRINGE (ML) INJECTION
Refills: 0 | Status: DISCONTINUED | OUTPATIENT
Start: 2024-10-11 | End: 2024-10-16

## 2024-10-11 RX ORDER — DEXMEDETOMIDINE HYDROCHLORIDE IN 0.9% SODIUM CHLORIDE 400 UG/100ML
0.25 INJECTION INTRAVENOUS
Qty: 200 | Refills: 0 | Status: DISCONTINUED | OUTPATIENT
Start: 2024-10-11 | End: 2024-10-12

## 2024-10-11 RX ORDER — ASPIRIN 325 MG
325 TABLET ORAL DAILY
Refills: 0 | Status: DISCONTINUED | OUTPATIENT
Start: 2024-10-12 | End: 2024-10-16

## 2024-10-11 RX ORDER — ONDANSETRON HCL/PF 4 MG/2 ML
4 VIAL (ML) INJECTION ONCE
Refills: 0 | Status: DISCONTINUED | OUTPATIENT
Start: 2024-10-11 | End: 2024-10-16

## 2024-10-11 RX ORDER — NICARDIPINE HCL 25 MG/10ML
5 AMPUL (ML) INTRAVENOUS
Qty: 40 | Refills: 0 | Status: DISCONTINUED | OUTPATIENT
Start: 2024-10-11 | End: 2024-10-12

## 2024-10-11 RX ORDER — ALBUTEROL 90 MCG
2 AEROSOL (GRAM) INHALATION EVERY 6 HOURS
Refills: 0 | Status: DISCONTINUED | OUTPATIENT
Start: 2024-10-11 | End: 2024-10-12

## 2024-10-11 RX ORDER — ALCOHOL ANTISEPTIC PADS
50 PADS, MEDICATED (EA) TOPICAL
Refills: 0 | Status: DISCONTINUED | OUTPATIENT
Start: 2024-10-11 | End: 2024-10-16

## 2024-10-11 RX ORDER — BISACODYL 5 MG/1
5 TABLET, COATED ORAL EVERY 12 HOURS
Refills: 0 | Status: DISCONTINUED | OUTPATIENT
Start: 2024-10-12 | End: 2024-10-16

## 2024-10-11 RX ORDER — ONDANSETRON HCL/PF 4 MG/2 ML
4 VIAL (ML) INJECTION EVERY 6 HOURS
Refills: 0 | Status: DISCONTINUED | OUTPATIENT
Start: 2024-10-11 | End: 2024-10-12

## 2024-10-11 RX ORDER — CHLORHEXIDINE GLUCONATE ORAL RINSE 1.2 MG/ML
1 SOLUTION DENTAL DAILY
Refills: 0 | Status: DISCONTINUED | OUTPATIENT
Start: 2024-10-12 | End: 2024-10-16

## 2024-10-11 RX ORDER — FENTANYL CITRATE-0.9 % NACL/PF 300MCG/30
25 PATIENT CONTROLLED ANALGESIA VIAL INJECTION ONCE
Refills: 0 | Status: DISCONTINUED | OUTPATIENT
Start: 2024-10-11 | End: 2024-10-11

## 2024-10-11 RX ORDER — OXYCODONE HYDROCHLORIDE 30 MG/1
5 TABLET, FILM COATED, EXTENDED RELEASE ORAL EVERY 4 HOURS
Refills: 0 | Status: DISCONTINUED | OUTPATIENT
Start: 2024-10-12 | End: 2024-10-16

## 2024-10-11 RX ORDER — ACETAMINOPHEN 325 MG
650 TABLET ORAL EVERY 6 HOURS
Refills: 0 | Status: DISCONTINUED | OUTPATIENT
Start: 2024-10-12 | End: 2024-10-12

## 2024-10-11 RX ORDER — SENNOSIDES 8.6 MG
2 TABLET ORAL AT BEDTIME
Refills: 0 | Status: DISCONTINUED | OUTPATIENT
Start: 2024-10-12 | End: 2024-10-16

## 2024-10-11 RX ORDER — NOREPINEPHRINE BITARTRATE/D5W 16MG/250ML
0.05 PLASTIC BAG, INJECTION (ML) INTRAVENOUS
Qty: 8 | Refills: 0 | Status: DISCONTINUED | OUTPATIENT
Start: 2024-10-11 | End: 2024-10-12

## 2024-10-11 RX ORDER — VANCOMYCIN HCL-SODIUM CHLORIDE IV SOLN 1.5 GM/250ML-0.9% 1.5-0.9/25 GM/ML-%
1000 SOLUTION INTRAVENOUS EVERY 12 HOURS
Refills: 0 | Status: COMPLETED | OUTPATIENT
Start: 2024-10-11 | End: 2024-10-13

## 2024-10-11 RX ORDER — OXYCODONE HYDROCHLORIDE 30 MG/1
10 TABLET, FILM COATED, EXTENDED RELEASE ORAL EVERY 4 HOURS
Refills: 0 | Status: DISCONTINUED | OUTPATIENT
Start: 2024-10-11 | End: 2024-10-16

## 2024-10-11 RX ORDER — ACETAMINOPHEN 325 MG
1000 TABLET ORAL ONCE
Refills: 0 | Status: DISCONTINUED | OUTPATIENT
Start: 2024-10-11 | End: 2024-10-12

## 2024-10-11 RX ORDER — PROTAMINE SULFATE 10 MG/ML
25 INJECTION, SOLUTION INTRAVENOUS ONCE
Refills: 0 | Status: COMPLETED | OUTPATIENT
Start: 2024-10-11 | End: 2024-10-11

## 2024-10-11 RX ORDER — FAMOTIDINE 40 MG
20 TABLET ORAL EVERY 12 HOURS
Refills: 0 | Status: DISCONTINUED | OUTPATIENT
Start: 2024-10-11 | End: 2024-10-12

## 2024-10-11 RX ORDER — CEFAZOLIN SODIUM 1 G
2000 VIAL (EA) INJECTION EVERY 8 HOURS
Refills: 0 | Status: COMPLETED | OUTPATIENT
Start: 2024-10-11 | End: 2024-10-13

## 2024-10-11 RX ADMIN — Medication 25 MICROGRAM(S): at 18:40

## 2024-10-11 RX ADMIN — Medication 25 MICROGRAM(S): at 17:40

## 2024-10-11 RX ADMIN — CHLORHEXIDINE GLUCONATE ORAL RINSE 15 MILLILITER(S): 1.2 SOLUTION DENTAL at 17:41

## 2024-10-11 RX ADMIN — PROTAMINE SULFATE 315 MILLIGRAM(S): 10 INJECTION, SOLUTION INTRAVENOUS at 16:17

## 2024-10-11 RX ADMIN — Medication 20 MILLIGRAM(S): at 05:12

## 2024-10-11 RX ADMIN — Medication 25 MICROGRAM(S): at 21:10

## 2024-10-11 RX ADMIN — Medication 250 MILLILITER(S): at 18:16

## 2024-10-11 RX ADMIN — CHLORHEXIDINE GLUCONATE ORAL RINSE 1 APPLICATION(S): 1.2 SOLUTION DENTAL at 05:12

## 2024-10-11 RX ADMIN — Medication 5 MILLIGRAM(S): at 05:12

## 2024-10-11 RX ADMIN — Medication 20 MILLIGRAM(S): at 17:41

## 2024-10-11 RX ADMIN — CHLORHEXIDINE GLUCONATE ORAL RINSE 15 MILLILITER(S): 1.2 SOLUTION DENTAL at 05:12

## 2024-10-11 RX ADMIN — Medication 100 MILLIEQUIVALENT(S): at 15:36

## 2024-10-11 RX ADMIN — VANCOMYCIN HCL-SODIUM CHLORIDE IV SOLN 1.5 GM/250ML-0.9% 250 MILLIGRAM(S): 1.5-0.9/25 SOLUTION at 17:41

## 2024-10-11 RX ADMIN — Medication 25 MICROGRAM(S): at 21:40

## 2024-10-11 RX ADMIN — Medication 100 MILLIGRAM(S): at 17:40

## 2024-10-11 RX ADMIN — CHLORHEXIDINE GLUCONATE ORAL RINSE 1 APPLICATION(S): 1.2 SOLUTION DENTAL at 05:04

## 2024-10-11 NOTE — PRE-ANESTHESIA EVALUATION ADULT - NSANTHPROCED_GEN_ALL_CORE
Arterial Catheter/Central Venous Catheter/Pulmonary Artery Catheter/Transesophageal Echocardiogram/Regional Block

## 2024-10-11 NOTE — BRIEF OPERATIVE NOTE - NSICDXBRIEFPREOP_GEN_ALL_CORE_FT
PRE-OP DIAGNOSIS:  Abnormal stress test 11-Oct-2024 15:22:03  Mitchell Chamorro  Triple vessel coronary artery disease 11-Oct-2024 15:21:55  Mitchell Chamorro  Syncope 11-Oct-2024 15:21:46  Mitchell Chamorro

## 2024-10-11 NOTE — PRE-ANESTHESIA EVALUATION ADULT - NSRADCARDRESULTSFT_GEN_ALL_CORE
TTE 10/5/24  Summary:   1. Low-normal global left ventricular systolicfunction. Left   ventricular ejection fraction, by visual estimation, is 50 to 55%.   Moderate (Grade 2) diastolic dysfunction. No regional wall motion   abnormalities. Increased LV wall thickness (IVSd 1.4cm) with normal mass   index.   2. Normal right ventricular size and function.   3. Mildly enlarged left atrium.   4. No hemodynamically significant valvular disease.   5. Adequate TR velocity was not obtained to accurately assess RVSP.   6. There is no evidence of pericardial effusion.

## 2024-10-11 NOTE — ANESTHESIA FOLLOW-UP NOTE - NSEVALATIONFT_GEN_ALL_CORE
83 y/o M pmhx of DM, HTN, HLD now s/p CABG without CPB.  Patient transferred in stable condition, intubated from CT OR2 to CTICU.  Patient on 0.5mcg/kg/hr precedex and 0.01mcg/kg/min norepinephrine for transport.  Patient remains hemodynamically stable upon arrival.    Vitals:  106/70  HR 80, a-paced  SPO2 100%, mechanically ventilated VCAC 500TV, RR 14, 80% FIO2

## 2024-10-11 NOTE — PROGRESS NOTE ADULT - SUBJECTIVE AND OBJECTIVE BOX
CTU Attending Progress Daily Note     11 Oct 2024 23:06  Procedure:CABG x4  POD#: 0    pt came out of Or on vent A paced 1t 72  underlying rgthm 60 sinus   levo and insulin drip    Off Pump case  C 1.5  UO 1400   pt palced on SBT by me and extubated  to VM 40% LA 0.9     He has history of DM (diabetes mellitus)    HTN (hypertension)    HLD (hyperlipidemia)      HPI:  84 years old male patient with pmh of HLD, DM, HTN brought to the ed for fall. Hx taken from the patient and is aox3 at time of the interview.  Patient states that he was going down to his basement, and when he got there the next thing he remembers is lying himself on the floor on his back. He does not know wether he lost consciousness or not not but thinks that he lost conciousnes as he does not know what actually happened and how he found himself on the floor. On further inquiry, patient states that he was trying to get up from the floor then he felt that his left shoulder is weak, painful and stiff. on trying to get up from the ground, he state that he hit his head against the ground .  He was finally able to gain his strength back and get up, he went up to his room and slept. His sons called his PCP who advised him to go to the ER. Further review of the systems is negative for any headache, light headiness dizziness palpitations, chest pain, nausea, vomit, diarrhea, dysuria , frequency, urgency, extremity weakness and other significant complaints.                (05 Oct 2024 02:37)      Hospital Course:    OBJECTIVE:  ICU Vital Signs Last 24 Hrs  T(C): 37.7 (11 Oct 2024 23:00), Max: 37.7 (11 Oct 2024 22:00)  T(F): 99.9 (11 Oct 2024 23:00), Max: 99.9 (11 Oct 2024 22:00)  HR: 93 (11 Oct 2024 22:45) (44 - 93)  BP: 94/55 (11 Oct 2024 22:00) (94/55 - 174/84)  BP(mean): 70 (11 Oct 2024 22:00) (69 - 122)  ABP: 77/43 (11 Oct 2024 22:45) (77/43 - 141/66)  ABP(mean): 53 (11 Oct 2024 22:45) (53 - 90)  RR: 18 (11 Oct 2024 22:45) (10 - 22)  SpO2: 100% (11 Oct 2024 22:45) (95% - 100%)    O2 Parameters below as of 11 Oct 2024 23:00  Patient On (Oxygen Delivery Method): nasal cannula  O2 Flow (L/min): 4        I&O's Summary    10 Oct 2024 07:01  -  11 Oct 2024 07:00  --------------------------------------------------------  IN: 448 mL / OUT: 1200 mL / NET: -752 mL    11 Oct 2024 07:01  -  11 Oct 2024 23:06  --------------------------------------------------------  IN: 857 mL / OUT: 649 mL / NET: 208 mL      I&O's Detail    10 Oct 2024 07:01  -  11 Oct 2024 07:00  --------------------------------------------------------  IN:    Oral Fluid: 120 mL    PRBCs (Packed Red Blood Cells): 328 mL  Total IN: 448 mL    OUT:    Voided (mL): 1200 mL  Total OUT: 1200 mL    Total NET: -752 mL      11 Oct 2024 07:01  -  11 Oct 2024 23:06  --------------------------------------------------------  IN:    Albumin 5%  - 500 mL: 500 mL    Insulin: 19 mL    IV PiggyBack: 50 mL    IV PiggyBack: 100 mL    Nitroglycerin: 6 mL    Norepinephrine: 2 mL    sodium chloride 0.9%: 180 mL  Total IN: 857 mL    OUT:    Chest Tube (mL): 150 mL    Chest Tube (mL): 39 mL    Chest Tube (mL): 110 mL    Dexmedetomidine: 0 mL    Indwelling Catheter - Urethral (mL): 350 mL    NiCARdipine: 0 mL    Propofol: 0 mL  Total OUT: 649 mL    Total NET: 208 mL        Adult Advanced Hemodynamics Last 24 Hrs  CVP(mm Hg): 12 (11 Oct 2024 22:45) (2 - 12)  CVP(cm H2O): --  CO: 5.1 (11 Oct 2024 22:00) (5 - 6)  CI: 2.4 (11 Oct 2024 22:00) (2.3 - 2.8)  PA: 35/19 (11 Oct 2024 22:45) (22/10 - 38/15)  PA(mean): 25 (11 Oct 2024 22:45) (16 - 25)  PCWP: --  SVR: 830 (11 Oct 2024 22:00) (799 - 1316)  SVRI: 1764 (11 Oct 2024 22:00) (1690 - 2796)  PVR: --  PVRI: --  Mode: CPAP with PS  FiO2: 50  PEEP: 5  PS: 7    CAPILLARY BLOOD GLUCOSE      POCT Blood Glucose.: 110 mg/dL (11 Oct 2024 22:09)  POCT Blood Glucose.: 103 mg/dL (11 Oct 2024 21:03)  POCT Blood Glucose.: 118 mg/dL (11 Oct 2024 19:48)  POCT Blood Glucose.: 125 mg/dL (11 Oct 2024 19:00)  POCT Blood Glucose.: 144 mg/dL (11 Oct 2024 18:12)  POCT Blood Glucose.: 157 mg/dL (11 Oct 2024 17:04)  POCT Blood Glucose.: 167 mg/dL (11 Oct 2024 16:05)  POCT Blood Glucose.: 158 mg/dL (11 Oct 2024 14:51)  POCT Blood Glucose.: 140 mg/dL (11 Oct 2024 05:33)    LABS:  ABG - ( 11 Oct 2024 17:18 )  pH, Arterial: 7.37  pH, Blood: x     /  pCO2: 35    /  pO2: 111   / HCO3: 20    / Base Excess: -4.4  /  SaO2: 99.2                                    11.8   19.12 )-----------( 181      ( 11 Oct 2024 15:04 )             35.1     10-11    141  |  107  |  18  ----------------------------<  163[H]  4.1   |  23  |  1.0    Ca    7.7[L]      11 Oct 2024 15:04  Mg     1.7     10-10    TPro  4.5[L]  /  Alb  2.8[L]  /  TBili  1.5[H]  /  DBili  x   /  AST  414[H]  /  ALT  256[H]  /  AlkPhos  102  10-11    PT/INR - ( 11 Oct 2024 15:04 )   PT: 14.70 sec;   INR: 1.29 ratio         PTT - ( 11 Oct 2024 15:04 )  PTT:26.9 sec  Urinalysis Basic - ( 11 Oct 2024 15:04 )    Color: x / Appearance: x / SG: x / pH: x  Gluc: 163 mg/dL / Ketone: x  / Bili: x / Urobili: x   Blood: x / Protein: x / Nitrite: x   Leuk Esterase: x / RBC: x / WBC x   Sq Epi: x / Non Sq Epi: x / Bacteria: x        Home Medications:  ALLOPURINOL 100 MG TABLET: 1 tab(s) orally once a day (05 Oct 2024 04:51)  ATORVASTATIN 40 MG TABLET: 1 tab(s) orally once a day (05 Oct 2024 04:51)  dapagliflozin 5 mg oral tablet: 1 tab(s) orally once a day (05 Oct 2024 13:01)  ezetimibe 10 mg oral tablet: 1 tab(s) orally once a day (05 Oct 2024 04:51)  JANUMET 50/500 MG TABLET MS: TAKE 1 TABLET EVERY DAY (05 Oct 2024 04:51)  RAMIPRIL 10 MG CAPS: TAKE 1 CAPSULE EVERY DAY (05 Oct 2024 04:51)    HOSPITAL MEDICATIONS:  MEDICATIONS  (STANDING):  albumin human  5% IVPB 3000 milliLiter(s) IV Intermittent once  albuterol    90 MICROgram(s) HFA Inhaler 2 Puff(s) Inhalation every 6 hours  aspirin Suppository 300 milliGRAM(s) Rectal once  ceFAZolin   IVPB 2000 milliGRAM(s) IV Intermittent every 8 hours  chlorhexidine 0.12% Liquid 15 milliLiter(s) Oral Mucosa every 12 hours  chlorhexidine 0.12% Liquid 15 milliLiter(s) Oral Mucosa every 12 hours  dexMEDEtomidine Infusion 0.25 MICROgram(s)/kG/Hr (5.83 mL/Hr) IV Continuous <Continuous>  dextrose 50% Injectable 50 milliLiter(s) IV Push every 15 minutes  famotidine Injectable 20 milliGRAM(s) IV Push every 12 hours  insulin regular Infusion 1 Unit(s)/Hr (1 mL/Hr) IV Continuous <Continuous>  ipratropium 17 MICROgram(s) HFA Inhaler 2 Puff(s) Inhalation every 6 hours  meperidine     Injectable 25 milliGRAM(s) IV Push once  niCARdipine Infusion 5 mG/Hr (25 mL/Hr) IV Continuous <Continuous>  nitroglycerin  Infusion 5 MICROgram(s)/Min (1.5 mL/Hr) IV Continuous <Continuous>  norepinephrine Infusion 0.05 MICROgram(s)/kG/Min (8.74 mL/Hr) IV Continuous <Continuous>  ondansetron Injectable 4 milliGRAM(s) IV Push once  propofol Infusion 15.004 MICROgram(s)/kG/Min (8.39 mL/Hr) IV Continuous <Continuous>  sodium chloride 0.9%. 1000 milliLiter(s) (150 mL/Hr) IV Continuous <Continuous>  sodium chloride 0.9%. 1000 milliLiter(s) (20 mL/Hr) IV Continuous <Continuous>  vancomycin  IVPB 1000 milliGRAM(s) IV Intermittent every 12 hours    MEDICATIONS  (PRN):  acetaminophen   IVPB .. 1000 milliGRAM(s) IV Intermittent once PRN Moderate Pain (4 - 6)  oxyCODONE    IR 10 milliGRAM(s) Oral every 4 hours PRN Severe Pain (7 - 10)    RADIOLOGY:  Chest X-ray Reviewed    REVIEW OF SYSTEMS:  CONSTITUTIONAL: [X] all negative; [ ] weakness, [ ] fevers, [ ] chills  EYES/ENT: [X] all negative; [ ] visual changes, [ ] vertigo, [ ] throat pain   NECK: [X] all negative; [ ] pain, [ ] stiffness  RESPIRATORY: [] all negative, [ ] cough, [ ] wheezing, [ ] hemoptysis, [ ] shortness of breath  CARDIOVASCULAR: [] all negative; [ ] chest pain, [ ] palpitations, [ ] orthopnea  GASTROINTESTINAL: [X] all negative; [ ]abdominal pain, [ ] nausea, [ ] vomiting, [ ] hematemesis, [ ] diarrhea, [ ] constipation, [ ] melena, [ ] hematochezia.  GENITOURINARY: [X] all negative; [ ] dysuria, [ ] frequency, [ ] hematuria  NEUROLOGICAL: [X] all negative; [ ] numbness, [ ] weakness  SKIN: [X] all negative; [ ] itching, [ ] burning, [ ] rashes, [ ] lesions   All other review of systems is negative unless indicated above.  [  ] Unable to assess ROS because     PHYSICAL EXAM:          CONSTITUTIONAL: Well-developed; well-nourished; in no acute distress.   	SKIN: warm, dry  	HEAD: Normocephalic; atraumatic.  	EYES: PERRL, EOM, no conj injection, sclera clear  	ENT: No nasal discharge; airway clear.  	NECK: Supple; non tender.   	CARD: S1, S2 normal; no murmurs, gallops, or rubs. Regular rate and rhythm.  no carotid bruits  	RESP: CTA B/L; good air movement No wheezes, rales or rhonchi.  	ABD: Soft, not tender, not distended,  no rebound or guarding, bowel sounds present  	EXT: Normal ROM.  No clubbing, cyanosis or edema.   warm and well perfused.  pulses palpable  	NEURO: Alert, awake, motor 5/5 R, 5/5 L

## 2024-10-11 NOTE — BRIEF OPERATIVE NOTE - NSICDXBRIEFPOSTOP_GEN_ALL_CORE_FT
POST-OP DIAGNOSIS:  Syncope 11-Oct-2024 15:22:23  Mitchell Chamorro  Triple vessel coronary artery disease 11-Oct-2024 15:22:18  Mitchell Chamorro  Abnormal stress test 11-Oct-2024 15:22:12  Mitchell Chamorro

## 2024-10-11 NOTE — PRE-OP CHECKLIST - WAS PATIENT ON BETA BLOCKER?
Last Visit Date: 6/20/2022   Next Visit Date: Visit date not found     Pt has new pt appt 5/22/23 with another office but is out of this medication. held the dose as per parameter low HR/No

## 2024-10-11 NOTE — PROGRESS NOTE ADULT - ASSESSMENT
Assessment   s/p CABG x4  POD 0  currently progressing well  extubated shortly after coming from OR   on minimal levophed and A paced at 92     Plan:    Neuro:  Multimodal pain management  Tylenol, Lidoderm patch, gabapentin, opiates as needed  Monitor neuro status in the post op period    CV:  S/P  CABG   Monitor perfusion, , lactate, UOP, index and MVO2 if available  Maintain MAP > 65  ASA, No statin due to elevated LFT   Beta blockers when able  Remove chest tubes when able    Resp:  Acute pulmonary insufficiency post surgery pt came out of OR intubate don vent support      tolerated SBT and extubated  Supplemental oxygen to maintain sats > 92%  Continuous pulse oximetry monitoring  IS / Chest PT  OOBTC and Ambulate  Nebulizers as needed    GI:  Heart healthy diet  Bowel Regimen - escalate as needed  PUD ppx per protocol    Renal  High risk for ARIELLE post surgery  Monitor UOP, lytes, creatinine  Diuretics as needed for negative fluid balance  Keep K > 4, Mg > 2    Endo:  Tight glucose control per CTICU protocol  Insulin gtt as needed  Transition to Lantus / SSI and premeal insulin as needed    Heme:  Acute blood loss anemia post surgery  High risk for thrombocytopenia  DVT prophylaxis once bleeding risk post surgery is minimized    ID:  Perioperative prophylactic abx per protocol  Monitor fever curve and WBC count  Remove TLC when no longer indicated        Upon my evaluation, the above patient has a high probability of imminent or life threatening deterioration due to a high risk for Shock, Cardiogenic shock, arrythmia, acute blood loss, acute kidney injury and acute pulmonary insufficiency which required my direct attention, intervention, and personal management.  Total time was 55 minutes which includes review of radiology results, ordering, interpreting and reviewing diagnostic studies / lab tests with immediate assessment and treatment, consultant collaboration on findings and treatment options, monitoring for potential decompensation, obtaining history from family, EMT, nursing home staff and/or treating physicians; directing the titration of pressors, oxygen support devices, fluid resucitation, interpretation of cardiac output measurements, interpretation of radiological assessments, interpretation of EKG / rhythm strips, telemetry.  This time did not overlap with the management of other patients or performing separately reportable procedures.      Management of this patient was discussed with the CT surgery attending and mid-level providers.    I acknowledge the use of copied documentation.  All copy forward documentation is my own and has been reviewed and revised as appropriate.  If no changes were made, it is because that information remains the same. Assessment   s/p CABG x4  POD 0  NSTMI on admission  currently progressing well  extubated shortly after coming from OR   on minimal levophed and A paced at 92     Plan:    Neuro:  Multimodal pain management  Tylenol, Lidoderm patch, gabapentin, opiates as needed  Monitor neuro status in the post op period    CV:  S/P  CABG     NSTMi on admission 10/5  Monitor perfusion, , lactate, UOP, index and MVO2 if available  Maintain MAP > 65  ASA, No statin due to elevated LFT   Beta blockers when able  Remove chest tubes when able    Resp:  Acute pulmonary insufficiency post surgery pt came out of OR intubate don vent support      tolerated SBT and extubated  Supplemental oxygen to maintain sats > 92%  Continuous pulse oximetry monitoring  IS / Chest PT  OOBTC and Ambulate  Nebulizers as needed    GI:  Heart healthy diet  Bowel Regimen - escalate as needed  PUD ppx per protocol    Renal  High risk for ARIELLE post surgery  Monitor UOP, lytes, creatinine  Diuretics as needed for negative fluid balance  Keep K > 4, Mg > 2    Endo:  Tight glucose control per CTICU protocol  Insulin gtt as needed  Transition to Lantus / SSI and premeal insulin as needed    Heme:  Acute blood loss anemia post surgery  High risk for thrombocytopenia  DVT prophylaxis once bleeding risk post surgery is minimized    ID:  Perioperative prophylactic abx per protocol  Monitor fever curve and WBC count  Remove TLC when no longer indicated        Upon my evaluation, the above patient has a high probability of imminent or life threatening deterioration due to a high risk for Shock, Cardiogenic shock, arrythmia, acute blood loss, acute kidney injury and acute pulmonary insufficiency which required my direct attention, intervention, and personal management.  Total time was 55 minutes which includes review of radiology results, ordering, interpreting and reviewing diagnostic studies / lab tests with immediate assessment and treatment, consultant collaboration on findings and treatment options, monitoring for potential decompensation, obtaining history from family, EMT, nursing home staff and/or treating physicians; directing the titration of pressors, oxygen support devices, fluid resucitation, interpretation of cardiac output measurements, interpretation of radiological assessments, interpretation of EKG / rhythm strips, telemetry.  This time did not overlap with the management of other patients or performing separately reportable procedures.      Management of this patient was discussed with the CT surgery attending and mid-level providers.    I acknowledge the use of copied documentation.  All copy forward documentation is my own and has been reviewed and revised as appropriate.  If no changes were made, it is because that information remains the same. Assessment   s/p CABG x4  POD 0  NSTMI on admission  currently progressing well  extubated shortly after coming from OR   on minimal levophed and A paced at 92     Plan:    Neuro:  Multimodal pain management  Tylenol, Lidoderm patch, gabapentin, opiates as needed  Monitor neuro status in the post op period    CV:  S/P  CABG     NSTMi on admission 10/5  Monitor perfusion, , lactate, UOP, index and MVO2 if available  Hypotension on Levophed  Maintain MAP > 65  ASA, No statin due to elevated LFT   Beta blockers when able  Remove chest tubes when able    Resp:  Acute pulmonary insufficiency post surgery pt came out of OR intubate don vent support      tolerated SBT and extubated  Supplemental oxygen to maintain sats > 92%  Continuous pulse oximetry monitoring  IS / Chest PT  OOBTC and Ambulate  Nebulizers as needed    GI:  Heart healthy diet  Bowel Regimen - escalate as needed  PUD ppx per protocol    Renal  High risk for ARIELLE post surgery  Monitor UOP, lytes, creatinine  Diuretics as needed for negative fluid balance  Keep K > 4, Mg > 2    Endo:  Tight glucose control per CTICU protocol  Insulin gtt as needed  Transition to Lantus / SSI and premeal insulin as needed    Heme:  Acute blood loss anemia post surgery  High risk for thrombocytopenia  DVT prophylaxis once bleeding risk post surgery is minimized    ID:  Perioperative prophylactic abx per protocol  Monitor fever curve and WBC count  Remove TLC when no longer indicated        Upon my evaluation, the above patient has a high probability of imminent or life threatening deterioration due to a high risk for Shock, Cardiogenic shock, arrythmia, acute blood loss, acute kidney injury and acute pulmonary insufficiency which required my direct attention, intervention, and personal management.  Total time was 55 minutes which includes review of radiology results, ordering, interpreting and reviewing diagnostic studies / lab tests with immediate assessment and treatment, consultant collaboration on findings and treatment options, monitoring for potential decompensation, obtaining history from family, EMT, nursing home staff and/or treating physicians; directing the titration of pressors, oxygen support devices, fluid resucitation, interpretation of cardiac output measurements, interpretation of radiological assessments, interpretation of EKG / rhythm strips, telemetry.  This time did not overlap with the management of other patients or performing separately reportable procedures.      Management of this patient was discussed with the CT surgery attending and mid-level providers.    I acknowledge the use of copied documentation.  All copy forward documentation is my own and has been reviewed and revised as appropriate.  If no changes were made, it is because that information remains the same.

## 2024-10-12 LAB
ALBUMIN SERPL ELPH-MCNC: 3.2 G/DL — LOW (ref 3.5–5.2)
ALBUMIN SERPL ELPH-MCNC: 3.4 G/DL — LOW (ref 3.5–5.2)
ALBUMIN SERPL ELPH-MCNC: 3.6 G/DL — SIGNIFICANT CHANGE UP (ref 3.5–5.2)
ALP SERPL-CCNC: 78 U/L — SIGNIFICANT CHANGE UP (ref 30–115)
ALP SERPL-CCNC: 86 U/L — SIGNIFICANT CHANGE UP (ref 30–115)
ALP SERPL-CCNC: 91 U/L — SIGNIFICANT CHANGE UP (ref 30–115)
ALT FLD-CCNC: 106 U/L — HIGH (ref 0–41)
ALT FLD-CCNC: 163 U/L — HIGH (ref 0–41)
ALT FLD-CCNC: 83 U/L — HIGH (ref 0–41)
ANION GAP SERPL CALC-SCNC: 14 MMOL/L — SIGNIFICANT CHANGE UP (ref 7–14)
ANION GAP SERPL CALC-SCNC: 14 MMOL/L — SIGNIFICANT CHANGE UP (ref 7–14)
ANION GAP SERPL CALC-SCNC: 17 MMOL/L — HIGH (ref 7–14)
AST SERPL-CCNC: 163 U/L — HIGH (ref 0–41)
AST SERPL-CCNC: 54 U/L — HIGH (ref 0–41)
AST SERPL-CCNC: 74 U/L — HIGH (ref 0–41)
B-OH-BUTYR SERPL-SCNC: 2.6 MMOL/L — HIGH
BASE EXCESS BLDMV CALC-SCNC: -7 MMOL/L — SIGNIFICANT CHANGE UP
BASE EXCESS BLDV CALC-SCNC: -6 MMOL/L — LOW (ref -2–3)
BASOPHILS # BLD AUTO: 0.03 K/UL — SIGNIFICANT CHANGE UP (ref 0–0.2)
BASOPHILS NFR BLD AUTO: 0.2 % — SIGNIFICANT CHANGE UP (ref 0–1)
BILIRUB SERPL-MCNC: 0.5 MG/DL — SIGNIFICANT CHANGE UP (ref 0.2–1.2)
BILIRUB SERPL-MCNC: 0.6 MG/DL — SIGNIFICANT CHANGE UP (ref 0.2–1.2)
BILIRUB SERPL-MCNC: 0.8 MG/DL — SIGNIFICANT CHANGE UP (ref 0.2–1.2)
BUN SERPL-MCNC: 22 MG/DL — HIGH (ref 10–20)
BUN SERPL-MCNC: 28 MG/DL — HIGH (ref 10–20)
BUN SERPL-MCNC: 30 MG/DL — HIGH (ref 10–20)
CA-I SERPL-SCNC: 1.13 MMOL/L — LOW (ref 1.15–1.33)
CALCIUM SERPL-MCNC: 8 MG/DL — LOW (ref 8.4–10.4)
CALCIUM SERPL-MCNC: 8.2 MG/DL — LOW (ref 8.4–10.5)
CALCIUM SERPL-MCNC: 8.4 MG/DL — SIGNIFICANT CHANGE UP (ref 8.4–10.5)
CHLORIDE SERPL-SCNC: 104 MMOL/L — SIGNIFICANT CHANGE UP (ref 98–110)
CHLORIDE SERPL-SCNC: 105 MMOL/L — SIGNIFICANT CHANGE UP (ref 98–110)
CHLORIDE SERPL-SCNC: 106 MMOL/L — SIGNIFICANT CHANGE UP (ref 98–110)
CO2 SERPL-SCNC: 18 MMOL/L — SIGNIFICANT CHANGE UP (ref 17–32)
CO2 SERPL-SCNC: 18 MMOL/L — SIGNIFICANT CHANGE UP (ref 17–32)
CO2 SERPL-SCNC: 22 MMOL/L — SIGNIFICANT CHANGE UP (ref 17–32)
CREAT SERPL-MCNC: 1.2 MG/DL — SIGNIFICANT CHANGE UP (ref 0.7–1.5)
CREAT SERPL-MCNC: 1.4 MG/DL — SIGNIFICANT CHANGE UP (ref 0.7–1.5)
CREAT SERPL-MCNC: 1.4 MG/DL — SIGNIFICANT CHANGE UP (ref 0.7–1.5)
EGFR: 50 ML/MIN/1.73M2 — LOW
EGFR: 50 ML/MIN/1.73M2 — LOW
EGFR: 60 ML/MIN/1.73M2 — SIGNIFICANT CHANGE UP
EOSINOPHIL # BLD AUTO: 0 K/UL — SIGNIFICANT CHANGE UP (ref 0–0.7)
EOSINOPHIL NFR BLD AUTO: 0 % — SIGNIFICANT CHANGE UP (ref 0–8)
GAS PNL BLDA: SIGNIFICANT CHANGE UP
GAS PNL BLDMV: SIGNIFICANT CHANGE UP
GAS PNL BLDV: 135 MMOL/L — LOW (ref 136–145)
GAS PNL BLDV: SIGNIFICANT CHANGE UP
GLUCOSE BLDC GLUCOMTR-MCNC: 115 MG/DL — HIGH (ref 70–99)
GLUCOSE BLDC GLUCOMTR-MCNC: 116 MG/DL — HIGH (ref 70–99)
GLUCOSE BLDC GLUCOMTR-MCNC: 121 MG/DL — HIGH (ref 70–99)
GLUCOSE BLDC GLUCOMTR-MCNC: 127 MG/DL — HIGH (ref 70–99)
GLUCOSE BLDC GLUCOMTR-MCNC: 137 MG/DL — HIGH (ref 70–99)
GLUCOSE BLDC GLUCOMTR-MCNC: 154 MG/DL — HIGH (ref 70–99)
GLUCOSE BLDC GLUCOMTR-MCNC: 160 MG/DL — HIGH (ref 70–99)
GLUCOSE BLDC GLUCOMTR-MCNC: 183 MG/DL — HIGH (ref 70–99)
GLUCOSE BLDC GLUCOMTR-MCNC: 195 MG/DL — HIGH (ref 70–99)
GLUCOSE BLDC GLUCOMTR-MCNC: 200 MG/DL — HIGH (ref 70–99)
GLUCOSE BLDC GLUCOMTR-MCNC: 240 MG/DL — HIGH (ref 70–99)
GLUCOSE SERPL-MCNC: 114 MG/DL — HIGH (ref 70–99)
GLUCOSE SERPL-MCNC: 157 MG/DL — HIGH (ref 70–99)
GLUCOSE SERPL-MCNC: 199 MG/DL — HIGH (ref 70–99)
HAV IGM SER-ACNC: SIGNIFICANT CHANGE UP
HBV CORE IGM SER-ACNC: SIGNIFICANT CHANGE UP
HBV SURFACE AG SER-ACNC: SIGNIFICANT CHANGE UP
HCO3 BLDMV-SCNC: 19 MMOL/L — LOW (ref 20–28)
HCO3 BLDV-SCNC: 20 MMOL/L — LOW (ref 22–29)
HCT VFR BLD CALC: 33.1 % — LOW (ref 42–52)
HCT VFR BLDA CALC: 32 % — LOW (ref 39–51)
HCV AB S/CO SERPL IA: 0.04 S/CO — SIGNIFICANT CHANGE UP (ref 0–0.99)
HCV AB SERPL-IMP: SIGNIFICANT CHANGE UP
HGB BLD CALC-MCNC: 10.8 G/DL — LOW (ref 12.6–17.4)
HGB BLD-MCNC: 10.8 G/DL — LOW (ref 14–18)
HIV 1+2 AB+HIV1 P24 AG SERPL QL IA: SIGNIFICANT CHANGE UP
IMM GRANULOCYTES NFR BLD AUTO: 0.9 % — HIGH (ref 0.1–0.3)
LACTATE BLDV-MCNC: 1.1 MMOL/L — SIGNIFICANT CHANGE UP (ref 0.5–2)
LACTATE SERPL-SCNC: 1.2 MMOL/L — SIGNIFICANT CHANGE UP (ref 0.7–2)
LYMPHOCYTES # BLD AUTO: 0.76 K/UL — LOW (ref 1.2–3.4)
LYMPHOCYTES # BLD AUTO: 4 % — LOW (ref 20.5–51.1)
MAGNESIUM SERPL-MCNC: 1.7 MG/DL — LOW (ref 1.8–2.4)
MCHC RBC-ENTMCNC: 31 PG — SIGNIFICANT CHANGE UP (ref 27–31)
MCHC RBC-ENTMCNC: 32.6 G/DL — SIGNIFICANT CHANGE UP (ref 32–37)
MCV RBC AUTO: 95.1 FL — HIGH (ref 80–94)
MONOCYTES # BLD AUTO: 0.82 K/UL — HIGH (ref 0.1–0.6)
MONOCYTES NFR BLD AUTO: 4.4 % — SIGNIFICANT CHANGE UP (ref 1.7–9.3)
NEUTROPHILS # BLD AUTO: 17.01 K/UL — HIGH (ref 1.4–6.5)
NEUTROPHILS NFR BLD AUTO: 90.5 % — HIGH (ref 42.2–75.2)
NRBC # BLD: 0 /100 WBCS — SIGNIFICANT CHANGE UP (ref 0–0)
O2 CT VFR BLD CALC: 41 MMHG — SIGNIFICANT CHANGE UP (ref 30–65)
PCO2 BLDMV: 40 MMHG — SIGNIFICANT CHANGE UP (ref 30–65)
PCO2 BLDV: 38 MMHG — LOW (ref 42–55)
PH BLDMV: 7.29 — SIGNIFICANT CHANGE UP
PH BLDV: 7.32 — SIGNIFICANT CHANGE UP (ref 7.32–7.43)
PLATELET # BLD AUTO: 212 K/UL — SIGNIFICANT CHANGE UP (ref 130–400)
PMV BLD: 10.5 FL — HIGH (ref 7.4–10.4)
PO2 BLDV: 41 MMHG — SIGNIFICANT CHANGE UP (ref 25–45)
POTASSIUM BLDV-SCNC: 4.2 MMOL/L — SIGNIFICANT CHANGE UP (ref 3.5–5.1)
POTASSIUM SERPL-MCNC: 4 MMOL/L — SIGNIFICANT CHANGE UP (ref 3.5–5)
POTASSIUM SERPL-MCNC: 4.5 MMOL/L — SIGNIFICANT CHANGE UP (ref 3.5–5)
POTASSIUM SERPL-MCNC: 4.7 MMOL/L — SIGNIFICANT CHANGE UP (ref 3.5–5)
POTASSIUM SERPL-SCNC: 4 MMOL/L — SIGNIFICANT CHANGE UP (ref 3.5–5)
POTASSIUM SERPL-SCNC: 4.5 MMOL/L — SIGNIFICANT CHANGE UP (ref 3.5–5)
POTASSIUM SERPL-SCNC: 4.7 MMOL/L — SIGNIFICANT CHANGE UP (ref 3.5–5)
PROT SERPL-MCNC: 4.8 G/DL — LOW (ref 6–8)
PROT SERPL-MCNC: 5.1 G/DL — LOW (ref 6–8)
PROT SERPL-MCNC: 5.2 G/DL — LOW (ref 6–8)
RBC # BLD: 3.48 M/UL — LOW (ref 4.7–6.1)
RBC # FLD: 13.6 % — SIGNIFICANT CHANGE UP (ref 11.5–14.5)
SAO2 % BLDMV: 67.5 — SIGNIFICANT CHANGE UP (ref 60–90)
SAO2 % BLDV: 70.3 % — SIGNIFICANT CHANGE UP (ref 67–88)
SODIUM SERPL-SCNC: 136 MMOL/L — SIGNIFICANT CHANGE UP (ref 135–146)
SODIUM SERPL-SCNC: 140 MMOL/L — SIGNIFICANT CHANGE UP (ref 135–146)
SODIUM SERPL-SCNC: 142 MMOL/L — SIGNIFICANT CHANGE UP (ref 135–146)
WBC # BLD: 18.78 K/UL — HIGH (ref 4.8–10.8)
WBC # FLD AUTO: 18.78 K/UL — HIGH (ref 4.8–10.8)

## 2024-10-12 PROCEDURE — 93010 ELECTROCARDIOGRAM REPORT: CPT

## 2024-10-12 PROCEDURE — 71045 X-RAY EXAM CHEST 1 VIEW: CPT | Mod: 26

## 2024-10-12 PROCEDURE — 99233 SBSQ HOSP IP/OBS HIGH 50: CPT

## 2024-10-12 RX ORDER — SODIUM CHLORIDE IRRIG SOLUTION 0.9 %
1000 SOLUTION, IRRIGATION IRRIGATION
Refills: 0 | Status: DISCONTINUED | OUTPATIENT
Start: 2024-10-12 | End: 2024-10-12

## 2024-10-12 RX ORDER — PANTOPRAZOLE SODIUM 40 MG/1
40 TABLET, DELAYED RELEASE ORAL
Refills: 0 | Status: DISCONTINUED | OUTPATIENT
Start: 2024-10-12 | End: 2024-10-16

## 2024-10-12 RX ORDER — INSULIN LISPRO 100/ML
VIAL (ML) SUBCUTANEOUS
Refills: 0 | Status: DISCONTINUED | OUTPATIENT
Start: 2024-10-12 | End: 2024-10-12

## 2024-10-12 RX ORDER — INSULIN REGULAR, HUMAN 100/ML
1 VIAL (ML) INJECTION
Qty: 100 | Refills: 0 | Status: DISCONTINUED | OUTPATIENT
Start: 2024-10-12 | End: 2024-10-16

## 2024-10-12 RX ORDER — INSULIN GLARGINE 300 U/ML
10 INJECTION, SOLUTION SUBCUTANEOUS EVERY MORNING
Refills: 0 | Status: DISCONTINUED | OUTPATIENT
Start: 2024-10-12 | End: 2024-10-12

## 2024-10-12 RX ORDER — SODIUM BICARBONATE 650 MG
50 TABLET ORAL ONCE
Refills: 0 | Status: COMPLETED | OUTPATIENT
Start: 2024-10-12 | End: 2024-10-12

## 2024-10-12 RX ORDER — MAGNESIUM SULFATE 500 MG/ML
1 VIAL (ML) INJECTION EVERY 12 HOURS
Refills: 0 | Status: DISCONTINUED | OUTPATIENT
Start: 2024-10-12 | End: 2024-10-12

## 2024-10-12 RX ORDER — INSULIN LISPRO 100/ML
VIAL (ML) SUBCUTANEOUS AT BEDTIME
Refills: 0 | Status: DISCONTINUED | OUTPATIENT
Start: 2024-10-12 | End: 2024-10-12

## 2024-10-12 RX ORDER — GLUCAGON INJECTION, SOLUTION 0.5 MG/.1ML
1 INJECTION, SOLUTION SUBCUTANEOUS ONCE
Refills: 0 | Status: DISCONTINUED | OUTPATIENT
Start: 2024-10-12 | End: 2024-10-12

## 2024-10-12 RX ORDER — METOPROLOL TARTRATE 50 MG
12.5 TABLET ORAL EVERY 12 HOURS
Refills: 0 | Status: DISCONTINUED | OUTPATIENT
Start: 2024-10-12 | End: 2024-10-12

## 2024-10-12 RX ORDER — METOPROLOL TARTRATE 50 MG
25 TABLET ORAL
Refills: 0 | Status: DISCONTINUED | OUTPATIENT
Start: 2024-10-12 | End: 2024-10-16

## 2024-10-12 RX ORDER — SODIUM CHLORIDE IRRIG SOLUTION 0.9 %
1000 SOLUTION, IRRIGATION IRRIGATION
Refills: 0 | Status: DISCONTINUED | OUTPATIENT
Start: 2024-10-12 | End: 2024-10-13

## 2024-10-12 RX ORDER — ALCOHOL ANTISEPTIC PADS
15 PADS, MEDICATED (EA) TOPICAL ONCE
Refills: 0 | Status: DISCONTINUED | OUTPATIENT
Start: 2024-10-12 | End: 2024-10-12

## 2024-10-12 RX ADMIN — Medication 5000 UNIT(S): at 17:03

## 2024-10-12 RX ADMIN — Medication 1 UNIT(S)/HR: at 16:35

## 2024-10-12 RX ADMIN — Medication 100 MILLIGRAM(S): at 01:12

## 2024-10-12 RX ADMIN — VANCOMYCIN HCL-SODIUM CHLORIDE IV SOLN 1.5 GM/250ML-0.9% 250 MILLIGRAM(S): 1.5-0.9/25 SOLUTION at 05:39

## 2024-10-12 RX ADMIN — Medication 325 MILLIGRAM(S): at 11:36

## 2024-10-12 RX ADMIN — PANTOPRAZOLE SODIUM 40 MILLIGRAM(S): 40 TABLET, DELAYED RELEASE ORAL at 11:36

## 2024-10-12 RX ADMIN — Medication 100 MILLILITER(S): at 16:35

## 2024-10-12 RX ADMIN — Medication 250 MILLILITER(S): at 14:45

## 2024-10-12 RX ADMIN — Medication 25 MILLIGRAM(S): at 17:07

## 2024-10-12 RX ADMIN — Medication 100 MILLIGRAM(S): at 11:36

## 2024-10-12 RX ADMIN — Medication 20 MILLIGRAM(S): at 05:38

## 2024-10-12 RX ADMIN — Medication 2 TABLET(S): at 21:32

## 2024-10-12 RX ADMIN — Medication 100 GRAM(S): at 05:38

## 2024-10-12 RX ADMIN — INSULIN GLARGINE 10 UNIT(S): 300 INJECTION, SOLUTION SUBCUTANEOUS at 11:36

## 2024-10-12 RX ADMIN — BISACODYL 5 MILLIGRAM(S): 5 TABLET, COATED ORAL at 17:03

## 2024-10-12 RX ADMIN — BISACODYL 5 MILLIGRAM(S): 5 TABLET, COATED ORAL at 10:15

## 2024-10-12 RX ADMIN — VANCOMYCIN HCL-SODIUM CHLORIDE IV SOLN 1.5 GM/250ML-0.9% 250 MILLIGRAM(S): 1.5-0.9/25 SOLUTION at 17:03

## 2024-10-12 RX ADMIN — CHLORHEXIDINE GLUCONATE ORAL RINSE 15 MILLILITER(S): 1.2 SOLUTION DENTAL at 05:50

## 2024-10-12 RX ADMIN — Medication 100 MILLIGRAM(S): at 17:03

## 2024-10-12 RX ADMIN — Medication 50 MILLIEQUIVALENT(S): at 15:01

## 2024-10-12 RX ADMIN — Medication 500 MICROGRAM(S): at 13:39

## 2024-10-12 RX ADMIN — Medication 12.5 MILLIGRAM(S): at 10:15

## 2024-10-12 RX ADMIN — Medication 17 GRAM(S): at 12:22

## 2024-10-12 RX ADMIN — Medication 100 MILLIGRAM(S): at 10:15

## 2024-10-12 RX ADMIN — CHLORHEXIDINE GLUCONATE ORAL RINSE 1 APPLICATION(S): 1.2 SOLUTION DENTAL at 21:32

## 2024-10-12 NOTE — PROGRESS NOTE ADULT - SUBJECTIVE AND OBJECTIVE BOX
CTU Attending Progress Daily Note     12 Oct 2024 07:44    Procedure:                                                  POD#                   Patient seen as post-op critical care follow-up    HPI:  84 years old male patient with pmh of HLD, DM, HTN brought to the ed for fall. Hx taken from the patient and is aox3 at time of the interview.  Patient states that he was going down to his basement, and when he got there the next thing he remembers is lying himself on the floor on his back. He does not know wether he lost consciousness or not not but thinks that he lost conciousnes as he does not know what actually happened and how he found himself on the floor. On further inquiry, patient states that he was trying to get up from the floor then he felt that his left shoulder is weak, painful and stiff. on trying to get up from the ground, he state that he hit his head against the ground .  He was finally able to gain his strength back and get up, he went up to his room and slept. His sons called his PCP who advised him to go to the ER. Further review of the systems is negative for any headache, light headiness dizziness palpitations, chest pain, nausea, vomit, diarrhea, dysuria , frequency, urgency, extremity weakness and other significant complaints.                (05 Oct 2024 02:37)    See preop testing chart H&P    Interval event for past 24 hr:  ALEX GROVER  84y had no event.     Current Complains:  ROXYPATRIAALEX has no new complaints    REVIEW OF SYSTEMS:  CONSTITUTIONAL:  [-] weakness, [-] fevers, [-] chills  EYES/ENT: [-] visual changes, [-] vertigo, [-] throat pain   NECK: [-] pain, [-] stiffness  RESPIRATORY: [-] cough, [-] wheezing, [-] hemoptysis, [-] shortness of breath  CARDIOVASCULAR: [-] chest pain, [-] palpitations, [-] orthopnea  GASTROINTESTINAL:    [-]abdominal pain, [-] nausea, [-] vomiting, [-] hematemesis, [-] diarrhea, [-] constipation, [-] melena, [-] hematochezia.  GENITOURINARY: [-] dysuria, [-] frequency, [-] hematuria  NEUROLOGICAL: [-] numbness, [-] weakness  SKIN: [-] itching, [-] burning, [-] rashes, [-] lesions   All other review of systems is negative unless indicated above.    [  ] Unable to assess ROS because :    OBJECTIVE:  ICU Vital Signs Last 24 Hrs  T(C): 37.6 (12 Oct 2024 07:00), Max: 37.7 (11 Oct 2024 22:00)  T(F): 99.7 (12 Oct 2024 07:00), Max: 99.9 (11 Oct 2024 22:00)  HR: 76 (12 Oct 2024 07:00) (76 - 93)  BP: 128/64 (12 Oct 2024 06:00) (94/55 - 128/64)  BP(mean): 87 (12 Oct 2024 06:00) (69 - 90)  ABP: 110/53 (12 Oct 2024 07:00) (77/43 - 141/66)  ABP(mean): 68 (12 Oct 2024 07:00) (53 - 90)  RR: 19 (12 Oct 2024 07:00) (6 - 22)  SpO2: 98% (12 Oct 2024 07:00) (95% - 100%)    O2 Parameters below as of 12 Oct 2024 07:00  Patient On (Oxygen Delivery Method): nasal cannula  O2 Flow (L/min): 4          I&O's Summary    11 Oct 2024 07:01  -  12 Oct 2024 07:00  --------------------------------------------------------  IN: 1492.5 mL / OUT: 1055 mL / NET: 437.5 mL      Adult Advanced Hemodynamics Last 24 Hrs  CVP(mm Hg): 9 (12 Oct 2024 07:00) (2 - 13)  CVP(cm H2O): --  CO: 5.9 (12 Oct 2024 06:00) (5 - 6.5)  CI: 2.8 (12 Oct 2024 06:00) (2.3 - 3.1)  PA: 34/15 (12 Oct 2024 07:00) (22/10 - 40/19)  PA(mean): 22 (12 Oct 2024 07:00) (16 - 27)  PCWP: --  SVR: 799 (12 Oct 2024 06:00) (700 - 1316)  SVRI: 1683 (12 Oct 2024 06:00) (8108 - 0846)  PVR: --  PVRI: --  Mode: CPAP with PS  FiO2: 50  PEEP: 5  PS: 7      PHYSICAL EXAM:  General: WN/WD NAD    HEENT:     [+] NCAT      Neck:         [+] FROM      Chest:         [-] Sternal click   [-] Sternal drainage      Lungs:          [+] CTA  BL    Cardiac:       [+] S1 [+] S2    [+] RRR   [-] Irregular       Abdomen:    [+] BS    [+] Soft    [+] Non-tender     [-] Distended      Extremities:    [+] Pulses     Neuro:        [+] Awake   [+]  Alert       Skin:          [+] Normal incisions         CAPILLARY BLOOD GLUCOSE      POCT Blood Glucose.: 137 mg/dL (12 Oct 2024 06:05)    CAPILLARY BLOOD GLUCOSE      POCT Blood Glucose.: 137 mg/dL (12 Oct 2024 06:05)  POCT Blood Glucose.: 127 mg/dL (12 Oct 2024 02:12)  POCT Blood Glucose.: 115 mg/dL (12 Oct 2024 00:11)  POCT Blood Glucose.: 110 mg/dL (11 Oct 2024 22:09)  POCT Blood Glucose.: 103 mg/dL (11 Oct 2024 21:03)  POCT Blood Glucose.: 118 mg/dL (11 Oct 2024 19:48)  POCT Blood Glucose.: 125 mg/dL (11 Oct 2024 19:00)  POCT Blood Glucose.: 144 mg/dL (11 Oct 2024 18:12)  POCT Blood Glucose.: 157 mg/dL (11 Oct 2024 17:04)  POCT Blood Glucose.: 167 mg/dL (11 Oct 2024 16:05)  POCT Blood Glucose.: 158 mg/dL (11 Oct 2024 14:51)      HOSPITAL MEDICATIONS:  MEDICATIONS  (STANDING):  albumin human  5% IVPB 3000 milliLiter(s) IV Intermittent once  albuterol    90 MICROgram(s) HFA Inhaler 2 Puff(s) Inhalation every 6 hours  aspirin enteric coated 325 milliGRAM(s) Oral daily  aspirin Suppository 300 milliGRAM(s) Rectal once  bisacodyl 5 milliGRAM(s) Oral every 12 hours  ceFAZolin   IVPB 2000 milliGRAM(s) IV Intermittent every 8 hours  chlorhexidine 0.12% Liquid 15 milliLiter(s) Oral Mucosa every 12 hours  chlorhexidine 0.12% Liquid 15 milliLiter(s) Oral Mucosa every 12 hours  chlorhexidine 2% Cloths 1 Application(s) Topical daily  dexMEDEtomidine Infusion 0.25 MICROgram(s)/kG/Hr (5.83 mL/Hr) IV Continuous <Continuous>  dextrose 50% Injectable 50 milliLiter(s) IV Push every 15 minutes  famotidine Injectable 20 milliGRAM(s) IV Push every 12 hours  insulin regular Infusion 1 Unit(s)/Hr (1 mL/Hr) IV Continuous <Continuous>  ipratropium 17 MICROgram(s) HFA Inhaler 2 Puff(s) Inhalation every 6 hours  magnesium sulfate  IVPB 1 Gram(s) IV Intermittent every 12 hours  meperidine     Injectable 25 milliGRAM(s) IV Push once  niCARdipine Infusion 5 mG/Hr (25 mL/Hr) IV Continuous <Continuous>  nitroglycerin  Infusion 5 MICROgram(s)/Min (1.5 mL/Hr) IV Continuous <Continuous>  norepinephrine Infusion 0.05 MICROgram(s)/kG/Min (8.74 mL/Hr) IV Continuous <Continuous>  ondansetron Injectable 4 milliGRAM(s) IV Push once  polyethylene glycol 3350 17 Gram(s) Oral daily  propofol Infusion 15.004 MICROgram(s)/kG/Min (8.39 mL/Hr) IV Continuous <Continuous>  senna 2 Tablet(s) Oral at bedtime  sodium chloride 0.9%. 1000 milliLiter(s) (20 mL/Hr) IV Continuous <Continuous>  sodium chloride 0.9%. 1000 milliLiter(s) (150 mL/Hr) IV Continuous <Continuous>  vancomycin  IVPB 1000 milliGRAM(s) IV Intermittent every 12 hours    MEDICATIONS  (PRN):  acetaminophen     Tablet .. 650 milliGRAM(s) Oral every 6 hours PRN Temp greater or equal to 38C (100.4F), Mild Pain (1 - 3)  acetaminophen   IVPB .. 1000 milliGRAM(s) IV Intermittent once PRN Moderate Pain (4 - 6)  fentaNYL    Injectable 25 MICROGram(s) IV Push every 4 hours PRN Severe Pain (7 - 10)  ondansetron Injectable 4 milliGRAM(s) IV Push every 6 hours PRN Nausea and/or Vomiting  oxyCODONE    IR 10 milliGRAM(s) Oral every 4 hours PRN Severe Pain (7 - 10)  oxyCODONE    IR 5 milliGRAM(s) Oral every 4 hours PRN Moderate Pain (4 - 6)      LABS:  ABG - ( 12 Oct 2024 04:10 )  pH, Arterial: 7.33  pH, Blood: x     /  pCO2: 33    /  pO2: 76    / HCO3: 17    / Base Excess: -7.6  /  SaO2: 98.6                                    10.8   18.78 )-----------( 212      ( 12 Oct 2024 02:00 )             33.1     10-12    136  |  104  |  22[H]  ----------------------------<  114[H]  4.7   |  18  |  1.2    Ca    8.0[L]      12 Oct 2024 02:00  Mg     1.7     10-12    TPro  4.8[L]  /  Alb  3.2[L]  /  TBili  0.8  /  DBili  x   /  AST  163[H]  /  ALT  163[H]  /  AlkPhos  86  10-12    PT/INR - ( 11 Oct 2024 15:04 )   PT: 14.70 sec;   INR: 1.29 ratio         PTT - ( 11 Oct 2024 15:04 )  PTT:26.9 sec  Urinalysis Basic - ( 12 Oct 2024 02:00 )    Color: x / Appearance: x / SG: x / pH: x  Gluc: 114 mg/dL / Ketone: x  / Bili: x / Urobili: x   Blood: x / Protein: x / Nitrite: x   Leuk Esterase: x / RBC: x / WBC x   Sq Epi: x / Non Sq Epi: x / Bacteria: x          RADIOLOGY:  Reviewed and interpreted by me  CXR from 10-12-24 shows [+] mild congestion, [-] pneumothorax, [-] R/L effusion, [-] cardiomegaly,   NGT in place, S-G Catheter in place, R/L TLC in place, R/L Chest Tubes in place    ECG:  Reviewed and interpreted by me:   QTC:    Assessment:      PAST MEDICAL & SURGICAL HISTORY:  DM (diabetes mellitus)      HTN (hypertension)      HLD (hyperlipidemia)      H/O knee surgery          PLAN:  Neuro: Pain control  Pulm: Encourage coughing, deep breathing and use of incentive spirometry. Wean off supplemental oxygen as able. Daily CXR.   Cardio: Monitor telemetry/alarms. Continue cardiac meds  GI: Tolerating diet. Continue stool softeners. Continue GI prophylaxis  Renal: monitor urine output, supplement electrolytes as needed  Vasc: Heparin SC/SCDs for DVT prophylaxis  Heme: Monitor H/H.   ID: Off antibiotics. Stable.  Endocrine: Monitor finger stick blood sugar and control hyperglycemia with insulin  Physical Therapy: OOB/ambulate        Discussed with Cardiothoracic Team at AM rounds.     CTU Attending Progress Daily Note     12 Oct 2024 07:44    Procedure:                      CABG 4 LIMA                            POD#     11              Patient seen as post-op critical care follow-up    HPI:  84 years old male patient with pmh of HLD, DM, HTN brought to the ed for fall. Hx taken from the patient and is aox3 at time of the interview.  Patient states that he was going down to his basement, and when he got there the next thing he remembers is lying himself on the floor on his back. He does not know wether he lost consciousness or not not but thinks that he lost conciousnes as he does not know what actually happened and how he found himself on the floor. On further inquiry, patient states that he was trying to get up from the floor then he felt that his left shoulder is weak, painful and stiff. on trying to get up from the ground, he state that he hit his head against the ground .  He was finally able to gain his strength back and get up, he went up to his room and slept. His sons called his PCP who advised him to go to the ER. Further review of the systems is negative for any headache, light headiness dizziness palpitations, chest pain, nausea, vomit, diarrhea, dysuria , frequency, urgency, extremity weakness and other significant complaints.                (05 Oct 2024 02:37)    See preop testing chart H&P    Interval event for past 24 hr:  ALEX GROVER  84y had no event.     Current Complains:  ALEX GROVER has no new complaints    REVIEW OF SYSTEMS:  CONSTITUTIONAL:  [-] weakness, [-] fevers, [-] chills  EYES/ENT: [-] visual changes, [-] vertigo, [-] throat pain   NECK: [-] pain, [-] stiffness  RESPIRATORY: [-] cough, [-] wheezing, [-] hemoptysis, [-] shortness of breath  CARDIOVASCULAR: [-] chest pain, [-] palpitations, [-] orthopnea  GASTROINTESTINAL:    [-]abdominal pain, [-] nausea, [-] vomiting, [-] hematemesis, [-] diarrhea, [-] constipation, [-] melena, [-] hematochezia.  GENITOURINARY: [-] dysuria, [-] frequency, [-] hematuria  NEUROLOGICAL: [-] numbness, [-] weakness  SKIN: [-] itching, [-] burning, [-] rashes, [-] lesions   All other review of systems is negative unless indicated above.    [  ] Unable to assess ROS because :    OBJECTIVE:  ICU Vital Signs Last 24 Hrs  T(C): 37.6 (12 Oct 2024 07:00), Max: 37.7 (11 Oct 2024 22:00)  T(F): 99.7 (12 Oct 2024 07:00), Max: 99.9 (11 Oct 2024 22:00)  HR: 76 (12 Oct 2024 07:00) (76 - 93)  BP: 128/64 (12 Oct 2024 06:00) (94/55 - 128/64)  BP(mean): 87 (12 Oct 2024 06:00) (69 - 90)  ABP: 110/53 (12 Oct 2024 07:00) (77/43 - 141/66)  ABP(mean): 68 (12 Oct 2024 07:00) (53 - 90)  RR: 19 (12 Oct 2024 07:00) (6 - 22)  SpO2: 98% (12 Oct 2024 07:00) (95% - 100%)    O2 Parameters below as of 12 Oct 2024 07:00  Patient On (Oxygen Delivery Method): nasal cannula  O2 Flow (L/min): 4          I&O's Summary    11 Oct 2024 07:01  -  12 Oct 2024 07:00  --------------------------------------------------------  IN: 1492.5 mL / OUT: 1055 mL / NET: 437.5 mL      Adult Advanced Hemodynamics Last 24 Hrs  CVP(mm Hg): 9 (12 Oct 2024 07:00) (2 - 13)  CVP(cm H2O): --  CO: 5.9 (12 Oct 2024 06:00) (5 - 6.5)  CI: 2.8 (12 Oct 2024 06:00) (2.3 - 3.1)  PA: 34/15 (12 Oct 2024 07:00) (22/10 - 40/19)  PA(mean): 22 (12 Oct 2024 07:00) (16 - 27)  PCWP: --  SVR: 799 (12 Oct 2024 06:00) (700 - 1316)  SVRI: 1683 (12 Oct 2024 06:00) (7681 - 2056)  PVR: --  PVRI: --  Mode: CPAP with PS  FiO2: 50  PEEP: 5  PS: 7      PHYSICAL EXAM:  General: WN/WD NAD    HEENT:     [+] NCAT      Neck:         [+] FROM      Chest:         [-] Sternal click   [-] Sternal drainage      Lungs:          [+] CTA  BL    Cardiac:       [+] S1 [+] S2    [+] RRR   [-] Irregular       Abdomen:    [+] BS    [+] Soft    [+] Non-tender     [-] Distended      Extremities:    [+] Pulses     Neuro:        [+] Awake   [+]  Alert       Skin:          [+] Normal incisions         CAPILLARY BLOOD GLUCOSE      POCT Blood Glucose.: 137 mg/dL (12 Oct 2024 06:05)    CAPILLARY BLOOD GLUCOSE      POCT Blood Glucose.: 137 mg/dL (12 Oct 2024 06:05)  POCT Blood Glucose.: 127 mg/dL (12 Oct 2024 02:12)  POCT Blood Glucose.: 115 mg/dL (12 Oct 2024 00:11)  POCT Blood Glucose.: 110 mg/dL (11 Oct 2024 22:09)  POCT Blood Glucose.: 103 mg/dL (11 Oct 2024 21:03)  POCT Blood Glucose.: 118 mg/dL (11 Oct 2024 19:48)  POCT Blood Glucose.: 125 mg/dL (11 Oct 2024 19:00)  POCT Blood Glucose.: 144 mg/dL (11 Oct 2024 18:12)  POCT Blood Glucose.: 157 mg/dL (11 Oct 2024 17:04)  POCT Blood Glucose.: 167 mg/dL (11 Oct 2024 16:05)  POCT Blood Glucose.: 158 mg/dL (11 Oct 2024 14:51)      HOSPITAL MEDICATIONS:  MEDICATIONS  (STANDING):  albumin human  5% IVPB 3000 milliLiter(s) IV Intermittent once  albuterol    90 MICROgram(s) HFA Inhaler 2 Puff(s) Inhalation every 6 hours  aspirin enteric coated 325 milliGRAM(s) Oral daily  aspirin Suppository 300 milliGRAM(s) Rectal once  bisacodyl 5 milliGRAM(s) Oral every 12 hours  ceFAZolin   IVPB 2000 milliGRAM(s) IV Intermittent every 8 hours  chlorhexidine 0.12% Liquid 15 milliLiter(s) Oral Mucosa every 12 hours  chlorhexidine 0.12% Liquid 15 milliLiter(s) Oral Mucosa every 12 hours  chlorhexidine 2% Cloths 1 Application(s) Topical daily  dexMEDEtomidine Infusion 0.25 MICROgram(s)/kG/Hr (5.83 mL/Hr) IV Continuous <Continuous>  dextrose 50% Injectable 50 milliLiter(s) IV Push every 15 minutes  famotidine Injectable 20 milliGRAM(s) IV Push every 12 hours  insulin regular Infusion 1 Unit(s)/Hr (1 mL/Hr) IV Continuous <Continuous>  ipratropium 17 MICROgram(s) HFA Inhaler 2 Puff(s) Inhalation every 6 hours  magnesium sulfate  IVPB 1 Gram(s) IV Intermittent every 12 hours  meperidine     Injectable 25 milliGRAM(s) IV Push once  niCARdipine Infusion 5 mG/Hr (25 mL/Hr) IV Continuous <Continuous>  nitroglycerin  Infusion 5 MICROgram(s)/Min (1.5 mL/Hr) IV Continuous <Continuous>  norepinephrine Infusion 0.05 MICROgram(s)/kG/Min (8.74 mL/Hr) IV Continuous <Continuous>  ondansetron Injectable 4 milliGRAM(s) IV Push once  polyethylene glycol 3350 17 Gram(s) Oral daily  propofol Infusion 15.004 MICROgram(s)/kG/Min (8.39 mL/Hr) IV Continuous <Continuous>  senna 2 Tablet(s) Oral at bedtime  sodium chloride 0.9%. 1000 milliLiter(s) (20 mL/Hr) IV Continuous <Continuous>  sodium chloride 0.9%. 1000 milliLiter(s) (150 mL/Hr) IV Continuous <Continuous>  vancomycin  IVPB 1000 milliGRAM(s) IV Intermittent every 12 hours    MEDICATIONS  (PRN):  acetaminophen     Tablet .. 650 milliGRAM(s) Oral every 6 hours PRN Temp greater or equal to 38C (100.4F), Mild Pain (1 - 3)  acetaminophen   IVPB .. 1000 milliGRAM(s) IV Intermittent once PRN Moderate Pain (4 - 6)  fentaNYL    Injectable 25 MICROGram(s) IV Push every 4 hours PRN Severe Pain (7 - 10)  ondansetron Injectable 4 milliGRAM(s) IV Push every 6 hours PRN Nausea and/or Vomiting  oxyCODONE    IR 10 milliGRAM(s) Oral every 4 hours PRN Severe Pain (7 - 10)  oxyCODONE    IR 5 milliGRAM(s) Oral every 4 hours PRN Moderate Pain (4 - 6)      LABS:  ABG - ( 12 Oct 2024 04:10 )  pH, Arterial: 7.33  pH, Blood: x     /  pCO2: 33    /  pO2: 76    / HCO3: 17    / Base Excess: -7.6  /  SaO2: 98.6                                    10.8   18.78 )-----------( 212      ( 12 Oct 2024 02:00 )             33.1     10-12    136  |  104  |  22[H]  ----------------------------<  114[H]  4.7   |  18  |  1.2    Ca    8.0[L]      12 Oct 2024 02:00  Mg     1.7     10-12    TPro  4.8[L]  /  Alb  3.2[L]  /  TBili  0.8  /  DBili  x   /  AST  163[H]  /  ALT  163[H]  /  AlkPhos  86  10-12    PT/INR - ( 11 Oct 2024 15:04 )   PT: 14.70 sec;   INR: 1.29 ratio         PTT - ( 11 Oct 2024 15:04 )  PTT:26.9 sec  Urinalysis Basic - ( 12 Oct 2024 02:00 )    Color: x / Appearance: x / SG: x / pH: x  Gluc: 114 mg/dL / Ketone: x  / Bili: x / Urobili: x   Blood: x / Protein: x / Nitrite: x   Leuk Esterase: x / RBC: x / WBC x   Sq Epi: x / Non Sq Epi: x / Bacteria: x          RADIOLOGY:  Reviewed and interpreted by me  CXR from 10-12-24 shows [+] mild congestion, [-] pneumothorax, [-] R/L effusion, [-] cardiomegaly,   NGT in place, S-G Catheter in place, R/L TLC in place, R/L Chest Tubes in place    ECG:  Reviewed and interpreted by me:   QTC:    Assessment:  CAD SP CABG    PAST MEDICAL & SURGICAL HISTORY:  DM (diabetes mellitus)      HTN (hypertension)      HLD (hyperlipidemia)      H/O knee surgery          PLAN:  Neuro: Pain control  Pulm: Encourage coughing, deep breathing and use of incentive spirometry. Wean off supplemental oxygen as able. Daily CXR.   Cardio: Monitor telemetry/alarms. Continue cardiac meds  GI: Tolerating diet. Continue stool softeners. Continue GI prophylaxis  Renal: monitor urine output, supplement electrolytes as needed  Vasc: Heparin SC/SCDs for DVT prophylaxis  Heme: Monitor H/H.   ID: Off antibiotics. Stable.  Endocrine: Monitor finger stick blood sugar and control hyperglycemia with insulin  Physical Therapy: OOB/ambulate        Discussed with Cardiothoracic Team at AM rounds.     CTU Attending Progress Daily Note     12 Oct 2024 08:31    Procedure:                   CABG 4 LIMA                               POD#     1              Patient seen as post-op critical care follow-up    HPI:  84 years old male patient with pmh of HLD, DM, HTN brought to the ed for fall. Hx taken from the patient and is aox3 at time of the interview.  Patient states that he was going down to his basement, and when he got there the next thing he remembers is lying himself on the floor on his back. He does not know wether he lost consciousness or not not but thinks that he lost conciousnes as he does not know what actually happened and how he found himself on the floor. On further inquiry, patient states that he was trying to get up from the floor then he felt that his left shoulder is weak, painful and stiff. on trying to get up from the ground, he state that he hit his head against the ground .  He was finally able to gain his strength back and get up, he went up to his room and slept. His sons called his PCP who advised him to go to the ER. Further review of the systems is negative for any headache, light headiness dizziness palpitations, chest pain, nausea, vomit, diarrhea, dysuria , frequency, urgency, extremity weakness and other significant complaints.                (05 Oct 2024 02:37)    See preop testing chart H&P    Interval event for past 24 hr:  ALEX GROVER  84y had no event.     Current Complains:  ALEX GROVER has no new complaints    REVIEW OF SYSTEMS:  CONSTITUTIONAL:  [-] weakness, [-] fevers, [-] chills  EYES/ENT: [-] visual changes, [-] vertigo, [-] throat pain   NECK: [-] pain, [-] stiffness  RESPIRATORY: [-] cough, [-] wheezing, [-] hemoptysis, [-] shortness of breath  CARDIOVASCULAR: [-] chest pain, [-] palpitations, [-] orthopnea  GASTROINTESTINAL:    [-]abdominal pain, [-] nausea, [-] vomiting, [-] hematemesis, [-] diarrhea, [-] constipation, [-] melena, [-] hematochezia.  GENITOURINARY: [-] dysuria, [-] frequency, [-] hematuria  NEUROLOGICAL: [-] numbness, [-] weakness  SKIN: [-] itching, [-] burning, [-] rashes, [-] lesions   All other review of systems is negative unless indicated above.    [  ] Unable to assess ROS because :    OBJECTIVE:  ICU Vital Signs Last 24 Hrs  T(C): 37.6 (12 Oct 2024 07:00), Max: 37.7 (11 Oct 2024 22:00)  T(F): 99.7 (12 Oct 2024 07:00), Max: 99.9 (11 Oct 2024 22:00)  HR: 76 (12 Oct 2024 07:00) (76 - 93)  BP: 128/64 (12 Oct 2024 06:00) (94/55 - 128/64)  BP(mean): 87 (12 Oct 2024 06:00) (69 - 90)  ABP: 110/53 (12 Oct 2024 07:00) (77/43 - 141/66)  ABP(mean): 68 (12 Oct 2024 07:00) (53 - 90)  RR: 19 (12 Oct 2024 07:00) (6 - 22)  SpO2: 98% (12 Oct 2024 07:00) (95% - 100%)    O2 Parameters below as of 12 Oct 2024 07:00  Patient On (Oxygen Delivery Method): nasal cannula  O2 Flow (L/min): 4          I&O's Summary    11 Oct 2024 07:01  -  12 Oct 2024 07:00  --------------------------------------------------------  IN: 1492.5 mL / OUT: 1055 mL / NET: 437.5 mL    12 Oct 2024 07:01  -  12 Oct 2024 08:31  --------------------------------------------------------  IN: 23 mL / OUT: 56 mL / NET: -33 mL      Adult Advanced Hemodynamics Last 24 Hrs  CVP(mm Hg): 9 (12 Oct 2024 07:00) (2 - 13)  CVP(cm H2O): --  CO: 5.9 (12 Oct 2024 06:00) (5 - 6.5)  CI: 2.8 (12 Oct 2024 06:00) (2.3 - 3.1)  PA: 34/15 (12 Oct 2024 07:00) (22/10 - 40/19)  PA(mean): 22 (12 Oct 2024 07:00) (16 - 27)  PCWP: --  SVR: 799 (12 Oct 2024 06:00) (700 - 1316)  SVRI: 1683 (12 Oct 2024 06:00) (1469 - 5816)  PVR: --  PVRI: --  Mode: CPAP with PS  FiO2: 50  PEEP: 5  PS: 7      PHYSICAL EXAM:  General: WN/WD NAD  HEENT:     [+] NCAT  [+] EOMI  [-] Conjuctival edema   [-] Icterus   [-] Thrush   [-] ETT  [-] NGT/OGT  Neck:         [+] FROM   [+] JVD     [-] Nodes     [-] Masses    [+] Mid-line trachea    [-] Tracheostomy  Chest:         [-] Sternal click   [-] Sternal drainage   [+] Pacing wires   [+] Chest tubes   [-] SubQ emphysema  Lungs:          [+] CTA   [-] Rhonchi   [-] Rales    [-] Wheezing    [-] Decreased BS    [-] Dullness R L  Cardiac:       [+] S1 [+] S2    [+] RRR   [-] Irregular   [-] S3   [-] S4    [-] Murmurs    [-] Rub  Abdomen:    [+] BS    [+] Soft    [+] Non-tender     [-] Distended    [-] Organomegaly  [-] PEG  Extremities:   [-] Cyanosis U/L   [-] Clubbing  U/L  [-] LE/UE Edema   [+] Capillary refill    [+] Pulses   Neuro:        [+] Awake   [+]  Alert   [-] Confused   [-] Lethargic   [-] Sedated   [-] Generalized Weakness  Skin:        [-] Rashes    [-] Erythema   [+] Normal incisions   [+] IV sites intact   [-] Sacral decubitus      CAPILLARY BLOOD GLUCOSE      POCT Blood Glucose.: 154 mg/dL (12 Oct 2024 08:08)    CAPILLARY BLOOD GLUCOSE      POCT Blood Glucose.: 154 mg/dL (12 Oct 2024 08:08)  POCT Blood Glucose.: 137 mg/dL (12 Oct 2024 06:05)  POCT Blood Glucose.: 127 mg/dL (12 Oct 2024 02:12)  POCT Blood Glucose.: 115 mg/dL (12 Oct 2024 00:11)  POCT Blood Glucose.: 110 mg/dL (11 Oct 2024 22:09)  POCT Blood Glucose.: 103 mg/dL (11 Oct 2024 21:03)  POCT Blood Glucose.: 118 mg/dL (11 Oct 2024 19:48)  POCT Blood Glucose.: 125 mg/dL (11 Oct 2024 19:00)  POCT Blood Glucose.: 144 mg/dL (11 Oct 2024 18:12)  POCT Blood Glucose.: 157 mg/dL (11 Oct 2024 17:04)  POCT Blood Glucose.: 167 mg/dL (11 Oct 2024 16:05)  POCT Blood Glucose.: 158 mg/dL (11 Oct 2024 14:51)      HOSPITAL MEDICATIONS:  MEDICATIONS  (STANDING):  albumin human  5% IVPB 3000 milliLiter(s) IV Intermittent once  albuterol    90 MICROgram(s) HFA Inhaler 2 Puff(s) Inhalation every 6 hours  aspirin enteric coated 325 milliGRAM(s) Oral daily  aspirin Suppository 300 milliGRAM(s) Rectal once  bisacodyl 5 milliGRAM(s) Oral every 12 hours  ceFAZolin   IVPB 2000 milliGRAM(s) IV Intermittent every 8 hours  chlorhexidine 0.12% Liquid 15 milliLiter(s) Oral Mucosa every 12 hours  chlorhexidine 0.12% Liquid 15 milliLiter(s) Oral Mucosa every 12 hours  chlorhexidine 2% Cloths 1 Application(s) Topical daily  dexMEDEtomidine Infusion 0.25 MICROgram(s)/kG/Hr (5.83 mL/Hr) IV Continuous <Continuous>  dextrose 50% Injectable 50 milliLiter(s) IV Push every 15 minutes  famotidine Injectable 20 milliGRAM(s) IV Push every 12 hours  insulin regular Infusion 1 Unit(s)/Hr (1 mL/Hr) IV Continuous <Continuous>  ipratropium 17 MICROgram(s) HFA Inhaler 2 Puff(s) Inhalation every 6 hours  magnesium sulfate  IVPB 1 Gram(s) IV Intermittent every 12 hours  meperidine     Injectable 25 milliGRAM(s) IV Push once  niCARdipine Infusion 5 mG/Hr (25 mL/Hr) IV Continuous <Continuous>  nitroglycerin  Infusion 5 MICROgram(s)/Min (1.5 mL/Hr) IV Continuous <Continuous>  norepinephrine Infusion 0.05 MICROgram(s)/kG/Min (8.74 mL/Hr) IV Continuous <Continuous>  ondansetron Injectable 4 milliGRAM(s) IV Push once  polyethylene glycol 3350 17 Gram(s) Oral daily  propofol Infusion 15.004 MICROgram(s)/kG/Min (8.39 mL/Hr) IV Continuous <Continuous>  senna 2 Tablet(s) Oral at bedtime  sodium chloride 0.9%. 1000 milliLiter(s) (20 mL/Hr) IV Continuous <Continuous>  sodium chloride 0.9%. 1000 milliLiter(s) (150 mL/Hr) IV Continuous <Continuous>  vancomycin  IVPB 1000 milliGRAM(s) IV Intermittent every 12 hours    MEDICATIONS  (PRN):  acetaminophen     Tablet .. 650 milliGRAM(s) Oral every 6 hours PRN Temp greater or equal to 38C (100.4F), Mild Pain (1 - 3)  acetaminophen   IVPB .. 1000 milliGRAM(s) IV Intermittent once PRN Moderate Pain (4 - 6)  fentaNYL    Injectable 25 MICROGram(s) IV Push every 4 hours PRN Severe Pain (7 - 10)  ondansetron Injectable 4 milliGRAM(s) IV Push every 6 hours PRN Nausea and/or Vomiting  oxyCODONE    IR 10 milliGRAM(s) Oral every 4 hours PRN Severe Pain (7 - 10)  oxyCODONE    IR 5 milliGRAM(s) Oral every 4 hours PRN Moderate Pain (4 - 6)      LABS:  ABG - ( 12 Oct 2024 04:10 )  pH, Arterial: 7.33  pH, Blood: x     /  pCO2: 33    /  pO2: 76    / HCO3: 17    / Base Excess: -7.6  /  SaO2: 98.6                                    10.8   18.78 )-----------( 212      ( 12 Oct 2024 02:00 )             33.1     10-12    136  |  104  |  22[H]  ----------------------------<  114[H]  4.7   |  18  |  1.2    Ca    8.0[L]      12 Oct 2024 02:00  Mg     1.7     10-12    TPro  4.8[L]  /  Alb  3.2[L]  /  TBili  0.8  /  DBili  x   /  AST  163[H]  /  ALT  163[H]  /  AlkPhos  86  10-12    PT/INR - ( 11 Oct 2024 15:04 )   PT: 14.70 sec;   INR: 1.29 ratio         PTT - ( 11 Oct 2024 15:04 )  PTT:26.9 sec  Urinalysis Basic - ( 12 Oct 2024 02:00 )    Color: x / Appearance: x / SG: x / pH: x  Gluc: 114 mg/dL / Ketone: x  / Bili: x / Urobili: x   Blood: x / Protein: x / Nitrite: x   Leuk Esterase: x / RBC: x / WBC x   Sq Epi: x / Non Sq Epi: x / Bacteria: x          RADIOLOGY:  Reviewed and interpreted by me  CXR from 10-12-24 shows [+] mild congestion, [-] pneumothorax, [-] R/L effusion, [-] cardiomegaly,   NGT in place, S-G Catheter in place, R/L TLC in place, R/L Chest Tubes in place    ECG:  Reviewed and interpreted by me:   QTC:    Assessment:  CAD SP CABG    PAST MEDICAL & SURGICAL HISTORY:  DM (diabetes mellitus)      HTN (hypertension)      HLD (hyperlipidemia)      H/O knee surgery          PLAN:  Neuro: Pain control  Pulm: Encourage coughing, deep breathing and use of incentive spirometry. Wean off supplemental oxygen as able. Daily CXR.   Cardio: Monitor telemetry/alarms. Continue cardiac meds  GI: Tolerating diet. Continue stool softeners. Continue GI prophylaxis  Renal: monitor urine output, supplement electrolytes as needed  Vasc: Heparin SC/SCDs for DVT prophylaxis  Heme: Monitor H/H.   ID: Off antibiotics. Stable.  Endocrine: Monitor finger stick blood sugar and control hyperglycemia with insulin  Physical Therapy: OOB/ambulate        Discussed with Cardiothoracic Team at AM rounds.      45 minutes of critical care time spent providing medical care for patient's acute illness/conditions that impairs at least one vital organ system and/or poses a high risk of imminent or life threatening deterioration in the patient's condition. It includes time spent evaluating and treating the patient's acute illness as well as time spent reviewing labs, radiology, discussing goals of care with patient and/or patient's family, and discussing the case with a multidisciplinary team in an effort to prevent further life threatening deterioration or end organ damage. This time is independent of any procedures performed.   CTU Attending Progress Daily Note     12 Oct 2024 08:31    Procedure:                   CABG 4 LIMA                               POD#     1              Patient seen as post-op critical care follow-up    HPI:  84 years old male patient with pmh of HLD, DM, HTN brought to the ed for fall. Hx taken from the patient and is aox3 at time of the interview.  Patient states that he was going down to his basement, and when he got there the next thing he remembers is lying himself on the floor on his back. He does not know wether he lost consciousness or not not but thinks that he lost conciousnes as he does not know what actually happened and how he found himself on the floor. On further inquiry, patient states that he was trying to get up from the floor then he felt that his left shoulder is weak, painful and stiff. on trying to get up from the ground, he state that he hit his head against the ground .  He was finally able to gain his strength back and get up, he went up to his room and slept. His sons called his PCP who advised him to go to the ER. Further review of the systems is negative for any headache, light headiness dizziness palpitations, chest pain, nausea, vomit, diarrhea, dysuria , frequency, urgency, extremity weakness and other significant complaints.        (05 Oct 2024 02:37)    Interval event for past 24 hr:  ALEX GROVER  84y had no event.     Current Complains:  ALEX GROVER has no new complaints    REVIEW OF SYSTEMS:  CONSTITUTIONAL:  [-] weakness, [-] fevers, [-] chills  EYES/ENT: [-] visual changes, [-] vertigo, [-] throat pain   NECK: [-] pain, [-] stiffness  RESPIRATORY: [-] cough, [-] wheezing, [-] hemoptysis, [-] shortness of breath  CARDIOVASCULAR: [-] chest pain, [-] palpitations, [-] orthopnea  GASTROINTESTINAL:    [-]abdominal pain, [-] nausea, [-] vomiting, [-] hematemesis, [-] diarrhea, [-] constipation, [-] melena, [-] hematochezia.  GENITOURINARY: [-] dysuria, [-] frequency, [-] hematuria  NEUROLOGICAL: [-] numbness, [-] weakness  SKIN: [-] itching, [-] burning, [-] rashes, [-] lesions   All other review of systems is negative unless indicated above.    [  ] Unable to assess ROS because :    OBJECTIVE:  ICU Vital Signs Last 24 Hrs  T(C): 37.6 (12 Oct 2024 07:00), Max: 37.7 (11 Oct 2024 22:00)  T(F): 99.7 (12 Oct 2024 07:00), Max: 99.9 (11 Oct 2024 22:00)  HR: 76 (12 Oct 2024 07:00) (76 - 93)  BP: 128/64 (12 Oct 2024 06:00) (94/55 - 128/64)  BP(mean): 87 (12 Oct 2024 06:00) (69 - 90)  ABP: 110/53 (12 Oct 2024 07:00) (77/43 - 141/66)  ABP(mean): 68 (12 Oct 2024 07:00) (53 - 90)  RR: 19 (12 Oct 2024 07:00) (6 - 22)  SpO2: 98% (12 Oct 2024 07:00) (95% - 100%)    O2 Parameters below as of 12 Oct 2024 07:00  Patient On (Oxygen Delivery Method): nasal cannula  O2 Flow (L/min): 4          I&O's Summary    11 Oct 2024 07:01  -  12 Oct 2024 07:00  --------------------------------------------------------  IN: 1492.5 mL / OUT: 1055 mL / NET: 437.5 mL    12 Oct 2024 07:01  -  12 Oct 2024 08:31  --------------------------------------------------------  IN: 23 mL / OUT: 56 mL / NET: -33 mL      Adult Advanced Hemodynamics Last 24 Hrs  CVP(mm Hg): 9 (12 Oct 2024 07:00) (2 - 13)  CVP(cm H2O): --  CO: 5.9 (12 Oct 2024 06:00) (5 - 6.5)  CI: 2.8 (12 Oct 2024 06:00) (2.3 - 3.1)  PA: 34/15 (12 Oct 2024 07:00) (22/10 - 40/19)  PA(mean): 22 (12 Oct 2024 07:00) (16 - 27)  PCWP: --  SVR: 799 (12 Oct 2024 06:00) (700 - 1316)  SVRI: 1683 (12 Oct 2024 06:00) (6899 - 5406)  PVR: --  PVRI: --  Mode: CPAP with PS  FiO2: 50  PEEP: 5  PS: 7      PHYSICAL EXAM:  General: WN/WD NAD  HEENT:     [+] NCAT  [+] EOMI  [-] Conjuctival edema   [-] Icterus   [-] Thrush   [-] ETT  [-] NGT/OGT  Neck:         [+] FROM   [+] JVD     [-] Nodes     [-] Masses    [+] Mid-line trachea    [-] Tracheostomy  Chest:         [-] Sternal click   [-] Sternal drainage   [+] Pacing wires   [+] Chest tubes   [-] SubQ emphysema  Lungs:          [+] CTA   [-] Rhonchi   [-] Rales    [-] Wheezing    [-] Decreased BS    [-] Dullness R L  Cardiac:       [+] S1 [+] S2    [+] RRR   [-] Irregular   [-] S3   [-] S4    [-] Murmurs    [-] Rub  Abdomen:    [+] BS    [+] Soft    [+] Non-tender     [-] Distended    [-] Organomegaly  [-] PEG  Extremities:   [-] Cyanosis U/L   [-] Clubbing  U/L  [-] LE/UE Edema   [+] Capillary refill    [+] Pulses   Neuro:        [+] Awake   [+]  Alert   [-] Confused   [-] Lethargic   [-] Sedated   [-] Generalized Weakness  Skin:        [-] Rashes    [-] Erythema   [+] Normal incisions   [+] IV sites intact   [-] Sacral decubitus      CAPILLARY BLOOD GLUCOSE      POCT Blood Glucose.: 154 mg/dL (12 Oct 2024 08:08)    CAPILLARY BLOOD GLUCOSE      POCT Blood Glucose.: 154 mg/dL (12 Oct 2024 08:08)  POCT Blood Glucose.: 137 mg/dL (12 Oct 2024 06:05)  POCT Blood Glucose.: 127 mg/dL (12 Oct 2024 02:12)  POCT Blood Glucose.: 115 mg/dL (12 Oct 2024 00:11)  POCT Blood Glucose.: 110 mg/dL (11 Oct 2024 22:09)  POCT Blood Glucose.: 103 mg/dL (11 Oct 2024 21:03)  POCT Blood Glucose.: 118 mg/dL (11 Oct 2024 19:48)  POCT Blood Glucose.: 125 mg/dL (11 Oct 2024 19:00)  POCT Blood Glucose.: 144 mg/dL (11 Oct 2024 18:12)  POCT Blood Glucose.: 157 mg/dL (11 Oct 2024 17:04)  POCT Blood Glucose.: 167 mg/dL (11 Oct 2024 16:05)  POCT Blood Glucose.: 158 mg/dL (11 Oct 2024 14:51)      HOSPITAL MEDICATIONS:  MEDICATIONS  (STANDING):  albumin human  5% IVPB 3000 milliLiter(s) IV Intermittent once  albuterol    90 MICROgram(s) HFA Inhaler 2 Puff(s) Inhalation every 6 hours  aspirin enteric coated 325 milliGRAM(s) Oral daily  aspirin Suppository 300 milliGRAM(s) Rectal once  bisacodyl 5 milliGRAM(s) Oral every 12 hours  ceFAZolin   IVPB 2000 milliGRAM(s) IV Intermittent every 8 hours  chlorhexidine 0.12% Liquid 15 milliLiter(s) Oral Mucosa every 12 hours  chlorhexidine 0.12% Liquid 15 milliLiter(s) Oral Mucosa every 12 hours  chlorhexidine 2% Cloths 1 Application(s) Topical daily  dexMEDEtomidine Infusion 0.25 MICROgram(s)/kG/Hr (5.83 mL/Hr) IV Continuous <Continuous>  dextrose 50% Injectable 50 milliLiter(s) IV Push every 15 minutes  famotidine Injectable 20 milliGRAM(s) IV Push every 12 hours  insulin regular Infusion 1 Unit(s)/Hr (1 mL/Hr) IV Continuous <Continuous>  ipratropium 17 MICROgram(s) HFA Inhaler 2 Puff(s) Inhalation every 6 hours  magnesium sulfate  IVPB 1 Gram(s) IV Intermittent every 12 hours  meperidine     Injectable 25 milliGRAM(s) IV Push once  niCARdipine Infusion 5 mG/Hr (25 mL/Hr) IV Continuous <Continuous>  nitroglycerin  Infusion 5 MICROgram(s)/Min (1.5 mL/Hr) IV Continuous <Continuous>  norepinephrine Infusion 0.05 MICROgram(s)/kG/Min (8.74 mL/Hr) IV Continuous <Continuous>  ondansetron Injectable 4 milliGRAM(s) IV Push once  polyethylene glycol 3350 17 Gram(s) Oral daily  propofol Infusion 15.004 MICROgram(s)/kG/Min (8.39 mL/Hr) IV Continuous <Continuous>  senna 2 Tablet(s) Oral at bedtime  sodium chloride 0.9%. 1000 milliLiter(s) (20 mL/Hr) IV Continuous <Continuous>  sodium chloride 0.9%. 1000 milliLiter(s) (150 mL/Hr) IV Continuous <Continuous>  vancomycin  IVPB 1000 milliGRAM(s) IV Intermittent every 12 hours    MEDICATIONS  (PRN):  acetaminophen     Tablet .. 650 milliGRAM(s) Oral every 6 hours PRN Temp greater or equal to 38C (100.4F), Mild Pain (1 - 3)  acetaminophen   IVPB .. 1000 milliGRAM(s) IV Intermittent once PRN Moderate Pain (4 - 6)  fentaNYL    Injectable 25 MICROGram(s) IV Push every 4 hours PRN Severe Pain (7 - 10)  ondansetron Injectable 4 milliGRAM(s) IV Push every 6 hours PRN Nausea and/or Vomiting  oxyCODONE    IR 10 milliGRAM(s) Oral every 4 hours PRN Severe Pain (7 - 10)  oxyCODONE    IR 5 milliGRAM(s) Oral every 4 hours PRN Moderate Pain (4 - 6)      LABS:  ABG - ( 12 Oct 2024 04:10 )  pH, Arterial: 7.33  pH, Blood: x     /  pCO2: 33    /  pO2: 76    / HCO3: 17    / Base Excess: -7.6  /  SaO2: 98.6                              10.8   18.78 )-----------( 212      ( 12 Oct 2024 02:00 )             33.1     10-12    136  |  104  |  22[H]  ----------------------------<  114[H]  4.7   |  18  |  1.2    Ca    8.0[L]      12 Oct 2024 02:00  Mg     1.7     10-12    TPro  4.8[L]  /  Alb  3.2[L]  /  TBili  0.8  /  DBili  x   /  AST  163[H]  /  ALT  163[H]  /  AlkPhos  86  10-12    PT/INR - ( 11 Oct 2024 15:04 )   PT: 14.70 sec;   INR: 1.29 ratio         PTT - ( 11 Oct 2024 15:04 )  PTT:26.9 sec  Urinalysis Basic - ( 12 Oct 2024 02:00 )    Color: x / Appearance: x / SG: x / pH: x  Gluc: 114 mg/dL / Ketone: x  / Bili: x / Urobili: x   Blood: x / Protein: x / Nitrite: x   Leuk Esterase: x / RBC: x / WBC x   Sq Epi: x / Non Sq Epi: x / Bacteria: x          RADIOLOGY:  Reviewed and interpreted by me  CXR from 10-12-24 shows [+] mild congestion, [-] pneumothorax, [-] R/L effusion, [-] cardiomegaly,   S-G Catheter in place, R/L TLC in place, R/L Chest Tubes in place    ECG:  Reviewed and interpreted by me: SR [id number]  QTC: 488    Assessment:  CAD SP CABG  Leukocytosis reactive to surgery  elevated LFTS     PAST MEDICAL & SURGICAL HISTORY:  DM (diabetes mellitus)      HTN (hypertension)      HLD (hyperlipidemia)      H/O knee surgery          PLAN:  Neuro: Pain control  Pulm: Encourage coughing, deep breathing and use of incentive spirometry. Wean off supplemental oxygen as able. Daily CXR.   Cardio: Monitor telemetry/alarms. ROME Morales 12.5  GI: Tolerating diet. Continue stool softeners. Continue GI prophylaxis  Renal: monitor urine output, supplement electrolytes as needed  Vasc: Heparin SC/SCDs for DVT prophylaxis  Heme: Monitor H/H.   ID: post-op prophylactic antibiotics cefazolin/ vanco  - trend WBCs  Endocrine: Monitor finger stick blood sugar and control hyperglycemia with insulin  Physical Therapy: OOB/ambulate        Discussed with Cardiothoracic Team at AM rounds.

## 2024-10-12 NOTE — PROGRESS NOTE ADULT - SUBJECTIVE AND OBJECTIVE BOX
OPERATIVE PROCEDURE(s):                POD #   1                    84yMale  SURGEON(s): STACY Butler  SUBJECTIVE ASSESSMENT:   Vital Signs Last 24 Hrs  T(F): 99.7 (12 Oct 2024 07:00), Max: 99.9 (11 Oct 2024 22:00)  HR: 76 (12 Oct 2024 07:00) (76 - 93)  BP: 128/64 (12 Oct 2024 06:00) (94/55 - 128/64)  BP(mean): 87 (12 Oct 2024 06:00) (69 - 90)  ABP: 110/53 (12 Oct 2024 07:00) (77/43 - 141/66)  ABP(mean): 68 (12 Oct 2024 07:00)  RR: 19 (12 Oct 2024 07:00) (6 - 22)  SpO2: 98% (12 Oct 2024 07:00) (95% - 100%) 4L  CVP(mm Hg): 9 (12 Oct 2024 07:00)  CO: 5.9 (12 Oct 2024 06:00)  CI: 2.8 (12 Oct 2024 06:00)  PA: 34/15 (12 Oct 2024 07:00)  SVR: 799 (12 Oct 2024 06:00)    I&O's Detail    11 Oct 2024 07:01  -  12 Oct 2024 07:00  --------------------------------------------------------  IN:    Albumin 5%  - 500 mL: 500 mL    Insulin: 27 mL    IV PiggyBack: 100 mL    IV PiggyBack: 350 mL    IV PiggyBack: 100 mL    Nitroglycerin: 6 mL    Norepinephrine: 9.5 mL    Oral Fluid: 60 mL    sodium chloride 0.9%: 340 mL  Total IN: 1492.5 mL    OUT:    Chest Tube (mL): 175 mL    Chest Tube (mL): 180 mL    Chest Tube (mL): 70 mL    Dexmedetomidine: 0 mL    Indwelling Catheter - Urethral (mL): 630 mL    NiCARdipine: 0 mL    Propofol: 0 mL  Total OUT: 1055 mL        Net:   I&O's Detail    10 Oct 2024 07:01  -  11 Oct 2024 07:00  --------------------------------------------------------  Total NET: -752 mL      11 Oct 2024 07:01  -  12 Oct 2024 07:00  --------------------------------------------------------  Total NET: 437.5 mL        CAPILLARY BLOOD GLUCOSE      POCT Blood Glucose.: 154 mg/dL (12 Oct 2024 08:08)  POCT Blood Glucose.: 137 mg/dL (12 Oct 2024 06:05)  POCT Blood Glucose.: 127 mg/dL (12 Oct 2024 02:12)  POCT Blood Glucose.: 115 mg/dL (12 Oct 2024 00:11)  POCT Blood Glucose.: 110 mg/dL (11 Oct 2024 22:09)  POCT Blood Glucose.: 103 mg/dL (11 Oct 2024 21:03)  POCT Blood Glucose.: 118 mg/dL (11 Oct 2024 19:48)  POCT Blood Glucose.: 125 mg/dL (11 Oct 2024 19:00)  POCT Blood Glucose.: 144 mg/dL (11 Oct 2024 18:12)  POCT Blood Glucose.: 157 mg/dL (11 Oct 2024 17:04)  POCT Blood Glucose.: 167 mg/dL (11 Oct 2024 16:05)  POCT Blood Glucose.: 158 mg/dL (11 Oct 2024 14:51)    Physical Exam:  General: NAD; A&Ox3/Patient is intubated and sedated  Cardiac: S1/S2, RRR, no murmur, no rubs  Lungs: unlabored respirations, CTA b/l, no wheeze, no rales, no crackles  Abdomen: Soft/NT/ND; positive bowel sounds x 4  Sternum: Intact, no click, incision healing well with no drainage  Incisions: Incisions clean/dry/intact  Extremities: No edema b/l lower extremities; good capillary refill; no cyanosis; palpable 1+ pedal pulses b/l    Central Venous Catheter: Yes[]  No[] , If Yes indication:           Day #  Jernigan Catheter: Yes  [] , No  [] , If yes indication:                      Day #  NGT: Yes [] No [] ,    If Yes Placement:                                     Day #  EPICARDIAL WIRES:  [] YES [] NO                                              Day #  BOWEL MOVEMENT:  [] YES [] NO, If No, Timing since last BM:      Day #  CHEST TUBE(Left/Right):  [] YES [] NO, If yes -  AIR LEAKS:  [] YES [] NO        LABS:                        10.8[L]  18.78[H] )-----------( 212      ( 12 Oct 2024 02:00 )             33.1[L]                        11.8[L]  19.12[H] )-----------( 181      ( 11 Oct 2024 15:04 )             35.1[L]    10-12    136  |  104  |  22[H]  ----------------------------<  114[H]  4.7   |  18  |  1.2  10-11    141  |  107  |  18  ----------------------------<  163[H]  4.1   |  23  |  1.0    Ca    8.0[L]      12 Oct 2024 02:00  Mg     1.7     10-12    TPro  4.8[L] [6.0 - 8.0]  /  Alb  3.2[L] [3.5 - 5.2]  /  TBili  0.8 [0.2 - 1.2]  /  DBili  x   /  AST  163[H] [0 - 41]  /  ALT  163[H] [0 - 41]  /  AlkPhos  86 [30 - 115]  10-12    PT/INR - ( 11 Oct 2024 15:04 )   PT: ;   INR: 1.29 ratio         PTT - ( 11 Oct 2024 15:04 )  PTT:26.9 sec  Urinalysis Basic - ( 12 Oct 2024 02:00 )    Color: x / Appearance: x / SG: x / pH: x  Gluc: 114 mg/dL / Ketone: x  / Bili: x / Urobili: x   Blood: x / Protein: x / Nitrite: x   Leuk Esterase: x / RBC: x / WBC x   Sq Epi: x / Non Sq Epi: x / Bacteria: x      ABG - ( 12 Oct 2024 04:10 )  pH: 7.33  /  pCO2: 33    /  pO2: 76    / HCO3: 17    / Base Excess: -7.6  /  SaO2: 98.6  /  LA: 0.7              RADIOLOGY & ADDITIONAL TESTS:  CXR:  EKG:  MEDICATIONS  (STANDING):  albumin human  5% IVPB 3000 milliLiter(s) IV Intermittent once  albuterol    90 MICROgram(s) HFA Inhaler 2 Puff(s) Inhalation every 6 hours  aspirin enteric coated 325 milliGRAM(s) Oral daily  aspirin Suppository 300 milliGRAM(s) Rectal once  bisacodyl 5 milliGRAM(s) Oral every 12 hours  ceFAZolin   IVPB 2000 milliGRAM(s) IV Intermittent every 8 hours  chlorhexidine 0.12% Liquid 15 milliLiter(s) Oral Mucosa every 12 hours  chlorhexidine 0.12% Liquid 15 milliLiter(s) Oral Mucosa every 12 hours  chlorhexidine 2% Cloths 1 Application(s) Topical daily  dexMEDEtomidine Infusion 0.25 MICROgram(s)/kG/Hr (5.83 mL/Hr) IV Continuous <Continuous>  dextrose 50% Injectable 50 milliLiter(s) IV Push every 15 minutes  famotidine Injectable 20 milliGRAM(s) IV Push every 12 hours  insulin regular Infusion 1 Unit(s)/Hr (1 mL/Hr) IV Continuous <Continuous>  ipratropium 17 MICROgram(s) HFA Inhaler 2 Puff(s) Inhalation every 6 hours  magnesium sulfate  IVPB 1 Gram(s) IV Intermittent every 12 hours  meperidine     Injectable 25 milliGRAM(s) IV Push once  niCARdipine Infusion 5 mG/Hr (25 mL/Hr) IV Continuous <Continuous>  nitroglycerin  Infusion 5 MICROgram(s)/Min (1.5 mL/Hr) IV Continuous <Continuous>  norepinephrine Infusion 0.05 MICROgram(s)/kG/Min (8.74 mL/Hr) IV Continuous <Continuous>  ondansetron Injectable 4 milliGRAM(s) IV Push once  polyethylene glycol 3350 17 Gram(s) Oral daily  propofol Infusion 15.004 MICROgram(s)/kG/Min (8.39 mL/Hr) IV Continuous <Continuous>  senna 2 Tablet(s) Oral at bedtime  sodium chloride 0.9%. 1000 milliLiter(s) (150 mL/Hr) IV Continuous <Continuous>  sodium chloride 0.9%. 1000 milliLiter(s) (20 mL/Hr) IV Continuous <Continuous>  vancomycin  IVPB 1000 milliGRAM(s) IV Intermittent every 12 hours    MEDICATIONS  (PRN):  acetaminophen     Tablet .. 650 milliGRAM(s) Oral every 6 hours PRN Temp greater or equal to 38C (100.4F), Mild Pain (1 - 3)  acetaminophen   IVPB .. 1000 milliGRAM(s) IV Intermittent once PRN Moderate Pain (4 - 6)  fentaNYL    Injectable 25 MICROGram(s) IV Push every 4 hours PRN Severe Pain (7 - 10)  ondansetron Injectable 4 milliGRAM(s) IV Push every 6 hours PRN Nausea and/or Vomiting  oxyCODONE    IR 5 milliGRAM(s) Oral every 4 hours PRN Moderate Pain (4 - 6)  oxyCODONE    IR 10 milliGRAM(s) Oral every 4 hours PRN Severe Pain (7 - 10)    HEPARIN:  [] YES [] NO  Dose: XX UNITS/HR UNITS Q8H  LOVENOX:[] YES [] NO  Dose: XX mg Q24H  COUMADIN: []  YES [] NO  Dose: XX mg  Q24H  SCD's: YES b/l  GI Prophylaxis: Protonix [], Pepcid [], None [], (Contra-indication:.....)    Post-Op Beta-Blockers: Yes [], No[], If No, then contraindication:  Post-Op Aspirin: Yes [],  No [], If No, then contraindication:  Post-Op Statin: Yes [], No[], If No, then contraindication:  Allergies    No Known Allergies    Intolerances      Ambulation/Activity Status:    Assessment/Plan:  84y Male status-post .....  - Case and plan discussed with CTU Intensivist and CT Surgeon - Dr. Portillo/Pedro Pablo/Bobbi  - Continue CTU supportive care    - Continue DVT/GI prophylaxis  - Incentive Spirometry 10 times an hour  - Continue to advance physical activity as tolerated and continue PT/OT as directed  1. CAD: Continue ASA, statin, BB  2. HTN:   3. A. Fib:   4. COPD/Hypoxia:   5. DM/Glucose Control:     Social Service Disposition:     OPERATIVE PROCEDURE(s):       CABGx4         POD #   1                    84yMale  SURGEON(s): Pedro Pablo  SUBJECTIVE ASSESSMENT: pt seen and examined. no acute complaints at this time.   Vital Signs Last 24 Hrs  T(F): 99.7 (12 Oct 2024 07:00), Max: 99.9 (11 Oct 2024 22:00)  HR: 76 (12 Oct 2024 07:00) (76 - 93)  BP: 128/64 (12 Oct 2024 06:00) (94/55 - 128/64)  BP(mean): 87 (12 Oct 2024 06:00) (69 - 90)  ABP: 110/53 (12 Oct 2024 07:00) (77/43 - 141/66)  ABP(mean): 68 (12 Oct 2024 07:00)  RR: 19 (12 Oct 2024 07:00) (6 - 22)  SpO2: 98% (12 Oct 2024 07:00) (95% - 100%) 4L  CVP(mm Hg): 9 (12 Oct 2024 07:00)  CO: 5.9 (12 Oct 2024 06:00)  CI: 2.8 (12 Oct 2024 06:00)  PA: 34/15 (12 Oct 2024 07:00)  SVR: 799 (12 Oct 2024 06:00)    I&O's Detail    11 Oct 2024 07:01  -  12 Oct 2024 07:00  --------------------------------------------------------  IN:    Albumin 5%  - 500 mL: 500 mL    Insulin: 27 mL    IV PiggyBack: 100 mL    IV PiggyBack: 350 mL    IV PiggyBack: 100 mL    Nitroglycerin: 6 mL    Norepinephrine: 9.5 mL    Oral Fluid: 60 mL    sodium chloride 0.9%: 340 mL  Total IN: 1492.5 mL    OUT:    Chest Tube (mL): 175 mL    Chest Tube (mL): 180 mL    Chest Tube (mL): 70 mL    Dexmedetomidine: 0 mL    Indwelling Catheter - Urethral (mL): 630 mL    NiCARdipine: 0 mL    Propofol: 0 mL  Total OUT: 1055 mL        Net:   I&O's Detail    10 Oct 2024 07:01  -  11 Oct 2024 07:00  --------------------------------------------------------  Total NET: -752 mL      11 Oct 2024 07:01  -  12 Oct 2024 07:00  --------------------------------------------------------  Total NET: 437.5 mL        CAPILLARY BLOOD GLUCOSE      POCT Blood Glucose.: 154 mg/dL (12 Oct 2024 08:08)  POCT Blood Glucose.: 137 mg/dL (12 Oct 2024 06:05)  POCT Blood Glucose.: 127 mg/dL (12 Oct 2024 02:12)  POCT Blood Glucose.: 115 mg/dL (12 Oct 2024 00:11)  POCT Blood Glucose.: 110 mg/dL (11 Oct 2024 22:09)  POCT Blood Glucose.: 103 mg/dL (11 Oct 2024 21:03)  POCT Blood Glucose.: 118 mg/dL (11 Oct 2024 19:48)  POCT Blood Glucose.: 125 mg/dL (11 Oct 2024 19:00)  POCT Blood Glucose.: 144 mg/dL (11 Oct 2024 18:12)  POCT Blood Glucose.: 157 mg/dL (11 Oct 2024 17:04)  POCT Blood Glucose.: 167 mg/dL (11 Oct 2024 16:05)  POCT Blood Glucose.: 158 mg/dL (11 Oct 2024 14:51)    Physical Exam:  General: NAD; A&Ox3, no focal deficits clear speech  Cardiac: S1/S2, RRR, no murmur, no rubs  Lungs: unlabored respirations, CTA b/l, no wheeze, no rales, no crackles  Abdomen: Soft/NT/ND; positive bowel sounds x 4  Sternum: Intact, no click, incision healing well with no drainage  Incisions: Incisions clean/dry/intact  Extremities: mild edema b/l lower extremities; good capillary refill; no cyanosis; palpable 1+ pedal pulses b/l    Central Venous Catheter: Yes[x]  No[] , If Yes indication:    critical       Day #1  Jernigan Catheter: Yes  [x] , No  [] , If yes indication:      strict i.o                Day #1  EPICARDIAL WIRES:  [x] YES [] NO                                              Day #1  BOWEL MOVEMENT:  [] YES [x] NO, If No, Timing since last BM:      Day #  CHEST TUBE(Left/Right):  [x] YES [] NO, If yes -  AIR LEAKS:  [] YES [x] NO        LABS:                        10.8[L]  18.78[H] )-----------( 212      ( 12 Oct 2024 02:00 )             33.1[L]                        11.8[L]  19.12[H] )-----------( 181      ( 11 Oct 2024 15:04 )             35.1[L]    10-12    136  |  104  |  22[H]  ----------------------------<  114[H]  4.7   |  18  |  1.2  10-11    141  |  107  |  18  ----------------------------<  163[H]  4.1   |  23  |  1.0    Ca    8.0[L]      12 Oct 2024 02:00  Mg     1.7     10-12    TPro  4.8[L] [6.0 - 8.0]  /  Alb  3.2[L] [3.5 - 5.2]  /  TBili  0.8 [0.2 - 1.2]  /  DBili  x   /  AST  163[H] [0 - 41]  /  ALT  163[H] [0 - 41]  /  AlkPhos  86 [30 - 115]  10-12    PT/INR - ( 11 Oct 2024 15:04 )   PT: ;   INR: 1.29 ratio         PTT - ( 11 Oct 2024 15:04 )  PTT:26.9 sec  Urinalysis Basic - ( 12 Oct 2024 02:00 )    Color: x / Appearance: x / SG: x / pH: x  Gluc: 114 mg/dL / Ketone: x  / Bili: x / Urobili: x   Blood: x / Protein: x / Nitrite: x   Leuk Esterase: x / RBC: x / WBC x   Sq Epi: x / Non Sq Epi: x / Bacteria: x      ABG - ( 12 Oct 2024 04:10 )  pH: 7.33  /  pCO2: 33    /  pO2: 76    / HCO3: 17    / Base Excess: -7.6  /  SaO2: 98.6  /  LA: 0.7              RADIOLOGY & ADDITIONAL TESTS:  CXR: < from: Xray Chest 1 View- PORTABLE-Urgent (Xray Chest 1 View- PORTABLE-Urgent .) (10.12.24 @ 11:21) >    IMPRESSION:    Interim removal of bilateral chest tubes, no pneumothoraces. Right   basilar opacity, new. Redemonstration left basilar opacity/pleural  effusion and cardiomegaly    < end of copied text >    EKG: < from: 12 Lead ECG (10.04.24 @ 20:21) >    Ventricular Rate 56 BPM    Atrial Rate 56 BPM    P-R Interval 168 ms    QRS Duration 86 ms    Q-T Interval 442 ms    QTC Calculation(Bazett) 426 ms    P Axis 48 degrees    R Axis 95 degrees    T Axis -22 degrees    Diagnosis Line Sinus bradycardia  Rightward axis  T wave abnormality, consider inferolateral ischemia  Abnormal ECG    < end of copied text >    MEDICATIONS  (STANDING):  albumin human  5% IVPB 3000 milliLiter(s) IV Intermittent once  albuterol    90 MICROgram(s) HFA Inhaler 2 Puff(s) Inhalation every 6 hours  aspirin enteric coated 325 milliGRAM(s) Oral daily  aspirin Suppository 300 milliGRAM(s) Rectal once  bisacodyl 5 milliGRAM(s) Oral every 12 hours  ceFAZolin   IVPB 2000 milliGRAM(s) IV Intermittent every 8 hours  chlorhexidine 0.12% Liquid 15 milliLiter(s) Oral Mucosa every 12 hours  chlorhexidine 0.12% Liquid 15 milliLiter(s) Oral Mucosa every 12 hours  chlorhexidine 2% Cloths 1 Application(s) Topical daily  dexMEDEtomidine Infusion 0.25 MICROgram(s)/kG/Hr (5.83 mL/Hr) IV Continuous <Continuous>  dextrose 50% Injectable 50 milliLiter(s) IV Push every 15 minutes  famotidine Injectable 20 milliGRAM(s) IV Push every 12 hours  insulin regular Infusion 1 Unit(s)/Hr (1 mL/Hr) IV Continuous <Continuous>  ipratropium 17 MICROgram(s) HFA Inhaler 2 Puff(s) Inhalation every 6 hours  magnesium sulfate  IVPB 1 Gram(s) IV Intermittent every 12 hours  meperidine     Injectable 25 milliGRAM(s) IV Push once  niCARdipine Infusion 5 mG/Hr (25 mL/Hr) IV Continuous <Continuous>  nitroglycerin  Infusion 5 MICROgram(s)/Min (1.5 mL/Hr) IV Continuous <Continuous>  norepinephrine Infusion 0.05 MICROgram(s)/kG/Min (8.74 mL/Hr) IV Continuous <Continuous>  ondansetron Injectable 4 milliGRAM(s) IV Push once  polyethylene glycol 3350 17 Gram(s) Oral daily  propofol Infusion 15.004 MICROgram(s)/kG/Min (8.39 mL/Hr) IV Continuous <Continuous>  senna 2 Tablet(s) Oral at bedtime  sodium chloride 0.9%. 1000 milliLiter(s) (150 mL/Hr) IV Continuous <Continuous>  sodium chloride 0.9%. 1000 milliLiter(s) (20 mL/Hr) IV Continuous <Continuous>  vancomycin  IVPB 1000 milliGRAM(s) IV Intermittent every 12 hours    MEDICATIONS  (PRN):  acetaminophen     Tablet .. 650 milliGRAM(s) Oral every 6 hours PRN Temp greater or equal to 38C (100.4F), Mild Pain (1 - 3)  acetaminophen   IVPB .. 1000 milliGRAM(s) IV Intermittent once PRN Moderate Pain (4 - 6)  fentaNYL    Injectable 25 MICROGram(s) IV Push every 4 hours PRN Severe Pain (7 - 10)  ondansetron Injectable 4 milliGRAM(s) IV Push every 6 hours PRN Nausea and/or Vomiting  oxyCODONE    IR 5 milliGRAM(s) Oral every 4 hours PRN Moderate Pain (4 - 6)  oxyCODONE    IR 10 milliGRAM(s) Oral every 4 hours PRN Severe Pain (7 - 10)    HEPARIN:  [x] YES [] NO  Dose: 5000 UNITS Q12H  SCD's: YES b/l  GI Prophylaxis: Protonix [x], Pepcid [], None [], (Contra-indication:.....)    Post-Op Beta-Blockers: Yes [x], No[], If No, then contraindication:  Post-Op Aspirin: Yes [x],  No [], If No, then contraindication:  Post-Op Statin: Yes [x], No[], If No, then contraindication:  Allergies    No Known Allergies    Intolerances      Ambulation/Activity Status: ambulate     Assessment/Plan:  84y Male status-post CABGx4 POD#1  - Case and plan discussed with CTU Intensivist and CT Surgeon - Dr. Portillo/Pedro Pablo/Bobbi  - Continue CTU supportive care    - Continue DVT/GI prophylaxis  - Incentive Spirometry 10 times an hour  - Continue to advance physical activity as tolerated and continue PT/OT as directed  1. CAD: Continue ASA, statin, BB, pain control prn, d/c sump, cts, and swan  2. HTN: start low dose metoprolol, cont to monitor  3. A. Fib prophylaxis: bb, cont to monitor electrolytes   4. Post-op Hypoxia: cont nebs, wean o2 as tolerated, encourage incentive spirometry   5. DM/Glucose Control: start lantus 10 with sliding scale     Social Service Disposition:  pt to assess

## 2024-10-13 LAB
ALBUMIN SERPL ELPH-MCNC: 3.4 G/DL — LOW (ref 3.5–5.2)
ALP SERPL-CCNC: 82 U/L — SIGNIFICANT CHANGE UP (ref 30–115)
ALT FLD-CCNC: 65 U/L — HIGH (ref 0–41)
ANION GAP SERPL CALC-SCNC: 11 MMOL/L — SIGNIFICANT CHANGE UP (ref 7–14)
AST SERPL-CCNC: 42 U/L — HIGH (ref 0–41)
BASOPHILS # BLD AUTO: 0.03 K/UL — SIGNIFICANT CHANGE UP (ref 0–0.2)
BASOPHILS NFR BLD AUTO: 0.2 % — SIGNIFICANT CHANGE UP (ref 0–1)
BILIRUB SERPL-MCNC: 0.6 MG/DL — SIGNIFICANT CHANGE UP (ref 0.2–1.2)
BUN SERPL-MCNC: 32 MG/DL — HIGH (ref 10–20)
CALCIUM SERPL-MCNC: 8.5 MG/DL — SIGNIFICANT CHANGE UP (ref 8.4–10.5)
CHLORIDE SERPL-SCNC: 107 MMOL/L — SIGNIFICANT CHANGE UP (ref 98–110)
CO2 SERPL-SCNC: 23 MMOL/L — SIGNIFICANT CHANGE UP (ref 17–32)
CREAT SERPL-MCNC: 1.2 MG/DL — SIGNIFICANT CHANGE UP (ref 0.7–1.5)
EGFR: 60 ML/MIN/1.73M2 — SIGNIFICANT CHANGE UP
EOSINOPHIL # BLD AUTO: 0 K/UL — SIGNIFICANT CHANGE UP (ref 0–0.7)
EOSINOPHIL NFR BLD AUTO: 0 % — SIGNIFICANT CHANGE UP (ref 0–8)
GLUCOSE BLDC GLUCOMTR-MCNC: 108 MG/DL — HIGH (ref 70–99)
GLUCOSE BLDC GLUCOMTR-MCNC: 120 MG/DL — HIGH (ref 70–99)
GLUCOSE BLDC GLUCOMTR-MCNC: 122 MG/DL — HIGH (ref 70–99)
GLUCOSE BLDC GLUCOMTR-MCNC: 127 MG/DL — HIGH (ref 70–99)
GLUCOSE BLDC GLUCOMTR-MCNC: 151 MG/DL — HIGH (ref 70–99)
GLUCOSE BLDC GLUCOMTR-MCNC: 179 MG/DL — HIGH (ref 70–99)
GLUCOSE BLDC GLUCOMTR-MCNC: 200 MG/DL — HIGH (ref 70–99)
GLUCOSE BLDC GLUCOMTR-MCNC: 201 MG/DL — HIGH (ref 70–99)
GLUCOSE SERPL-MCNC: 108 MG/DL — HIGH (ref 70–99)
HCT VFR BLD CALC: 30.2 % — LOW (ref 42–52)
HGB BLD-MCNC: 9.9 G/DL — LOW (ref 14–18)
IMM GRANULOCYTES NFR BLD AUTO: 0.9 % — HIGH (ref 0.1–0.3)
LYMPHOCYTES # BLD AUTO: 0.9 K/UL — LOW (ref 1.2–3.4)
LYMPHOCYTES # BLD AUTO: 4.7 % — LOW (ref 20.5–51.1)
MAGNESIUM SERPL-MCNC: 1.9 MG/DL — SIGNIFICANT CHANGE UP (ref 1.8–2.4)
MCHC RBC-ENTMCNC: 31.2 PG — HIGH (ref 27–31)
MCHC RBC-ENTMCNC: 32.8 G/DL — SIGNIFICANT CHANGE UP (ref 32–37)
MCV RBC AUTO: 95.3 FL — HIGH (ref 80–94)
MONOCYTES # BLD AUTO: 1.32 K/UL — HIGH (ref 0.1–0.6)
MONOCYTES NFR BLD AUTO: 6.9 % — SIGNIFICANT CHANGE UP (ref 1.7–9.3)
NEUTROPHILS # BLD AUTO: 16.82 K/UL — HIGH (ref 1.4–6.5)
NEUTROPHILS NFR BLD AUTO: 87.3 % — HIGH (ref 42.2–75.2)
NRBC # BLD: 0 /100 WBCS — SIGNIFICANT CHANGE UP (ref 0–0)
PLATELET # BLD AUTO: 192 K/UL — SIGNIFICANT CHANGE UP (ref 130–400)
PMV BLD: 10.3 FL — SIGNIFICANT CHANGE UP (ref 7.4–10.4)
POTASSIUM SERPL-MCNC: 4.3 MMOL/L — SIGNIFICANT CHANGE UP (ref 3.5–5)
POTASSIUM SERPL-SCNC: 4.3 MMOL/L — SIGNIFICANT CHANGE UP (ref 3.5–5)
PROT SERPL-MCNC: 5.1 G/DL — LOW (ref 6–8)
RBC # BLD: 3.17 M/UL — LOW (ref 4.7–6.1)
RBC # FLD: 13.7 % — SIGNIFICANT CHANGE UP (ref 11.5–14.5)
SODIUM SERPL-SCNC: 141 MMOL/L — SIGNIFICANT CHANGE UP (ref 135–146)
WBC # BLD: 19.25 K/UL — HIGH (ref 4.8–10.8)
WBC # FLD AUTO: 19.25 K/UL — HIGH (ref 4.8–10.8)

## 2024-10-13 PROCEDURE — 71045 X-RAY EXAM CHEST 1 VIEW: CPT | Mod: 26

## 2024-10-13 PROCEDURE — 99291 CRITICAL CARE FIRST HOUR: CPT

## 2024-10-13 PROCEDURE — 93010 ELECTROCARDIOGRAM REPORT: CPT

## 2024-10-13 RX ORDER — ALCOHOL ANTISEPTIC PADS
15 PADS, MEDICATED (EA) TOPICAL ONCE
Refills: 0 | Status: DISCONTINUED | OUTPATIENT
Start: 2024-10-13 | End: 2024-10-16

## 2024-10-13 RX ORDER — INSULIN LISPRO 100/ML
VIAL (ML) SUBCUTANEOUS AT BEDTIME
Refills: 0 | Status: DISCONTINUED | OUTPATIENT
Start: 2024-10-13 | End: 2024-10-16

## 2024-10-13 RX ORDER — INSULIN LISPRO 100/ML
3 VIAL (ML) SUBCUTANEOUS
Refills: 0 | Status: DISCONTINUED | OUTPATIENT
Start: 2024-10-13 | End: 2024-10-14

## 2024-10-13 RX ORDER — 5-HYDROXYTRYPTOPHAN (5-HTP) 100 MG
5 TABLET,DISINTEGRATING ORAL AT BEDTIME
Refills: 0 | Status: DISCONTINUED | OUTPATIENT
Start: 2024-10-13 | End: 2024-10-16

## 2024-10-13 RX ORDER — SODIUM CHLORIDE IRRIG SOLUTION 0.9 %
1000 SOLUTION, IRRIGATION IRRIGATION
Refills: 0 | Status: DISCONTINUED | OUTPATIENT
Start: 2024-10-13 | End: 2024-10-16

## 2024-10-13 RX ORDER — INSULIN GLARGINE 300 U/ML
25 INJECTION, SOLUTION SUBCUTANEOUS EVERY MORNING
Refills: 0 | Status: DISCONTINUED | OUTPATIENT
Start: 2024-10-13 | End: 2024-10-16

## 2024-10-13 RX ORDER — INSULIN LISPRO 100/ML
VIAL (ML) SUBCUTANEOUS
Refills: 0 | Status: DISCONTINUED | OUTPATIENT
Start: 2024-10-13 | End: 2024-10-16

## 2024-10-13 RX ORDER — AMLODIPINE BESYLATE 5 MG
5 TABLET ORAL DAILY
Refills: 0 | Status: DISCONTINUED | OUTPATIENT
Start: 2024-10-13 | End: 2024-10-14

## 2024-10-13 RX ORDER — GLUCAGON INJECTION, SOLUTION 0.5 MG/.1ML
1 INJECTION, SOLUTION SUBCUTANEOUS ONCE
Refills: 0 | Status: DISCONTINUED | OUTPATIENT
Start: 2024-10-13 | End: 2024-10-16

## 2024-10-13 RX ORDER — FUROSEMIDE 10 MG/ML
40 INJECTION INTRAVENOUS ONCE
Refills: 0 | Status: COMPLETED | OUTPATIENT
Start: 2024-10-13 | End: 2024-10-13

## 2024-10-13 RX ORDER — MAGNESIUM SULFATE 500 MG/ML
1 VIAL (ML) INJECTION ONCE
Refills: 0 | Status: COMPLETED | OUTPATIENT
Start: 2024-10-13 | End: 2024-10-13

## 2024-10-13 RX ORDER — TAMSULOSIN HCL 0.4 MG
0.4 CAPSULE ORAL AT BEDTIME
Refills: 0 | Status: DISCONTINUED | OUTPATIENT
Start: 2024-10-13 | End: 2024-10-16

## 2024-10-13 RX ADMIN — Medication 17 GRAM(S): at 11:34

## 2024-10-13 RX ADMIN — Medication 2 TABLET(S): at 21:02

## 2024-10-13 RX ADMIN — Medication 3 UNIT(S): at 16:40

## 2024-10-13 RX ADMIN — Medication 25 MILLIGRAM(S): at 05:03

## 2024-10-13 RX ADMIN — CHLORHEXIDINE GLUCONATE ORAL RINSE 1 APPLICATION(S): 1.2 SOLUTION DENTAL at 21:03

## 2024-10-13 RX ADMIN — Medication 100 GRAM(S): at 05:56

## 2024-10-13 RX ADMIN — Medication 5 MILLIGRAM(S): at 20:52

## 2024-10-13 RX ADMIN — Medication 0: at 21:03

## 2024-10-13 RX ADMIN — Medication 0.6 MILLIGRAM(S): at 17:36

## 2024-10-13 RX ADMIN — Medication 25 MILLIGRAM(S): at 17:36

## 2024-10-13 RX ADMIN — Medication 5000 UNIT(S): at 17:36

## 2024-10-13 RX ADMIN — Medication 100 MILLIGRAM(S): at 11:34

## 2024-10-13 RX ADMIN — Medication 100 MILLIGRAM(S): at 10:22

## 2024-10-13 RX ADMIN — PANTOPRAZOLE SODIUM 40 MILLIGRAM(S): 40 TABLET, DELAYED RELEASE ORAL at 05:03

## 2024-10-13 RX ADMIN — BISACODYL 5 MILLIGRAM(S): 5 TABLET, COATED ORAL at 05:05

## 2024-10-13 RX ADMIN — Medication 100 MILLIGRAM(S): at 02:38

## 2024-10-13 RX ADMIN — Medication 0.4 MILLIGRAM(S): at 21:02

## 2024-10-13 RX ADMIN — VANCOMYCIN HCL-SODIUM CHLORIDE IV SOLN 1.5 GM/250ML-0.9% 250 MILLIGRAM(S): 1.5-0.9/25 SOLUTION at 05:02

## 2024-10-13 RX ADMIN — Medication 1: at 16:40

## 2024-10-13 RX ADMIN — FUROSEMIDE 40 MILLIGRAM(S): 10 INJECTION INTRAVENOUS at 10:22

## 2024-10-13 RX ADMIN — BISACODYL 5 MILLIGRAM(S): 5 TABLET, COATED ORAL at 17:36

## 2024-10-13 RX ADMIN — Medication 5000 UNIT(S): at 05:05

## 2024-10-13 RX ADMIN — INSULIN GLARGINE 25 UNIT(S): 300 INJECTION, SOLUTION SUBCUTANEOUS at 12:08

## 2024-10-13 RX ADMIN — Medication 3 UNIT(S): at 11:35

## 2024-10-13 RX ADMIN — Medication 2: at 12:09

## 2024-10-13 RX ADMIN — Medication 5 MILLIGRAM(S): at 12:13

## 2024-10-13 RX ADMIN — Medication 325 MILLIGRAM(S): at 11:34

## 2024-10-13 RX ADMIN — Medication 40 MILLIEQUIVALENT(S): at 10:22

## 2024-10-13 NOTE — PHYSICAL THERAPY INITIAL EVALUATION ADULT - ADDITIONAL COMMENTS
Pt lives with significant other in pvt home with 7 PARAS, no stairs indoors, reports independent with functional mobility without AD prior to admission.

## 2024-10-13 NOTE — PROGRESS NOTE ADULT - SUBJECTIVE AND OBJECTIVE BOX
OPERATIVE PROCEDURE(s):  CABGx4              POD #     2                  84yMale  SURGEON(s): Pedro Pablo  SUBJECTIVE ASSESSMENT: pt seen and examined. no acute complaints at this time.   Vital Signs Last 24 Hrs  T(F): 98.6 (13 Oct 2024 08:00), Max: 100.4 (12 Oct 2024 12:00)  HR: 81 (13 Oct 2024 08:30) (68 - 82)  BP: 145/74 (13 Oct 2024 08:30) (123/60 - 154/72)  BP(mean): 102 (13 Oct 2024 08:30) (85 - 103)  ABP: 110/47 (12 Oct 2024 12:00) (110/47 - 124/60)  ABP(mean): 67 (12 Oct 2024 12:00)  RR: 14 (13 Oct 2024 08:30) (9 - 28)  SpO2: 99% (13 Oct 2024 08:30) (93% - 100%)  CVP(mm Hg): 12 (12 Oct 2024 11:00)  CO: 6.3 (12 Oct 2024 10:00)  CI: 3 (12 Oct 2024 10:00)  PA: 34/19 (12 Oct 2024 11:00)  SVR: 811 (12 Oct 2024 10:00)    I&O's Detail    12 Oct 2024 07:01  -  13 Oct 2024 07:00  --------------------------------------------------------  IN:    Albumin 5%  - 500 mL: 800 mL    Insulin: 3 mL    Insulin: 36 mL    IV PiggyBack: 100 mL    IV PiggyBack: 250 mL    IV PiggyBack: 100 mL    Lactated Ringers: 600 mL    Lactated Ringers: 300 mL    Oral Fluid: 800 mL    sodium chloride 0.9%: 40 mL  Total IN: 3029 mL    OUT:    Chest Tube (mL): 12 mL    Chest Tube (mL): 0 mL    Chest Tube (mL): 6 mL    Indwelling Catheter - Urethral (mL): 760 mL  Total OUT: 778 mL        Net:   I&O's Detail    11 Oct 2024 07:01  -  12 Oct 2024 07:00  --------------------------------------------------------  Total NET: 437.5 mL      12 Oct 2024 07:01  -  13 Oct 2024 07:00  --------------------------------------------------------  Total NET: 2251 mL        CAPILLARY BLOOD GLUCOSE      POCT Blood Glucose.: 179 mg/dL (13 Oct 2024 08:39)  POCT Blood Glucose.: 127 mg/dL (13 Oct 2024 05:47)  POCT Blood Glucose.: 122 mg/dL (13 Oct 2024 04:33)  POCT Blood Glucose.: 120 mg/dL (13 Oct 2024 01:18)  POCT Blood Glucose.: 108 mg/dL (13 Oct 2024 00:11)  POCT Blood Glucose.: 121 mg/dL (12 Oct 2024 22:49)  POCT Blood Glucose.: 160 mg/dL (12 Oct 2024 20:28)  POCT Blood Glucose.: 195 mg/dL (12 Oct 2024 18:42)  POCT Blood Glucose.: 183 mg/dL (12 Oct 2024 17:43)  POCT Blood Glucose.: 200 mg/dL (12 Oct 2024 16:31)  POCT Blood Glucose.: 240 mg/dL (12 Oct 2024 15:49)  POCT Blood Glucose.: 116 mg/dL (12 Oct 2024 11:35)    Physical Exam:  General: NAD; A&Ox3, no focal deficits clear speech  Cardiac: S1/S2, RRR, no murmur, no rubs  Lungs: unlabored respirations, CTA b/l, no wheeze, no rales, no crackles  Abdomen: Soft/NT/ND; positive bowel sounds x 4  Sternum: Intact, no click, incision healing well with no drainage  Incisions: Incisions clean/dry/intact  Extremities: mild edema b/l lower extremities; good capillary refill; no cyanosis; palpable 1+ pedal pulses b/l    Central Venous Catheter: Yes[x]  No[] , If Yes indication:    critical       Day #2  Jernigan Catheter: Yes  [x] , No  [] , If yes indication:      strict i.o                Day #2  EPICARDIAL WIRES:  [x] YES [] NO                                              Day #2  BOWEL MOVEMENT:  [] YES [x] NO, If No, Timing since last BM:      Day #      LABS:                        9.9[L]  19.25[H] )-----------( 192      ( 13 Oct 2024 02:00 )             30.2[L]                        10.8[L]  18.78[H] )-----------( 212      ( 12 Oct 2024 02:00 )             33.1[L]    10-13    141  |  107  |  32[H]  ----------------------------<  108[H]  4.3   |  23  |  1.2  10-12    142  |  106  |  30[H]  ----------------------------<  199[H]  4.0   |  22  |  1.4    Ca    8.5      13 Oct 2024 02:00  Mg     1.9     10-13    TPro  5.1[L] [6.0 - 8.0]  /  Alb  3.4[L] [3.5 - 5.2]  /  TBili  0.6 [0.2 - 1.2]  /  DBili  x   /  AST  42[H] [0 - 41]  /  ALT  65[H] [0 - 41]  /  AlkPhos  82 [30 - 115]  10-13    PT/INR - ( 11 Oct 2024 15:04 )   PT: ;   INR: 1.29 ratio         PTT - ( 11 Oct 2024 15:04 )  PTT:26.9 sec  Urinalysis Basic - ( 13 Oct 2024 02:00 )    Color: x / Appearance: x / SG: x / pH: x  Gluc: 108 mg/dL / Ketone: x  / Bili: x / Urobili: x   Blood: x / Protein: x / Nitrite: x   Leuk Esterase: x / RBC: x / WBC x   Sq Epi: x / Non Sq Epi: x / Bacteria: x      ABG - ( 12 Oct 2024 04:10 )  pH: 7.33  /  pCO2: 33    /  pO2: 76    / HCO3: 17    / Base Excess: -7.6  /  SaO2: 98.6  /  LA: 0.7              RADIOLOGY & ADDITIONAL TESTS:  CXR:  EKG:  MEDICATIONS  (STANDING):  allopurinol 100 milliGRAM(s) Oral daily  aspirin enteric coated 325 milliGRAM(s) Oral daily  bisacodyl 5 milliGRAM(s) Oral every 12 hours  ceFAZolin   IVPB 2000 milliGRAM(s) IV Intermittent every 8 hours  chlorhexidine 2% Cloths 1 Application(s) Topical daily  dextrose 50% Injectable 50 milliLiter(s) IV Push every 15 minutes  heparin   Injectable 5000 Unit(s) SubCutaneous every 12 hours  insulin regular Infusion 1 Unit(s)/Hr (1 mL/Hr) IV Continuous <Continuous>  ipratropium    for Nebulization 500 MICROGram(s) Nebulizer every 6 hours  metoprolol tartrate 25 milliGRAM(s) Oral two times a day  ondansetron Injectable 4 milliGRAM(s) IV Push once  pantoprazole    Tablet 40 milliGRAM(s) Oral before breakfast  polyethylene glycol 3350 17 Gram(s) Oral daily  senna 2 Tablet(s) Oral at bedtime  sodium chloride 0.9%. 1000 milliLiter(s) (20 mL/Hr) IV Continuous <Continuous>    MEDICATIONS  (PRN):  oxyCODONE    IR 5 milliGRAM(s) Oral every 4 hours PRN Moderate Pain (4 - 6)  oxyCODONE    IR 10 milliGRAM(s) Oral every 4 hours PRN Severe Pain (7 - 10)    HEPARIN:  [] YES [] NO  Dose: XX UNITS/HR UNITS Q8H  LOVENOX:[] YES [] NO  Dose: XX mg Q24H  COUMADIN: []  YES [] NO  Dose: XX mg  Q24H  SCD's: YES b/l  GI Prophylaxis: Protonix [], Pepcid [], None [], (Contra-indication:.....)    Post-Op Beta-Blockers: Yes [], No[], If No, then contraindication:  Post-Op Aspirin: Yes [],  No [], If No, then contraindication:  Post-Op Statin: Yes [], No[], If No, then contraindication:  Allergies    No Known Allergies    Intolerances      Ambulation/Activity Status:    Assessment/Plan:  84y Male status-post .....  - Case and plan discussed with CTU Intensivist and CT Surgeon - Dr. Portillo/Pedro Pablo/Bobbi  - Continue CTU supportive care    - Continue DVT/GI prophylaxis  - Incentive Spirometry 10 times an hour  - Continue to advance physical activity as tolerated and continue PT/OT as directed  1. CAD: Continue ASA, statin, BB  2. HTN:   3. A. Fib:   4. COPD/Hypoxia:   5. DM/Glucose Control:     Social Service Disposition:     OPERATIVE PROCEDURE(s):  CABGx4              POD #     2                  84yMale  SURGEON(s): Pedro Pablo  SUBJECTIVE ASSESSMENT: pt seen and examined. no acute complaints at this time.   Vital Signs Last 24 Hrs  T(F): 98.6 (13 Oct 2024 08:00), Max: 100.4 (12 Oct 2024 12:00)  HR: 81 (13 Oct 2024 08:30) (68 - 82)  BP: 145/74 (13 Oct 2024 08:30) (123/60 - 154/72)  BP(mean): 102 (13 Oct 2024 08:30) (85 - 103)  ABP: 110/47 (12 Oct 2024 12:00) (110/47 - 124/60)  ABP(mean): 67 (12 Oct 2024 12:00)  RR: 14 (13 Oct 2024 08:30) (9 - 28)  SpO2: 99% (13 Oct 2024 08:30) (93% - 100%)  CVP(mm Hg): 12 (12 Oct 2024 11:00)  CO: 6.3 (12 Oct 2024 10:00)  CI: 3 (12 Oct 2024 10:00)  PA: 34/19 (12 Oct 2024 11:00)  SVR: 811 (12 Oct 2024 10:00)    I&O's Detail    12 Oct 2024 07:01  -  13 Oct 2024 07:00  --------------------------------------------------------  IN:    Albumin 5%  - 500 mL: 800 mL    Insulin: 3 mL    Insulin: 36 mL    IV PiggyBack: 100 mL    IV PiggyBack: 250 mL    IV PiggyBack: 100 mL    Lactated Ringers: 600 mL    Lactated Ringers: 300 mL    Oral Fluid: 800 mL    sodium chloride 0.9%: 40 mL  Total IN: 3029 mL    OUT:    Chest Tube (mL): 12 mL    Chest Tube (mL): 0 mL    Chest Tube (mL): 6 mL    Indwelling Catheter - Urethral (mL): 760 mL  Total OUT: 778 mL        Net:   I&O's Detail    11 Oct 2024 07:01  -  12 Oct 2024 07:00  --------------------------------------------------------  Total NET: 437.5 mL      12 Oct 2024 07:01  -  13 Oct 2024 07:00  --------------------------------------------------------  Total NET: 2251 mL        CAPILLARY BLOOD GLUCOSE      POCT Blood Glucose.: 179 mg/dL (13 Oct 2024 08:39)  POCT Blood Glucose.: 127 mg/dL (13 Oct 2024 05:47)  POCT Blood Glucose.: 122 mg/dL (13 Oct 2024 04:33)  POCT Blood Glucose.: 120 mg/dL (13 Oct 2024 01:18)  POCT Blood Glucose.: 108 mg/dL (13 Oct 2024 00:11)  POCT Blood Glucose.: 121 mg/dL (12 Oct 2024 22:49)  POCT Blood Glucose.: 160 mg/dL (12 Oct 2024 20:28)  POCT Blood Glucose.: 195 mg/dL (12 Oct 2024 18:42)  POCT Blood Glucose.: 183 mg/dL (12 Oct 2024 17:43)  POCT Blood Glucose.: 200 mg/dL (12 Oct 2024 16:31)  POCT Blood Glucose.: 240 mg/dL (12 Oct 2024 15:49)  POCT Blood Glucose.: 116 mg/dL (12 Oct 2024 11:35)    Physical Exam:  General: NAD; A&Ox3, no focal deficits clear speech  Cardiac: S1/S2, RRR, no murmur, no rubs  Lungs: unlabored respirations, CTA b/l, no wheeze, no rales, no crackles  Abdomen: Soft/NT/ND; positive bowel sounds x 4  Sternum: Intact, no click, incision healing well with no drainage  Incisions: Incisions clean/dry/intact  Extremities: mild edema b/l lower extremities; good capillary refill; no cyanosis; palpable 1+ pedal pulses b/l    Central Venous Catheter: Yes[x]  No[] , If Yes indication:    critical       Day #2  Jernigan Catheter: Yes  [x] , No  [] , If yes indication:      strict i.o                Day #2  EPICARDIAL WIRES:  [x] YES [] NO                                              Day #2  BOWEL MOVEMENT:  [] YES [x] NO, If No, Timing since last BM:      Day #      LABS:                        9.9[L]  19.25[H] )-----------( 192      ( 13 Oct 2024 02:00 )             30.2[L]                        10.8[L]  18.78[H] )-----------( 212      ( 12 Oct 2024 02:00 )             33.1[L]    10-13    141  |  107  |  32[H]  ----------------------------<  108[H]  4.3   |  23  |  1.2  10-12    142  |  106  |  30[H]  ----------------------------<  199[H]  4.0   |  22  |  1.4    Ca    8.5      13 Oct 2024 02:00  Mg     1.9     10-13    TPro  5.1[L] [6.0 - 8.0]  /  Alb  3.4[L] [3.5 - 5.2]  /  TBili  0.6 [0.2 - 1.2]  /  DBili  x   /  AST  42[H] [0 - 41]  /  ALT  65[H] [0 - 41]  /  AlkPhos  82 [30 - 115]  10-13    PT/INR - ( 11 Oct 2024 15:04 )   PT: ;   INR: 1.29 ratio         PTT - ( 11 Oct 2024 15:04 )  PTT:26.9 sec  Urinalysis Basic - ( 13 Oct 2024 02:00 )    Color: x / Appearance: x / SG: x / pH: x  Gluc: 108 mg/dL / Ketone: x  / Bili: x / Urobili: x   Blood: x / Protein: x / Nitrite: x   Leuk Esterase: x / RBC: x / WBC x   Sq Epi: x / Non Sq Epi: x / Bacteria: x      ABG - ( 12 Oct 2024 04:10 )  pH: 7.33  /  pCO2: 33    /  pO2: 76    / HCO3: 17    / Base Excess: -7.6  /  SaO2: 98.6  /  LA: 0.7              RADIOLOGY & ADDITIONAL TESTS:  CXR: < from: Xray Chest 1 View- PORTABLE-Urgent (Xray Chest 1 View- PORTABLE-Urgent .) (10.12.24 @ 11:21) >    IMPRESSION:    Interim removal of bilateral chest tubes, no pneumothoraces. Right   basilar opacity, new. Redemonstration left basilar opacity/pleural  effusion and cardiomegaly    < end of copied text >    EKG: < from: 12 Lead ECG (10.13.24 @ 07:05) >    Ventricular Rate 75 BPM    Atrial Rate 75 BPM    P-R Interval 156 ms    QRS Duration 88 ms    Q-T Interval 396 ms    QTC Calculation(Bazett) 442 ms    P Axis 45 degrees    R Axis 5 degrees    T Axis 14 degrees    Diagnosis Line Normal sinus rhythm  Inferior infarct , possibly acute  Anterior injury pattern  ** ** ACUTE MI / STEMI ** **  Abnormal ECG    < end of copied text >    MEDICATIONS  (STANDING):  allopurinol 100 milliGRAM(s) Oral daily  aspirin enteric coated 325 milliGRAM(s) Oral daily  bisacodyl 5 milliGRAM(s) Oral every 12 hours  ceFAZolin   IVPB 2000 milliGRAM(s) IV Intermittent every 8 hours  chlorhexidine 2% Cloths 1 Application(s) Topical daily  dextrose 50% Injectable 50 milliLiter(s) IV Push every 15 minutes  heparin   Injectable 5000 Unit(s) SubCutaneous every 12 hours  insulin regular Infusion 1 Unit(s)/Hr (1 mL/Hr) IV Continuous <Continuous>  ipratropium    for Nebulization 500 MICROGram(s) Nebulizer every 6 hours  metoprolol tartrate 25 milliGRAM(s) Oral two times a day  ondansetron Injectable 4 milliGRAM(s) IV Push once  pantoprazole    Tablet 40 milliGRAM(s) Oral before breakfast  polyethylene glycol 3350 17 Gram(s) Oral daily  senna 2 Tablet(s) Oral at bedtime  sodium chloride 0.9%. 1000 milliLiter(s) (20 mL/Hr) IV Continuous <Continuous>    MEDICATIONS  (PRN):  oxyCODONE    IR 5 milliGRAM(s) Oral every 4 hours PRN Moderate Pain (4 - 6)  oxyCODONE    IR 10 milliGRAM(s) Oral every 4 hours PRN Severe Pain (7 - 10)    HEPARIN:  [x] YES [] NO  Dose: 5000 UNITS Q12H  SCD's: YES b/l  GI Prophylaxis: Protonix [x], Pepcid [], None [], (Contra-indication:.....)    Post-Op Beta-Blockers: Yes [x], No[], If No, then contraindication:  Post-Op Aspirin: Yes [x],  No [], If No, then contraindication:  Post-Op Statin: Yes [x], No[], If No, then contraindication:      Allergies    No Known Allergies    Intolerances      Ambulation/Activity Status: ambulate     Assessment/Plan:  84y Male status-post CABGx4 POD#2  - Case and plan discussed with CTU Intensivist and CT Surgeon - Dr. Portillo/Pedro Pablo/Bobbi  - Continue CTU supportive care    - Continue DVT/GI prophylaxis  - Incentive Spirometry 10 times an hour  - Continue to advance physical activity as tolerated and continue PT/OT as directed  1. CAD: Continue ASA, statin, BB, pain control prn, d/c sump, cts, and swan  2. HTN: start low dose metoprolol, cont to monitor, start norvasc   3. A. Fib prophylaxis: bb, cont to monitor electrolytes   4. Post-op Hypoxia: cont nebs, wean o2 as tolerated, encourage incentive spirometry   5. DM/Glucose Control: pt has HAGMA/DKA over night- closed anion gap now was on insulin gtt, start lantus 25 and humalog 3 with sliding scale   6. pericarditis- start colchicine     Social Service Disposition:  pt to assess

## 2024-10-13 NOTE — PHYSICAL THERAPY INITIAL EVALUATION ADULT - GAIT DEVIATIONS NOTED, PT EVAL
Rapidly improving frandy over course of amb, mild SOB, SPO2 93% s/p amb on NC 4 L/m./decreased step length

## 2024-10-13 NOTE — PHYSICAL THERAPY INITIAL EVALUATION ADULT - GAIT TRAINING, PT EVAL
by discharge: Pt will amb 400 ft independently using RW as needed and negotiate 7 steps with supervision using handrail.

## 2024-10-13 NOTE — PROGRESS NOTE ADULT - SUBJECTIVE AND OBJECTIVE BOX
CTU Attending Progress Daily Note     13 Oct 2024 18:50  Procedure:CABG x4  POD#: 2  pt doing well chest tubes out ambulating     Off Pump case  C 1.5  UO 1400     He has history of DM (diabetes mellitus)    HTN (hypertension)    HLD (hyperlipidemia)      HPI:  84 years old male patient with pmh of HLD, DM, HTN brought to the ed for fall. Hx taken from the patient and is aox3 at time of the interview.  Patient states that he was going down to his basement, and when he got there the next thing he remembers is lying himself on the floor on his back. He does not know wether he lost consciousness or not not but thinks that he lost conciousnes as he does not know what actually happened and how he found himself on the floor. On further inquiry, patient states that he was trying to get up from the floor then he felt that his left shoulder is weak, painful and stiff. on trying to get up from the ground, he state that he hit his head against the ground .  He was finally able to gain his strength back and get up, he went up to his room and slept. His sons called his PCP who advised him to go to the ER. Further review of the systems is negative for any headache, light headiness dizziness palpitations, chest pain, nausea, vomit, diarrhea, dysuria , frequency, urgency, extremity weakness and other significant complaints.                (05 Oct 2024 02:37)      Hospital Course:    OBJECTIVE:  ICU Vital Signs Last 24 Hrs  T(C): 37.1 (13 Oct 2024 16:00), Max: 37.9 (12 Oct 2024 20:00)  T(F): 98.8 (13 Oct 2024 16:00), Max: 100.2 (12 Oct 2024 20:00)  HR: 78 (13 Oct 2024 18:00) (68 - 112)  BP: 125/65 (13 Oct 2024 18:00) (123/60 - 170/77)  BP(mean): 90 (13 Oct 2024 18:00) (86 - 110)  ABP: --  ABP(mean): --  RR: 17 (13 Oct 2024 18:00) (6 - 31)  SpO2: 94% (13 Oct 2024 18:00) (93% - 100%)    O2 Parameters below as of 13 Oct 2024 18:00  Patient On (Oxygen Delivery Method): nasal cannula  O2 Flow (L/min): 3        I&O's Summary    12 Oct 2024 07:01  -  13 Oct 2024 07:00  --------------------------------------------------------  IN: 3029 mL / OUT: 808 mL / NET: 2221 mL    13 Oct 2024 07:01  -  13 Oct 2024 18:50  --------------------------------------------------------  IN: 830 mL / OUT: 1535 mL / NET: -705 mL      I&O's Detail    12 Oct 2024 07:01  -  13 Oct 2024 07:00  --------------------------------------------------------  IN:    Albumin 5%  - 500 mL: 800 mL    Insulin: 3 mL    Insulin: 36 mL    IV PiggyBack: 100 mL    IV PiggyBack: 250 mL    IV PiggyBack: 100 mL    Lactated Ringers: 600 mL    Lactated Ringers: 300 mL    Oral Fluid: 800 mL    sodium chloride 0.9%: 40 mL  Total IN: 3029 mL    OUT:    Chest Tube (mL): 12 mL    Chest Tube (mL): 0 mL    Chest Tube (mL): 6 mL    Indwelling Catheter - Urethral (mL): 790 mL  Total OUT: 808 mL    Total NET: 2221 mL      13 Oct 2024 07:01  -  13 Oct 2024 18:50  --------------------------------------------------------  IN:    IV PiggyBack: 50 mL    Oral Fluid: 780 mL  Total IN: 830 mL    OUT:    Indwelling Catheter - Urethral (mL): 1535 mL  Total OUT: 1535 mL    Total NET: -705 mL        Adult Advanced Hemodynamics Last 24 Hrs  CVP(mm Hg): --  CVP(cm H2O): --  CO: --  CI: --  PA: --  PA(mean): --  PCWP: --  SVR: --  SVRI: --  PVR: --  PVRI: --    CAPILLARY BLOOD GLUCOSE      POCT Blood Glucose.: 200 mg/dL (13 Oct 2024 16:23)  POCT Blood Glucose.: 201 mg/dL (13 Oct 2024 11:17)  POCT Blood Glucose.: 179 mg/dL (13 Oct 2024 08:39)  POCT Blood Glucose.: 127 mg/dL (13 Oct 2024 05:47)  POCT Blood Glucose.: 122 mg/dL (13 Oct 2024 04:33)  POCT Blood Glucose.: 120 mg/dL (13 Oct 2024 01:18)  POCT Blood Glucose.: 108 mg/dL (13 Oct 2024 00:11)  POCT Blood Glucose.: 121 mg/dL (12 Oct 2024 22:49)  POCT Blood Glucose.: 160 mg/dL (12 Oct 2024 20:28)    LABS:  ABG - ( 12 Oct 2024 04:10 )  pH, Arterial: 7.33  pH, Blood: x     /  pCO2: 33    /  pO2: 76    / HCO3: 17    / Base Excess: -7.6  /  SaO2: 98.6                                    9.9    19.25 )-----------( 192      ( 13 Oct 2024 02:00 )             30.2     10-13    141  |  107  |  32[H]  ----------------------------<  108[H]  4.3   |  23  |  1.2    Ca    8.5      13 Oct 2024 02:00  Mg     1.9     10-13    TPro  5.1[L]  /  Alb  3.4[L]  /  TBili  0.6  /  DBili  x   /  AST  42[H]  /  ALT  65[H]  /  AlkPhos  82  10-13      Urinalysis Basic - ( 13 Oct 2024 02:00 )    Color: x / Appearance: x / SG: x / pH: x  Gluc: 108 mg/dL / Ketone: x  / Bili: x / Urobili: x   Blood: x / Protein: x / Nitrite: x   Leuk Esterase: x / RBC: x / WBC x   Sq Epi: x / Non Sq Epi: x / Bacteria: x        Home Medications:  ALLOPURINOL 100 MG TABLET: 1 tab(s) orally once a day (05 Oct 2024 04:51)  ATORVASTATIN 40 MG TABLET: 1 tab(s) orally once a day (05 Oct 2024 04:51)  dapagliflozin 5 mg oral tablet: 1 tab(s) orally once a day (05 Oct 2024 13:01)  ezetimibe 10 mg oral tablet: 1 tab(s) orally once a day (05 Oct 2024 04:51)  JANUMET 50/500 MG TABLET MS: TAKE 1 TABLET EVERY DAY (05 Oct 2024 04:51)  RAMIPRIL 10 MG CAPS: TAKE 1 CAPSULE EVERY DAY (05 Oct 2024 04:51)    HOSPITAL MEDICATIONS:  MEDICATIONS  (STANDING):  allopurinol 100 milliGRAM(s) Oral daily  amLODIPine   Tablet 5 milliGRAM(s) Oral daily  aspirin enteric coated 325 milliGRAM(s) Oral daily  bisacodyl 5 milliGRAM(s) Oral every 12 hours  chlorhexidine 2% Cloths 1 Application(s) Topical daily  colchicine 0.6 milliGRAM(s) Oral two times a day  dextrose 5%. 1000 milliLiter(s) (50 mL/Hr) IV Continuous <Continuous>  dextrose 5%. 1000 milliLiter(s) (100 mL/Hr) IV Continuous <Continuous>  dextrose 50% Injectable 50 milliLiter(s) IV Push every 15 minutes  glucagon  Injectable 1 milliGRAM(s) IntraMuscular once  heparin   Injectable 5000 Unit(s) SubCutaneous every 12 hours  insulin glargine Injectable (LANTUS) 25 Unit(s) SubCutaneous every morning  insulin lispro (ADMELOG) corrective regimen sliding scale   SubCutaneous three times a day before meals  insulin lispro (ADMELOG) corrective regimen sliding scale   SubCutaneous at bedtime  insulin lispro Injectable (ADMELOG) 3 Unit(s) SubCutaneous three times a day before meals  insulin regular Infusion 1 Unit(s)/Hr (1 mL/Hr) IV Continuous <Continuous>  ipratropium    for Nebulization 500 MICROGram(s) Nebulizer every 6 hours  metoprolol tartrate 25 milliGRAM(s) Oral two times a day  ondansetron Injectable 4 milliGRAM(s) IV Push once  pantoprazole    Tablet 40 milliGRAM(s) Oral before breakfast  polyethylene glycol 3350 17 Gram(s) Oral daily  senna 2 Tablet(s) Oral at bedtime  sodium chloride 0.9%. 1000 milliLiter(s) (20 mL/Hr) IV Continuous <Continuous>  tamsulosin 0.4 milliGRAM(s) Oral at bedtime    MEDICATIONS  (PRN):  dextrose Oral Gel 15 Gram(s) Oral once PRN Blood Glucose LESS THAN 70 milliGRAM(s)/deciliter  melatonin 5 milliGRAM(s) Oral at bedtime PRN Insomnia  oxyCODONE    IR 10 milliGRAM(s) Oral every 4 hours PRN Severe Pain (7 - 10)  oxyCODONE    IR 5 milliGRAM(s) Oral every 4 hours PRN Moderate Pain (4 - 6)    RADIOLOGY:  Chest X-ray Reviewed  < from: Xray Chest 1 View- PORTABLE-Routine (10.13.24 @ 06:28) >      IMPRESSION:    Bilateral lower lobe opacity/pleural effusion no change. No air leak.    --- End of Report ---            < end of copied text >    REVIEW OF SYSTEMS:  CONSTITUTIONAL: [X] all negative; [ ] weakness, [ ] fevers, [ ] chills  EYES/ENT: [X] all negative; [ ] visual changes, [ ] vertigo, [ ] throat pain   NECK: [X] all negative; [ ] pain, [ ] stiffness  RESPIRATORY: [] all negative, [ ] cough, [ ] wheezing, [ ] hemoptysis, [ ] shortness of breath  CARDIOVASCULAR: [] all negative; [ ] chest pain, [ ] palpitations, [ ] orthopnea  GASTROINTESTINAL: [X] all negative; [ ]abdominal pain, [ ] nausea, [ ] vomiting, [ ] hematemesis, [ ] diarrhea, [ ] constipation, [ ] melena, [ ] hematochezia.  GENITOURINARY: [X] all negative; [ ] dysuria, [ ] frequency, [ ] hematuria  NEUROLOGICAL: [X] all negative; [ ] numbness, [ ] weakness  SKIN: [X] all negative; [ ] itching, [ ] burning, [ ] rashes, [ ] lesions   All other review of systems is negative unless indicated above.  [  ] Unable to assess ROS because     PHYSICAL EXAM:          CONSTITUTIONAL: Well-developed; well-nourished; in no acute distress.   	SKIN: warm, dry  	HEAD: Normocephalic; atraumatic.  	EYES: PERRL, EOM, no conj injection, sclera clear  	ENT: No nasal discharge; airway clear.  	NECK: Supple; non tender.   	CARD: S1, S2 normal; no murmurs, gallops, or rubs. Regular rate and rhythm.  no carotid bruits  	RESP: CTA B/L; good air movement No wheezes, rales or rhonchi.  	ABD: Soft, not tender, not distended,  no rebound or guarding, bowel sounds present  	EXT: Normal ROM.  No clubbing, cyanosis or edema.   warm and well perfused.  pulses palpable  	NEURO: Alert, awake, motor 5/5 R, 5/5 L

## 2024-10-13 NOTE — PROGRESS NOTE ADULT - ASSESSMENT
Assessment   s/p CABG x4  POD 3  NSTMI on admission  currently progressing well    ASA lopressor and s  Lovnox 40 daily for DVT prophylaxis   LFT improving start statins today  Plan:    Neuro:  Multimodal pain management  Tylenol, Lidoderm patch, gabapentin, opiates as needed  Monitor neuro status in the post op period    CV:  S/P  CABG     NSTMi on admission 10/5    HTN on lopressor 12.5 q12  ASA,Beta blcockers and   statin       Resp:  Acute pulmonary insufficiency post surgery   Supplemental oxygen to maintain sats > 92%  Continuous pulse oximetry monitoring  IS / Chest PT  Lasix 20 q12   OOBTC and Ambulate  Nebulizers as needed    GI:  Heart healthy diet  Bowel Regimen - escalate as needed  PUD ppx per protocol    Renal  High risk for ARIELLE post surgery  Monitor UOP, lytes, creatinine  Diuretics as needed for negative fluid balance  Keep K > 4, Mg > 2    Endo:  Tight glucose control per CTICU protocol  Insulin lantus 25 and Nov 3 pre barndial  Transition to Lantus / SSI and premeal insulin as needed    Heme:  Acute blood loss anemia post surgery  High risk for thrombocytopenia  DVT prophylaxis once bleeding risk post surgery is minimized    ID:  Perioperative prophylactic abx per protocol  Monitor fever curve and WBC count  Remove TLC when no longer indicated        Upon my evaluation, the above patient has a high probability of imminent or life threatening deterioration due to a high risk for Shock, Cardiogenic shock, arrythmia, acute blood loss, acute kidney injury and acute pulmonary insufficiency which required my direct attention, intervention, and personal management.  Total time was 55 minutes which includes review of radiology results, ordering, interpreting and reviewing diagnostic studies / lab tests with immediate assessment and treatment, consultant collaboration on findings and treatment options, monitoring for potential decompensation, obtaining history from family, EMT, nursing home staff and/or treating physicians; directing the titration of pressors, oxygen support devices, fluid resucitation, interpretation of cardiac output measurements, interpretation of radiological assessments, interpretation of EKG / rhythm strips, telemetry.  This time did not overlap with the management of other patients or performing separately reportable procedures.      Management of this patient was discussed with the CT surgery attending and mid-level providers.    I acknowledge the use of copied documentation.  All copy forward documentation is my own and has been reviewed and revised as appropriate.  If no changes were made, it is because that information remains the same. Assessment   s/p CABG x4  POD 3  NSTMI on admission  currently progressing well    ASA lopressor and s  Lovnox 40 daily for DVT prophylaxis   LFT improving start statins today  Plan:    Neuro:  Multimodal pain management  Tylenol, Lidoderm patch, gabapentin, opiates as needed  Monitor neuro status in the post op period    CV:  S/P  CABG     NSTMi on admission 10/5    HTN on lopressor 12.5 q12  ASA,Beta blcockers and   statin       Resp:  Acute pulmonary insufficiency post surgery   Supplemental oxygen to maintain sats > 92%  Continuous pulse oximetry monitoring  IS / Chest PT  Lasix 20 q12   OOBTC and Ambulate  Nebulizers as needed    GI:  Heart healthy diet  Bowel Regimen - escalate as needed  PUD ppx per protocol    Renal  High risk for ARIELLE post surgery  Monitor UOP, lytes, creatinine  Diuretics as needed for negative fluid balance  Keep K > 4, Mg > 2    Endo:  Tight glucose control per CTICU protocol  Insulin lantus 25 and Nov 3 pre barndial  Transition to Lantus / SSI and premeal insulin as needed    Heme:  Acute blood loss anemia post surgery  High risk for thrombocytopenia  DVT prophylaxis once bleeding risk post surgery is minimized    ID:  Perioperative prophylactic abx per protocol  Monitor fever curve and WBC count  Remove TLC when no longer indicated    After Further eveluation sepsis has been ruled out          Upon my evaluation, the above patient has a high probability of imminent or life threatening deterioration due to a high risk for Shock, Cardiogenic shock, arrythmia, acute blood loss, acute kidney injury and acute pulmonary insufficiency which required my direct attention, intervention, and personal management.  Total time was 55 minutes which includes review of radiology results, ordering, interpreting and reviewing diagnostic studies / lab tests with immediate assessment and treatment, consultant collaboration on findings and treatment options, monitoring for potential decompensation, obtaining history from family, EMT, nursing home staff and/or treating physicians; directing the titration of pressors, oxygen support devices, fluid resucitation, interpretation of cardiac output measurements, interpretation of radiological assessments, interpretation of EKG / rhythm strips, telemetry.  This time did not overlap with the management of other patients or performing separately reportable procedures.      Management of this patient was discussed with the CT surgery attending and mid-level providers.    I acknowledge the use of copied documentation.  All copy forward documentation is my own and has been reviewed and revised as appropriate.  If no changes were made, it is because that information remains the same.

## 2024-10-14 LAB
ALBUMIN SERPL ELPH-MCNC: 3.1 G/DL — LOW (ref 3.5–5.2)
ALP SERPL-CCNC: 81 U/L — SIGNIFICANT CHANGE UP (ref 30–115)
ALT FLD-CCNC: 32 U/L — SIGNIFICANT CHANGE UP (ref 0–41)
ANION GAP SERPL CALC-SCNC: 11 MMOL/L — SIGNIFICANT CHANGE UP (ref 7–14)
AST SERPL-CCNC: 24 U/L — SIGNIFICANT CHANGE UP (ref 0–41)
BASOPHILS # BLD AUTO: 0.02 K/UL — SIGNIFICANT CHANGE UP (ref 0–0.2)
BASOPHILS NFR BLD AUTO: 0.1 % — SIGNIFICANT CHANGE UP (ref 0–1)
BILIRUB SERPL-MCNC: 0.7 MG/DL — SIGNIFICANT CHANGE UP (ref 0.2–1.2)
BUN SERPL-MCNC: 38 MG/DL — HIGH (ref 10–20)
CALCIUM SERPL-MCNC: 8.5 MG/DL — SIGNIFICANT CHANGE UP (ref 8.4–10.5)
CHLORIDE SERPL-SCNC: 104 MMOL/L — SIGNIFICANT CHANGE UP (ref 98–110)
CO2 SERPL-SCNC: 24 MMOL/L — SIGNIFICANT CHANGE UP (ref 17–32)
CREAT SERPL-MCNC: 1.3 MG/DL — SIGNIFICANT CHANGE UP (ref 0.7–1.5)
EGFR: 55 ML/MIN/1.73M2 — LOW
EOSINOPHIL # BLD AUTO: 0.02 K/UL — SIGNIFICANT CHANGE UP (ref 0–0.7)
EOSINOPHIL NFR BLD AUTO: 0.1 % — SIGNIFICANT CHANGE UP (ref 0–8)
GLUCOSE BLDC GLUCOMTR-MCNC: 176 MG/DL — HIGH (ref 70–99)
GLUCOSE BLDC GLUCOMTR-MCNC: 178 MG/DL — HIGH (ref 70–99)
GLUCOSE BLDC GLUCOMTR-MCNC: 190 MG/DL — HIGH (ref 70–99)
GLUCOSE BLDC GLUCOMTR-MCNC: 202 MG/DL — HIGH (ref 70–99)
GLUCOSE BLDC GLUCOMTR-MCNC: 215 MG/DL — HIGH (ref 70–99)
GLUCOSE SERPL-MCNC: 142 MG/DL — HIGH (ref 70–99)
HCT VFR BLD CALC: 28.3 % — LOW (ref 42–52)
HGB BLD-MCNC: 9.2 G/DL — LOW (ref 14–18)
IMM GRANULOCYTES NFR BLD AUTO: 0.7 % — HIGH (ref 0.1–0.3)
LYMPHOCYTES # BLD AUTO: 1.18 K/UL — LOW (ref 1.2–3.4)
LYMPHOCYTES # BLD AUTO: 5.9 % — LOW (ref 20.5–51.1)
MAGNESIUM SERPL-MCNC: 2 MG/DL — SIGNIFICANT CHANGE UP (ref 1.8–2.4)
MCHC RBC-ENTMCNC: 31.1 PG — HIGH (ref 27–31)
MCHC RBC-ENTMCNC: 32.5 G/DL — SIGNIFICANT CHANGE UP (ref 32–37)
MCV RBC AUTO: 95.6 FL — HIGH (ref 80–94)
MONOCYTES # BLD AUTO: 1.31 K/UL — HIGH (ref 0.1–0.6)
MONOCYTES NFR BLD AUTO: 6.6 % — SIGNIFICANT CHANGE UP (ref 1.7–9.3)
NEUTROPHILS # BLD AUTO: 17.24 K/UL — HIGH (ref 1.4–6.5)
NEUTROPHILS NFR BLD AUTO: 86.6 % — HIGH (ref 42.2–75.2)
NRBC # BLD: 0 /100 WBCS — SIGNIFICANT CHANGE UP (ref 0–0)
PLATELET # BLD AUTO: 220 K/UL — SIGNIFICANT CHANGE UP (ref 130–400)
PMV BLD: 10.8 FL — HIGH (ref 7.4–10.4)
POTASSIUM SERPL-MCNC: 4.2 MMOL/L — SIGNIFICANT CHANGE UP (ref 3.5–5)
POTASSIUM SERPL-SCNC: 4.2 MMOL/L — SIGNIFICANT CHANGE UP (ref 3.5–5)
PROT SERPL-MCNC: 4.9 G/DL — LOW (ref 6–8)
RBC # BLD: 2.96 M/UL — LOW (ref 4.7–6.1)
RBC # FLD: 13.8 % — SIGNIFICANT CHANGE UP (ref 11.5–14.5)
SODIUM SERPL-SCNC: 139 MMOL/L — SIGNIFICANT CHANGE UP (ref 135–146)
WBC # BLD: 19.91 K/UL — HIGH (ref 4.8–10.8)
WBC # FLD AUTO: 19.91 K/UL — HIGH (ref 4.8–10.8)

## 2024-10-14 PROCEDURE — 71045 X-RAY EXAM CHEST 1 VIEW: CPT | Mod: 26

## 2024-10-14 PROCEDURE — 99232 SBSQ HOSP IP/OBS MODERATE 35: CPT

## 2024-10-14 PROCEDURE — 93010 ELECTROCARDIOGRAM REPORT: CPT

## 2024-10-14 RX ORDER — PROCAINAMIDE HCL 500 MG
1000 CAPSULE ORAL ONCE
Refills: 0 | Status: COMPLETED | OUTPATIENT
Start: 2024-10-14 | End: 2024-10-14

## 2024-10-14 RX ORDER — ACETAMINOPHEN 325 MG
1000 TABLET ORAL ONCE
Refills: 0 | Status: DISCONTINUED | OUTPATIENT
Start: 2024-10-16 | End: 2024-10-16

## 2024-10-14 RX ORDER — AMIODARONE HYDROCHLORIDE 50 MG/ML
0.5 INJECTION, SOLUTION INTRAVENOUS
Qty: 450 | Refills: 0 | Status: DISCONTINUED | OUTPATIENT
Start: 2024-10-14 | End: 2024-10-16

## 2024-10-14 RX ORDER — ACETAMINOPHEN 325 MG
1000 TABLET ORAL ONCE
Refills: 0 | Status: COMPLETED | OUTPATIENT
Start: 2024-10-15 | End: 2024-10-15

## 2024-10-14 RX ORDER — ACETAMINOPHEN 325 MG
1000 TABLET ORAL ONCE
Refills: 0 | Status: COMPLETED | OUTPATIENT
Start: 2024-10-14 | End: 2024-10-14

## 2024-10-14 RX ORDER — ACETAMINOPHEN 325 MG
1000 TABLET ORAL ONCE
Refills: 0 | Status: COMPLETED | OUTPATIENT
Start: 2024-10-16 | End: 2024-10-16

## 2024-10-14 RX ORDER — MAGNESIUM SULFATE 500 MG/ML
2 VIAL (ML) INJECTION ONCE
Refills: 0 | Status: COMPLETED | OUTPATIENT
Start: 2024-10-14 | End: 2024-10-14

## 2024-10-14 RX ORDER — DILTIAZEM HCL 300 MG
10 CAPSULE, EXTENDED RELEASE 24HR ORAL ONCE
Refills: 0 | Status: COMPLETED | OUTPATIENT
Start: 2024-10-14 | End: 2024-10-14

## 2024-10-14 RX ORDER — AMIODARONE HYDROCHLORIDE 50 MG/ML
150 INJECTION, SOLUTION INTRAVENOUS ONCE
Refills: 0 | Status: COMPLETED | OUTPATIENT
Start: 2024-10-14 | End: 2024-10-14

## 2024-10-14 RX ORDER — INSULIN LISPRO 100/ML
7 VIAL (ML) SUBCUTANEOUS
Refills: 0 | Status: DISCONTINUED | OUTPATIENT
Start: 2024-10-14 | End: 2024-10-16

## 2024-10-14 RX ORDER — FUROSEMIDE 10 MG/ML
20 INJECTION INTRAVENOUS EVERY 12 HOURS
Refills: 0 | Status: DISCONTINUED | OUTPATIENT
Start: 2024-10-14 | End: 2024-10-15

## 2024-10-14 RX ADMIN — Medication 400 MILLIGRAM(S): at 19:44

## 2024-10-14 RX ADMIN — Medication 520 MILLIGRAM(S): at 04:45

## 2024-10-14 RX ADMIN — Medication 25 MILLIGRAM(S): at 06:17

## 2024-10-14 RX ADMIN — PANTOPRAZOLE SODIUM 40 MILLIGRAM(S): 40 TABLET, DELAYED RELEASE ORAL at 06:17

## 2024-10-14 RX ADMIN — AMIODARONE HYDROCHLORIDE 600 MILLIGRAM(S): 50 INJECTION, SOLUTION INTRAVENOUS at 04:30

## 2024-10-14 RX ADMIN — FUROSEMIDE 20 MILLIGRAM(S): 10 INJECTION INTRAVENOUS at 06:21

## 2024-10-14 RX ADMIN — BISACODYL 5 MILLIGRAM(S): 5 TABLET, COATED ORAL at 17:09

## 2024-10-14 RX ADMIN — AMIODARONE HYDROCHLORIDE 16.7 MG/MIN: 50 INJECTION, SOLUTION INTRAVENOUS at 20:40

## 2024-10-14 RX ADMIN — Medication 10 MILLIGRAM(S): at 04:20

## 2024-10-14 RX ADMIN — Medication 25 GRAM(S): at 11:36

## 2024-10-14 RX ADMIN — Medication 1: at 08:14

## 2024-10-14 RX ADMIN — Medication 1000 MILLIGRAM(S): at 19:56

## 2024-10-14 RX ADMIN — Medication 500 MICROGRAM(S): at 06:44

## 2024-10-14 RX ADMIN — Medication 17 GRAM(S): at 11:35

## 2024-10-14 RX ADMIN — Medication 1000 MILLIGRAM(S): at 15:00

## 2024-10-14 RX ADMIN — Medication 0.6 MILLIGRAM(S): at 17:09

## 2024-10-14 RX ADMIN — INSULIN GLARGINE 25 UNIT(S): 300 INJECTION, SOLUTION SUBCUTANEOUS at 08:15

## 2024-10-14 RX ADMIN — FUROSEMIDE 20 MILLIGRAM(S): 10 INJECTION INTRAVENOUS at 17:09

## 2024-10-14 RX ADMIN — AMIODARONE HYDROCHLORIDE 33.3 MG/MIN: 50 INJECTION, SOLUTION INTRAVENOUS at 04:54

## 2024-10-14 RX ADMIN — Medication 3 UNIT(S): at 08:14

## 2024-10-14 RX ADMIN — BISACODYL 5 MILLIGRAM(S): 5 TABLET, COATED ORAL at 06:17

## 2024-10-14 RX ADMIN — Medication 100 MILLIGRAM(S): at 11:35

## 2024-10-14 RX ADMIN — Medication 5000 UNIT(S): at 06:16

## 2024-10-14 RX ADMIN — Medication 40 MILLIEQUIVALENT(S): at 06:17

## 2024-10-14 RX ADMIN — Medication 2 TABLET(S): at 21:17

## 2024-10-14 RX ADMIN — Medication 325 MILLIGRAM(S): at 11:34

## 2024-10-14 RX ADMIN — Medication 3 UNIT(S): at 11:36

## 2024-10-14 RX ADMIN — Medication 0.4 MILLIGRAM(S): at 21:18

## 2024-10-14 RX ADMIN — Medication 25 MILLIGRAM(S): at 17:09

## 2024-10-14 RX ADMIN — Medication 2: at 11:36

## 2024-10-14 RX ADMIN — Medication 400 MILLIGRAM(S): at 14:15

## 2024-10-14 RX ADMIN — CHLORHEXIDINE GLUCONATE ORAL RINSE 1 APPLICATION(S): 1.2 SOLUTION DENTAL at 21:18

## 2024-10-14 RX ADMIN — Medication 0.6 MILLIGRAM(S): at 06:17

## 2024-10-14 RX ADMIN — AMIODARONE HYDROCHLORIDE 33.3 MG/MIN: 50 INJECTION, SOLUTION INTRAVENOUS at 19:43

## 2024-10-14 RX ADMIN — Medication 7 UNIT(S): at 17:07

## 2024-10-14 RX ADMIN — Medication 2: at 17:07

## 2024-10-14 RX ADMIN — Medication 5000 UNIT(S): at 17:10

## 2024-10-14 NOTE — PROGRESS NOTE ADULT - ASSESSMENT
Assessment/Plan:  NSTEMI/CAD-s/p CABG x 4-POD #3  1-BP control-beta-blockers  2-serum glucose control-Lantus/Lispro with sliding scale correction regimen  3-fluid overload-diuresis  4-HLD-hold statin due to transaminasemia-monitor daily  5-acute blood loss anemia-stable, monitor Hb/Hct daily  6-A-Fib, now NSR-continue amiodarone    40 minutes of critical care services provided

## 2024-10-14 NOTE — PROGRESS NOTE ADULT - SUBJECTIVE AND OBJECTIVE BOX
OPERATIVE PROCEDURE(s):                POD #  3 CABG                     84yMale  SURGEON(s): GINA Chamorro  SUBJECTIVE ASSESSMENT: incisional pain    Vital Signs Last 24 Hrs  T(F): 100.2 (13 Oct 2024 20:00), Max: 100.2 (13 Oct 2024 20:00)  HR: 123 (14 Oct 2024 07:00) (72 - 123)  BP: 120/62 (14 Oct 2024 07:00) (91/50 - 170/77)  BP(mean): 86 (14 Oct 2024 07:00) (59 - 110)  RR: 29 (14 Oct 2024 07:00) (6 - 31)  SpO2: 93% (14 Oct 2024 07:00) (91% - 100%)  I&O's Detail    13 Oct 2024 07:01  -  14 Oct 2024 07:00  --------------------------------------------------------  IN:    IV PiggyBack: 50 mL    IV PiggyBack: 350 mL    Oral Fluid: 1500 mL  Total IN: 1900 mL    OUT:    Indwelling Catheter - Urethral (mL): 1960 mL  Total OUT: 1960 mL        Net:   I&O's Detail    12 Oct 2024 07:01  -  13 Oct 2024 07:00  --------------------------------------------------------  Total NET: 2221 mL      13 Oct 2024 07:01  -  14 Oct 2024 07:00  --------------------------------------------------------  Total NET: -60 mL        CAPILLARY BLOOD GLUCOSE      POCT Blood Glucose.: 178 mg/dL (14 Oct 2024 06:55)  POCT Blood Glucose.: 151 mg/dL (13 Oct 2024 21:00)  POCT Blood Glucose.: 200 mg/dL (13 Oct 2024 16:23)  POCT Blood Glucose.: 201 mg/dL (13 Oct 2024 11:17)  POCT Blood Glucose.: 179 mg/dL (13 Oct 2024 08:39)    Physical Exam:  General: NAD; A&Ox3  Cardiac: S1/S2, RRR, no murmur, no rubs  Lungs: unlabored shallow respirations, bilateral bs decreased at bases  Abdomen: Soft/NT/protuberant  Sternum: Intact, no click, incision healing well with no drainage  Incisions: Incisions clean/dry/intact  Extremities: No edema b/l lower extremities    Central Venous Catheter: Yes[]  critical patient   Jernigan Catheter: Yes  [] , critical patient strict I&O  EPICARDIAL WIRES:  [] YES  BOWEL MOVEMENT:  [] YES [] NO,         LABS:                        9.2[L]  19.91[H] )-----------( 220      ( 14 Oct 2024 03:07 )             28.3[L]                        9.9[L]  19.25[H] )-----------( 192      ( 13 Oct 2024 02:00 )             30.2[L]    10-14    139  |  104  |  38[H]  ----------------------------<  142[H]  4.2   |  24  |  1.3  10-13    141  |  107  |  32[H]  ----------------------------<  108[H]  4.3   |  23  |  1.2    Ca    8.5      14 Oct 2024 03:07  Mg     2.0     10-14    TPro  4.9[L] [6.0 - 8.0]  /  Alb  3.1[L] [3.5 - 5.2]  /  TBili  0.7 [0.2 - 1.2]  /  DBili  x   /  AST  24 [0 - 41]  /  ALT  32 [0 - 41]  /  AlkPhos  81 [30 - 115]  10-14      RADIOLOGY & ADDITIONAL TESTS:  CXR:  EKG:  MEDICATIONS  (STANDING):  allopurinol 100 milliGRAM(s) Oral daily  aMIOdarone Infusion 1 mG/Min (33.3 mL/Hr) IV Continuous <Continuous>  aspirin enteric coated 325 milliGRAM(s) Oral daily  bisacodyl 5 milliGRAM(s) Oral every 12 hours  chlorhexidine 2% Cloths 1 Application(s) Topical daily  colchicine 0.6 milliGRAM(s) Oral two times a day  dextrose 5%. 1000 milliLiter(s) (100 mL/Hr) IV Continuous <Continuous>  dextrose 5%. 1000 milliLiter(s) (50 mL/Hr) IV Continuous <Continuous>  dextrose 50% Injectable 50 milliLiter(s) IV Push every 15 minutes  furosemide   Injectable 20 milliGRAM(s) IV Push every 12 hours  glucagon  Injectable 1 milliGRAM(s) IntraMuscular once  heparin   Injectable 5000 Unit(s) SubCutaneous every 12 hours  insulin glargine Injectable (LANTUS) 25 Unit(s) SubCutaneous every morning  insulin lispro (ADMELOG) corrective regimen sliding scale   SubCutaneous three times a day before meals  insulin lispro (ADMELOG) corrective regimen sliding scale   SubCutaneous at bedtime  insulin lispro Injectable (ADMELOG) 3 Unit(s) SubCutaneous three times a day before meals  insulin regular Infusion 1 Unit(s)/Hr (1 mL/Hr) IV Continuous <Continuous>  ipratropium    for Nebulization 500 MICROGram(s) Nebulizer every 6 hours  metoprolol tartrate 25 milliGRAM(s) Oral two times a day  ondansetron Injectable 4 milliGRAM(s) IV Push once  pantoprazole    Tablet 40 milliGRAM(s) Oral before breakfast  polyethylene glycol 3350 17 Gram(s) Oral daily  potassium chloride    Tablet ER 40 milliEquivalent(s) Oral daily  senna 2 Tablet(s) Oral at bedtime  sodium chloride 0.9%. 1000 milliLiter(s) (20 mL/Hr) IV Continuous <Continuous>  tamsulosin 0.4 milliGRAM(s) Oral at bedtime    MEDICATIONS  (PRN):  dextrose Oral Gel 15 Gram(s) Oral once PRN Blood Glucose LESS THAN 70 milliGRAM(s)/deciliter  melatonin 5 milliGRAM(s) Oral at bedtime PRN Insomnia  oxyCODONE    IR 10 milliGRAM(s) Oral every 4 hours PRN Severe Pain (7 - 10)  oxyCODONE    IR 5 milliGRAM(s) Oral every 4 hours PRN Moderate Pain (4 - 6)    Allergies    No Known Allergies    Intolerances      Ambulation/Activity Status: ambulate     Assessment/Plan:  84y Male status-post CABGx4 POD#3  - Case and plan discussed with CTU Intensivist and CT Surgeon - Dr. Portillo/Pedro Pablo/Bobbi  - Continue CTU supportive care    - Continue DVT/GI prophylaxis  - Incentive Spirometry 10 times an hour  - Continue to advance physical activity as tolerated and continue PT/OT as directed  1. CAD: Continue ASA, statin, BB, pain control prn, d/c sump, cts, and swan  2. HTN: start low dose metoprolol, cont to monitor, start norvasc   3. A. Fib prophylaxis: bb, cont to monitor electrolytes   4. Post-op Hypoxia: cont nebs, wean o2 as tolerated, encourage incentive spirometry   5. DM/Glucose Control: pt has HAGMA/DKA over night- closed anion gap now was on insulin gtt, start lantus 25 and humalog 3 with sliding scale   6. pericarditis- start colchicine     Social Service Disposition:  pt to assess   OPERATIVE PROCEDURE(s):                POD #  3 CABG                     84yMale  SURGEON(s): GINA Chamorro  SUBJECTIVE ASSESSMENT: incisional pain    Vital Signs Last 24 Hrs  T(F): 100.2 (13 Oct 2024 20:00), Max: 100.2 (13 Oct 2024 20:00)  HR: 123 (14 Oct 2024 07:00) (72 - 123)  BP: 120/62 (14 Oct 2024 07:00) (91/50 - 170/77)  BP(mean): 86 (14 Oct 2024 07:00) (59 - 110)  RR: 29 (14 Oct 2024 07:00) (6 - 31)  SpO2: 93% (14 Oct 2024 07:00) (91% - 100%)  I&O's Detail    13 Oct 2024 07:01  -  14 Oct 2024 07:00  --------------------------------------------------------  IN:    IV PiggyBack: 50 mL    IV PiggyBack: 350 mL    Oral Fluid: 1500 mL  Total IN: 1900 mL    OUT:    Indwelling Catheter - Urethral (mL): 1960 mL  Total OUT: 1960 mL        Net:   I&O's Detail    12 Oct 2024 07:01  -  13 Oct 2024 07:00  --------------------------------------------------------  Total NET: 2221 mL      13 Oct 2024 07:01  -  14 Oct 2024 07:00  --------------------------------------------------------  Total NET: -60 mL        CAPILLARY BLOOD GLUCOSE      POCT Blood Glucose.: 178 mg/dL (14 Oct 2024 06:55)  POCT Blood Glucose.: 151 mg/dL (13 Oct 2024 21:00)  POCT Blood Glucose.: 200 mg/dL (13 Oct 2024 16:23)  POCT Blood Glucose.: 201 mg/dL (13 Oct 2024 11:17)  POCT Blood Glucose.: 179 mg/dL (13 Oct 2024 08:39)    Physical Exam:  General: NAD; A&Ox3  Cardiac: S1/S2, RRR, no murmur, no rubs  Lungs: unlabored shallow respirations, bilateral bs decreased at bases, L>R  Abdomen: Soft/NT/protuberant  Sternum: Intact, no click, incision healing well with no drainage  Incisions: Incisions clean/dry/intact, right leg clean and dry  Extremities: No edema b/l lower extremities    Central Venous Catheter: Yes[]  critical patient   Garcia Yes, critical care  EPICARDIAL WIRES:  [] YES  BOWEL MOVEMENT:   NO,         LABS:                        9.2[L]  19.91[H] )-----------( 220      ( 14 Oct 2024 03:07 )             28.3[L]                        9.9[L]  19.25[H] )-----------( 192      ( 13 Oct 2024 02:00 )             30.2[L]    10-14    139  |  104  |  38[H]  ----------------------------<  142[H]  4.2   |  24  |  1.3  10-13    141  |  107  |  32[H]  ----------------------------<  108[H]  4.3   |  23  |  1.2    Ca    8.5      14 Oct 2024 03:07  Mg     2.0     10-14    TPro  4.9[L] [6.0 - 8.0]  /  Alb  3.1[L] [3.5 - 5.2]  /  TBili  0.7 [0.2 - 1.2]  /  DBili  x   /  AST  24 [0 - 41]  /  ALT  32 [0 - 41]  /  AlkPhos  81 [30 - 115]  10-14    RADIOLOGY & ADDITIONAL TESTS:  CXR: < from: Xray Chest 1 View- PORTABLE-Routine (10.14.24 @ 05:38) >  Right opacity/pleural effusion, decreased. Redemonstration left   opacity/pleural effusion and cardiomegaly.    < end of copied text >  < from: 12 Lead ECG (10.13.24 @ 07:05) >  Ventricular Rate 75 BPM    Atrial Rate 75 BPM    P-R Interval 156 ms    QRS Duration 88 ms    Q-T Interval 396 ms    QTC Calculation(Bazett) 442 ms    P Axis 45 degrees    R Axis 5 degrees    T Axis 14 degrees    Diagnosis Line Normal sinus rhythm  Inferior infarct , possibly acute  Anterior injury pattern  ** ** ACUTE MI / STEMI ** **    MEDICATIONS  (STANDING):  allopurinol 100 milliGRAM(s) Oral daily  aMIOdarone Infusion 1 mG/Min (33.3 mL/Hr) IV Continuous <Continuous>  aspirin enteric coated 325 milliGRAM(s) Oral daily  bisacodyl 5 milliGRAM(s) Oral every 12 hours  chlorhexidine 2% Cloths 1 Application(s) Topical daily  colchicine 0.6 milliGRAM(s) Oral two times a day  dextrose 5%. 1000 milliLiter(s) (100 mL/Hr) IV Continuous <Continuous>  dextrose 5%. 1000 milliLiter(s) (50 mL/Hr) IV Continuous <Continuous>  dextrose 50% Injectable 50 milliLiter(s) IV Push every 15 minutes  furosemide   Injectable 20 milliGRAM(s) IV Push every 12 hours  glucagon  Injectable 1 milliGRAM(s) IntraMuscular once  heparin   Injectable 5000 Unit(s) SubCutaneous every 12 hours  insulin glargine Injectable (LANTUS) 25 Unit(s) SubCutaneous every morning  insulin lispro (ADMELOG) corrective regimen sliding scale   SubCutaneous three times a day before meals  insulin lispro (ADMELOG) corrective regimen sliding scale   SubCutaneous at bedtime  insulin lispro Injectable (ADMELOG) 3 Unit(s) SubCutaneous three times a day before meals  insulin regular Infusion 1 Unit(s)/Hr (1 mL/Hr) IV Continuous <Continuous>  ipratropium    for Nebulization 500 MICROGram(s) Nebulizer every 6 hours  metoprolol tartrate 25 milliGRAM(s) Oral two times a day  ondansetron Injectable 4 milliGRAM(s) IV Push once  pantoprazole    Tablet 40 milliGRAM(s) Oral before breakfast  polyethylene glycol 3350 17 Gram(s) Oral daily  potassium chloride    Tablet ER 40 milliEquivalent(s) Oral daily  senna 2 Tablet(s) Oral at bedtime  sodium chloride 0.9%. 1000 milliLiter(s) (20 mL/Hr) IV Continuous <Continuous>  tamsulosin 0.4 milliGRAM(s) Oral at bedtime    MEDICATIONS  (PRN):  dextrose Oral Gel 15 Gram(s) Oral once PRN Blood Glucose LESS THAN 70 milliGRAM(s)/deciliter  melatonin 5 milliGRAM(s) Oral at bedtime PRN Insomnia  oxyCODONE    IR 10 milliGRAM(s) Oral every 4 hours PRN Severe Pain (7 - 10)  oxyCODONE    IR 5 milliGRAM(s) Oral every 4 hours PRN Moderate Pain (4 - 6)    Allergies    No Known Allergies    Intolerances      Ambulation/Activity Status: ambulate     Assessment/Plan:  84y Male status-post CABGx4 POD#3  - Case and plan discussed with CTU Intensivist and CT Surgeon - Dr. Portillo/Pedro Pablo/Bobbi  - Continue CTU supportive care    - Continue DVT/GI prophylaxis  - Incentive Spirometry 10 times an hour  - Continue to advance physical activity as tolerated and continue PT/OT as directed  1. CAD: Continue ASA, statin, BB, pain control prn, will need Plavix prior to discharge (after wires out)  2. HTN:  metoprolol, cont to monitor  3. Ne onset A. Fib this AM - new onset - amio gtt started, also given digoxin, and procainamide, monitor electrolytes   4. Post-op Hypoxia: cont nebs, wean o2 as tolerated, encourage incentive spirometry   5. DM/Glucose Control: resolved HAGMA/DKA - closed anion gap, off insulin gtt, on lantus 25 and humalog 3 with sliding scale   6. pericarditis- started colchicine   7. remove garcia  8. hold AM heparin for possible wire removal    Social Service Disposition:  pt to assess

## 2024-10-14 NOTE — PROGRESS NOTE ADULT - SUBJECTIVE AND OBJECTIVE BOX
ALEX GROVER  MRN#: [id number]  Subjective:  Patient was seen and evalauted on AM rounds offerring no specific compliants at this time.    OBJECTIVE:  ICU Vital Signs Last 24 Hrs  T(C): 37.6 (14 Oct 2024 12:00), Max: 37.9 (13 Oct 2024 20:00)  T(F): 99.7 (14 Oct 2024 12:00), Max: 100.2 (13 Oct 2024 20:00)  HR: 68 (14 Oct 2024 12:00) (68 - 123)  BP: 106/62 (14 Oct 2024 11:00) (91/50 - 141/69)  BP(mean): 80 (14 Oct 2024 11:00) (59 - 98)  ABP: --  ABP(mean): --  RR: 24 (14 Oct 2024 12:00) (6 - 29)  SpO2: 97% (14 Oct 2024 12:00) (91% - 98%)    O2 Parameters below as of 14 Oct 2024 12:00  Patient On (Oxygen Delivery Method): nasal cannula  O2 Flow (L/min): 6          10-13 @ 07:  -  10-14 @ 07:00  --------------------------------------------------------  IN: 1966.6 mL / OUT: 1960 mL / NET: 6.6 mL    10-14 @ :01  -  10-14 @ 12:19  --------------------------------------------------------  IN: 596.8 mL / OUT: 500 mL / NET: 96.8 mL      CAPILLARY BLOOD GLUCOSE      POCT Blood Glucose.: 215 mg/dL (14 Oct 2024 11:30)      PHYSICAL EXAM:Daily     Daily Weight in k.2 (14 Oct 2024 07:00)  General: WN/WD NAD    HEENT:     + NCAT  + EOMI  - Conjuctival edema   - Icterus   - Thrush   - ETT  - NGT/OGT    Neck:         + FROM    - JVD     - Nodes     - Masses    + Mid-line trachea   - Tracheostomy    Chest:         - Sternal click  - Sternal drainage  + Pacing wires  - Chest tubes  - SubQ emphysema    Lungs:          + CTA   - Rhonchi    - Rales    - Wheezing     - Decreased BS   - Dullness R L    Cardiac:       + S1 + S2    + RRR   - Irregular   - S3  - S4    - Murmurs   - Rub   - Hamman’s sign     Abdomen:    + BS     + Soft    + Non-tender     - Distended    - Organomegaly  - PEG    Extremities:   - Cyanosis U/L   - Clubbing  U/L  - LE/UE Edema   + Capillary refill    + Pulses     Neuro:        + Awake   +  Alert   - Confused   - Lethargic   - Sedated   - Generalized Weakness    Skin:        - Rashes    - Erythema   + Normal incisions   + IV sites intact  - Sacral decubitus    HOSPITAL MEDICATIONS:  MEDICATIONS  (STANDING):  allopurinol 100 milliGRAM(s) Oral daily  aMIOdarone Infusion 1 mG/Min (33.3 mL/Hr) IV Continuous <Continuous>  aspirin enteric coated 325 milliGRAM(s) Oral daily  bisacodyl 5 milliGRAM(s) Oral every 12 hours  chlorhexidine 2% Cloths 1 Application(s) Topical daily  colchicine 0.6 milliGRAM(s) Oral two times a day  dextrose 5%. 1000 milliLiter(s) (100 mL/Hr) IV Continuous <Continuous>  dextrose 5%. 1000 milliLiter(s) (50 mL/Hr) IV Continuous <Continuous>  dextrose 50% Injectable 50 milliLiter(s) IV Push every 15 minutes  furosemide   Injectable 20 milliGRAM(s) IV Push every 12 hours  glucagon  Injectable 1 milliGRAM(s) IntraMuscular once  heparin   Injectable 5000 Unit(s) SubCutaneous every 12 hours  insulin glargine Injectable (LANTUS) 25 Unit(s) SubCutaneous every morning  insulin lispro (ADMELOG) corrective regimen sliding scale   SubCutaneous three times a day before meals  insulin lispro (ADMELOG) corrective regimen sliding scale   SubCutaneous at bedtime  insulin lispro Injectable (ADMELOG) 3 Unit(s) SubCutaneous three times a day before meals  insulin regular Infusion 1 Unit(s)/Hr (1 mL/Hr) IV Continuous <Continuous>  ipratropium    for Nebulization 500 MICROGram(s) Nebulizer every 6 hours  metoprolol tartrate 25 milliGRAM(s) Oral two times a day  ondansetron Injectable 4 milliGRAM(s) IV Push once  pantoprazole    Tablet 40 milliGRAM(s) Oral before breakfast  polyethylene glycol 3350 17 Gram(s) Oral daily  potassium chloride    Tablet ER 40 milliEquivalent(s) Oral daily  senna 2 Tablet(s) Oral at bedtime  sodium chloride 0.9%. 1000 milliLiter(s) (20 mL/Hr) IV Continuous <Continuous>  tamsulosin 0.4 milliGRAM(s) Oral at bedtime    MEDICATIONS  (PRN):  dextrose Oral Gel 15 Gram(s) Oral once PRN Blood Glucose LESS THAN 70 milliGRAM(s)/deciliter  melatonin 5 milliGRAM(s) Oral at bedtime PRN Insomnia  oxyCODONE    IR 10 milliGRAM(s) Oral every 4 hours PRN Severe Pain (7 - 10)  oxyCODONE    IR 5 milliGRAM(s) Oral every 4 hours PRN Moderate Pain (4 - 6)      LABS:                        9.2    19.91 )-----------( 220      ( 14 Oct 2024 03:07 )             28.3    10    139  |  104  |  38[H]  ----------------------------<  142[H]  4.2   |  24  |  1.3    Ca    8.5      14 Oct 2024 03:07  Mg     2.0     10-14    TPro  4.9[L]  /  Alb  3.1[L]  /  TBili  0.7  /  DBili  x   /  AST  24  /  ALT  32  /  AlkPhos  81  10-     LIVER FUNCTIONS - ( 14 Oct 2024 03:07 )  Alb: 3.1 g/dL / Pro: 4.9 g/dL / ALK PHOS: 81 U/L / ALT: 32 U/L / AST: 24 U/L / GGT: x           Urinalysis Basic - ( 14 Oct 2024 03:07 )    Color: x / Appearance: x / SG: x / pH: x  Gluc: 142 mg/dL / Ketone: x  / Bili: x / Urobili: x   Blood: x / Protein: x / Nitrite: x   Leuk Esterase: x / RBC: x / WBC x   Sq Epi: x / Non Sq Epi: x / Bacteria: x        RADIOLOGY:  X Reviewed and interpreted by me: bilateral opacities/effusions    CARDIOPULMONARY DYSFUNCTION  - Respiratory status required supplemental oxygen & the following of continuous pulse oximetry for support & to prevent decompensation  - Continued early mobilization as tolerated  - Addressed analgesic regimen to optimize function    PREVENTION-PROPHYLAXIS  - ASA continued for graft occlusion-thromboembolism prophylaxis  - Lipitor/Zocor was also started for long term graft patency  - Lovenox/Heparin initiated/continued for VTE prophylaxis in addition to Venodyne boots  - Protonix/Pepcid maintained for GI bleeding prophylaxis  - Lopressor initiated/continued for atrial fibrillation prophylaxis  - Metabolic stability & infection prophylaxis required review and adjustment of regular Insulin sliding scale and gylcemic regimen while following serial glucose levels to help achieve & maintain euglycemia  - Reviewed & addressed surgical site infection prophylaxis regimen

## 2024-10-15 LAB
ALBUMIN SERPL ELPH-MCNC: 2.8 G/DL — LOW (ref 3.5–5.2)
ALP SERPL-CCNC: 91 U/L — SIGNIFICANT CHANGE UP (ref 30–115)
ALT FLD-CCNC: 52 U/L — HIGH (ref 0–41)
ANION GAP SERPL CALC-SCNC: 9 MMOL/L — SIGNIFICANT CHANGE UP (ref 7–14)
AST SERPL-CCNC: 62 U/L — HIGH (ref 0–41)
BASOPHILS # BLD AUTO: 0.02 K/UL — SIGNIFICANT CHANGE UP (ref 0–0.2)
BASOPHILS NFR BLD AUTO: 0.1 % — SIGNIFICANT CHANGE UP (ref 0–1)
BILIRUB SERPL-MCNC: 0.5 MG/DL — SIGNIFICANT CHANGE UP (ref 0.2–1.2)
BUN SERPL-MCNC: 46 MG/DL — HIGH (ref 10–20)
CALCIUM SERPL-MCNC: 8.4 MG/DL — SIGNIFICANT CHANGE UP (ref 8.4–10.5)
CHLORIDE SERPL-SCNC: 103 MMOL/L — SIGNIFICANT CHANGE UP (ref 98–110)
CO2 SERPL-SCNC: 25 MMOL/L — SIGNIFICANT CHANGE UP (ref 17–32)
CREAT SERPL-MCNC: 1.7 MG/DL — HIGH (ref 0.7–1.5)
EGFR: 40 ML/MIN/1.73M2 — LOW
EOSINOPHIL # BLD AUTO: 0.11 K/UL — SIGNIFICANT CHANGE UP (ref 0–0.7)
EOSINOPHIL NFR BLD AUTO: 0.7 % — SIGNIFICANT CHANGE UP (ref 0–8)
GLUCOSE BLDC GLUCOMTR-MCNC: 108 MG/DL — HIGH (ref 70–99)
GLUCOSE BLDC GLUCOMTR-MCNC: 109 MG/DL — HIGH (ref 70–99)
GLUCOSE BLDC GLUCOMTR-MCNC: 186 MG/DL — HIGH (ref 70–99)
GLUCOSE BLDC GLUCOMTR-MCNC: 93 MG/DL — SIGNIFICANT CHANGE UP (ref 70–99)
GLUCOSE SERPL-MCNC: 113 MG/DL — HIGH (ref 70–99)
HCT VFR BLD CALC: 26.2 % — LOW (ref 42–52)
HGB BLD-MCNC: 8.5 G/DL — LOW (ref 14–18)
IMM GRANULOCYTES NFR BLD AUTO: 0.7 % — HIGH (ref 0.1–0.3)
LYMPHOCYTES # BLD AUTO: 1.23 K/UL — SIGNIFICANT CHANGE UP (ref 1.2–3.4)
LYMPHOCYTES # BLD AUTO: 7.9 % — LOW (ref 20.5–51.1)
MAGNESIUM SERPL-MCNC: 2.4 MG/DL — SIGNIFICANT CHANGE UP (ref 1.8–2.4)
MCHC RBC-ENTMCNC: 30.8 PG — SIGNIFICANT CHANGE UP (ref 27–31)
MCHC RBC-ENTMCNC: 32.4 G/DL — SIGNIFICANT CHANGE UP (ref 32–37)
MCV RBC AUTO: 94.9 FL — HIGH (ref 80–94)
MONOCYTES # BLD AUTO: 0.97 K/UL — HIGH (ref 0.1–0.6)
MONOCYTES NFR BLD AUTO: 6.2 % — SIGNIFICANT CHANGE UP (ref 1.7–9.3)
NEUTROPHILS # BLD AUTO: 13.12 K/UL — HIGH (ref 1.4–6.5)
NEUTROPHILS NFR BLD AUTO: 84.4 % — HIGH (ref 42.2–75.2)
NRBC # BLD: 0 /100 WBCS — SIGNIFICANT CHANGE UP (ref 0–0)
PLATELET # BLD AUTO: 252 K/UL — SIGNIFICANT CHANGE UP (ref 130–400)
PMV BLD: 10.5 FL — HIGH (ref 7.4–10.4)
POTASSIUM SERPL-MCNC: 4 MMOL/L — SIGNIFICANT CHANGE UP (ref 3.5–5)
POTASSIUM SERPL-SCNC: 4 MMOL/L — SIGNIFICANT CHANGE UP (ref 3.5–5)
PROT SERPL-MCNC: 4.8 G/DL — LOW (ref 6–8)
RBC # BLD: 2.76 M/UL — LOW (ref 4.7–6.1)
RBC # FLD: 13.7 % — SIGNIFICANT CHANGE UP (ref 11.5–14.5)
SODIUM SERPL-SCNC: 137 MMOL/L — SIGNIFICANT CHANGE UP (ref 135–146)
WBC # BLD: 15.56 K/UL — HIGH (ref 4.8–10.8)
WBC # FLD AUTO: 15.56 K/UL — HIGH (ref 4.8–10.8)

## 2024-10-15 PROCEDURE — 93010 ELECTROCARDIOGRAM REPORT: CPT

## 2024-10-15 PROCEDURE — 99232 SBSQ HOSP IP/OBS MODERATE 35: CPT

## 2024-10-15 PROCEDURE — 71045 X-RAY EXAM CHEST 1 VIEW: CPT | Mod: 26

## 2024-10-15 RX ORDER — FOLIC ACID 1 MG/1
1 TABLET ORAL DAILY
Refills: 0 | Status: DISCONTINUED | OUTPATIENT
Start: 2024-10-15 | End: 2024-10-16

## 2024-10-15 RX ORDER — FERROUS SULFATE 325(65) MG
325 TABLET ORAL DAILY
Refills: 0 | Status: DISCONTINUED | OUTPATIENT
Start: 2024-10-15 | End: 2024-10-16

## 2024-10-15 RX ORDER — AMIODARONE HYDROCHLORIDE 50 MG/ML
200 INJECTION, SOLUTION INTRAVENOUS DAILY
Refills: 0 | Status: DISCONTINUED | OUTPATIENT
Start: 2024-10-15 | End: 2024-10-16

## 2024-10-15 RX ADMIN — Medication 400 MILLIGRAM(S): at 13:37

## 2024-10-15 RX ADMIN — Medication 2 TABLET(S): at 22:07

## 2024-10-15 RX ADMIN — Medication 1000 MILLIGRAM(S): at 14:44

## 2024-10-15 RX ADMIN — Medication 1000 MILLIGRAM(S): at 02:46

## 2024-10-15 RX ADMIN — BISACODYL 5 MILLIGRAM(S): 5 TABLET, COATED ORAL at 05:06

## 2024-10-15 RX ADMIN — Medication 325 MILLIGRAM(S): at 11:46

## 2024-10-15 RX ADMIN — Medication 1000 MILLIGRAM(S): at 22:07

## 2024-10-15 RX ADMIN — Medication 1: at 11:45

## 2024-10-15 RX ADMIN — Medication 400 MILLIGRAM(S): at 22:06

## 2024-10-15 RX ADMIN — AMIODARONE HYDROCHLORIDE 200 MILLIGRAM(S): 50 INJECTION, SOLUTION INTRAVENOUS at 09:17

## 2024-10-15 RX ADMIN — FUROSEMIDE 20 MILLIGRAM(S): 10 INJECTION INTRAVENOUS at 05:08

## 2024-10-15 RX ADMIN — Medication 25 MILLIGRAM(S): at 05:06

## 2024-10-15 RX ADMIN — Medication 400 MILLIGRAM(S): at 01:00

## 2024-10-15 RX ADMIN — Medication 100 MILLIGRAM(S): at 11:46

## 2024-10-15 RX ADMIN — INSULIN GLARGINE 25 UNIT(S): 300 INJECTION, SOLUTION SUBCUTANEOUS at 07:30

## 2024-10-15 RX ADMIN — Medication 400 MILLIGRAM(S): at 07:50

## 2024-10-15 RX ADMIN — Medication 0.4 MILLIGRAM(S): at 22:06

## 2024-10-15 RX ADMIN — Medication 0.6 MILLIGRAM(S): at 05:06

## 2024-10-15 RX ADMIN — Medication 5000 UNIT(S): at 17:11

## 2024-10-15 RX ADMIN — Medication 1000 MILLIGRAM(S): at 09:00

## 2024-10-15 RX ADMIN — Medication 40 MILLIEQUIVALENT(S): at 11:46

## 2024-10-15 RX ADMIN — Medication 0.6 MILLIGRAM(S): at 17:11

## 2024-10-15 RX ADMIN — Medication 7 UNIT(S): at 11:47

## 2024-10-15 RX ADMIN — FOLIC ACID 1 MILLIGRAM(S): 1 TABLET ORAL at 11:46

## 2024-10-15 RX ADMIN — Medication 25 MILLIGRAM(S): at 17:11

## 2024-10-15 RX ADMIN — Medication 7 UNIT(S): at 07:30

## 2024-10-15 RX ADMIN — CHLORHEXIDINE GLUCONATE ORAL RINSE 1 APPLICATION(S): 1.2 SOLUTION DENTAL at 22:07

## 2024-10-15 RX ADMIN — Medication 17 GRAM(S): at 11:46

## 2024-10-15 RX ADMIN — PANTOPRAZOLE SODIUM 40 MILLIGRAM(S): 40 TABLET, DELAYED RELEASE ORAL at 05:06

## 2024-10-15 NOTE — PROGRESS NOTE ADULT - SUBJECTIVE AND OBJECTIVE BOX
OPERATIVE PROCEDURE(s):                POD #                       84yMale  SURGEON(s): GINA Chamorro  SUBJECTIVE ASSESSMENT:   Vital Signs Last 24 Hrs  T(F): 96.2 (15 Oct 2024 04:00), Max: 100.6 (14 Oct 2024 13:00)  HR: 65 (15 Oct 2024 07:00) (52 - 110)  BP: 106/56 (15 Oct 2024 07:00) (97/61 - 131/61)  BP(mean): 76 (15 Oct 2024 07:00) (71 - 91)  ABP: --  ABP(mean): --  RR: 28 (15 Oct 2024 07:00) (11 - 30)  SpO2: 96% (15 Oct 2024 07:00) (93% - 100%)  CVP(mm Hg): --  CVP(cm H2O): --  CO: --  CI: --  PA: --  SVR: --    I&O's Detail    14 Oct 2024 07:01  -  15 Oct 2024 07:00  --------------------------------------------------------  IN:    Amiodarone: 366.3 mL    IV PiggyBack: 150 mL    Oral Fluid: 597 mL  Total IN: 1113.3 mL    OUT:    Indwelling Catheter - Urethral (mL): 675 mL    Voided (mL): 350 mL  Total OUT: 1025 mL        Net:   I&O's Detail    13 Oct 2024 07:01  -  14 Oct 2024 07:00  --------------------------------------------------------  Total NET: 6.6 mL      14 Oct 2024 07:01  -  15 Oct 2024 07:00  --------------------------------------------------------  Total NET: 88.3 mL        CAPILLARY BLOOD GLUCOSE      POCT Blood Glucose.: 109 mg/dL (15 Oct 2024 06:42)  POCT Blood Glucose.: 176 mg/dL (14 Oct 2024 21:15)  POCT Blood Glucose.: 202 mg/dL (14 Oct 2024 17:00)  POCT Blood Glucose.: 215 mg/dL (14 Oct 2024 11:30)  POCT Blood Glucose.: 190 mg/dL (14 Oct 2024 08:11)    Physical Exam:  General: NAD; A&Ox3/Patient is intubated and sedated  Cardiac: S1/S2, RRR, no murmur, no rubs  Lungs: unlabored respirations, CTA b/l, no wheeze, no rales, no crackles  Abdomen: Soft/NT/ND; positive bowel sounds x 4  Sternum: Intact, no click, incision healing well with no drainage  Incisions: Incisions clean/dry/intact  Extremities: No edema b/l lower extremities; good capillary refill; no cyanosis; palpable 1+ pedal pulses b/l    Central Venous Catheter: Yes[]  No[] , If Yes indication:           Day #  Jernigan Catheter: Yes  [] , No  [] , If yes indication:                      Day #  NGT: Yes [] No [] ,    If Yes Placement:                                     Day #  EPICARDIAL WIRES:  [] YES [] NO                                              Day #  BOWEL MOVEMENT:  [] YES [] NO, If No, Timing since last BM:      Day #  CHEST TUBE(Left/Right):  [] YES [] NO, If yes -  AIR LEAKS:  [] YES [] NO        LABS:                        8.5[L]  15.56[H] )-----------( 252      ( 15 Oct 2024 02:00 )             26.2[L]                        9.2[L]  19.91[H] )-----------( 220      ( 14 Oct 2024 03:07 )             28.3[L]    10-15    137  |  103  |  46[H]  ----------------------------<  113[H]  4.0   |  25  |  1.7[H]  10-14    139  |  104  |  38[H]  ----------------------------<  142[H]  4.2   |  24  |  1.3    Ca    8.4      15 Oct 2024 02:00  Mg     2.4     10-15    TPro  4.8[L] [6.0 - 8.0]  /  Alb  2.8[L] [3.5 - 5.2]  /  TBili  0.5 [0.2 - 1.2]  /  DBili  x   /  AST  62[H] [0 - 41]  /  ALT  52[H] [0 - 41]  /  AlkPhos  91 [30 - 115]  10-15      Urinalysis Basic - ( 15 Oct 2024 02:00 )    Color: x / Appearance: x / SG: x / pH: x  Gluc: 113 mg/dL / Ketone: x  / Bili: x / Urobili: x   Blood: x / Protein: x / Nitrite: x   Leuk Esterase: x / RBC: x / WBC x   Sq Epi: x / Non Sq Epi: x / Bacteria: x        RADIOLOGY & ADDITIONAL TESTS:  CXR:  EKG:  MEDICATIONS  (STANDING):  acetaminophen   IVPB .. 1000 milliGRAM(s) IV Intermittent once  acetaminophen   IVPB .. 1000 milliGRAM(s) IV Intermittent once  acetaminophen   IVPB .. 1000 milliGRAM(s) IV Intermittent once  allopurinol 100 milliGRAM(s) Oral daily  aMIOdarone Infusion 0.5 mG/Min (16.7 mL/Hr) IV Continuous <Continuous>  aspirin enteric coated 325 milliGRAM(s) Oral daily  bisacodyl 5 milliGRAM(s) Oral every 12 hours  chlorhexidine 2% Cloths 1 Application(s) Topical daily  colchicine 0.6 milliGRAM(s) Oral two times a day  dextrose 5%. 1000 milliLiter(s) (100 mL/Hr) IV Continuous <Continuous>  dextrose 5%. 1000 milliLiter(s) (50 mL/Hr) IV Continuous <Continuous>  dextrose 50% Injectable 50 milliLiter(s) IV Push every 15 minutes  furosemide   Injectable 20 milliGRAM(s) IV Push every 12 hours  glucagon  Injectable 1 milliGRAM(s) IntraMuscular once  heparin   Injectable 5000 Unit(s) SubCutaneous every 12 hours  insulin glargine Injectable (LANTUS) 25 Unit(s) SubCutaneous every morning  insulin lispro (ADMELOG) corrective regimen sliding scale   SubCutaneous three times a day before meals  insulin lispro (ADMELOG) corrective regimen sliding scale   SubCutaneous at bedtime  insulin lispro Injectable (ADMELOG) 7 Unit(s) SubCutaneous three times a day before meals  insulin regular Infusion 1 Unit(s)/Hr (1 mL/Hr) IV Continuous <Continuous>  ipratropium    for Nebulization 500 MICROGram(s) Nebulizer every 6 hours  metoprolol tartrate 25 milliGRAM(s) Oral two times a day  ondansetron Injectable 4 milliGRAM(s) IV Push once  pantoprazole    Tablet 40 milliGRAM(s) Oral before breakfast  polyethylene glycol 3350 17 Gram(s) Oral daily  potassium chloride    Tablet ER 40 milliEquivalent(s) Oral daily  senna 2 Tablet(s) Oral at bedtime  sodium chloride 0.9%. 1000 milliLiter(s) (20 mL/Hr) IV Continuous <Continuous>  tamsulosin 0.4 milliGRAM(s) Oral at bedtime    MEDICATIONS  (PRN):  dextrose Oral Gel 15 Gram(s) Oral once PRN Blood Glucose LESS THAN 70 milliGRAM(s)/deciliter  melatonin 5 milliGRAM(s) Oral at bedtime PRN Insomnia  oxyCODONE    IR 10 milliGRAM(s) Oral every 4 hours PRN Severe Pain (7 - 10)  oxyCODONE    IR 5 milliGRAM(s) Oral every 4 hours PRN Moderate Pain (4 - 6)    HEPARIN:  [] YES [] NO  Dose: XX UNITS/HR UNITS Q8H  LOVENOX:[] YES [] NO  Dose: XX mg Q24H  COUMADIN: []  YES [] NO  Dose: XX mg  Q24H  SCD's: YES b/l  GI Prophylaxis: Protonix [], Pepcid [], None [], (Contra-indication:.....)    Post-Op Beta-Blockers: Yes [], No[], If No, then contraindication:  Post-Op Aspirin: Yes [],  No [], If No, then contraindication:  Post-Op Statin: Yes [], No[], If No, then contraindication:  Allergies    No Known Allergies    Intolerances      Ambulation/Activity Status:    Assessment/Plan:  84y Male status-post .....  - Case and plan discussed with CTU Intensivist and CT Surgeon - Dr. Portillo/Pedro Pablo/Bobbi  - Continue CTU supportive care    - Continue DVT/GI prophylaxis  - Incentive Spirometry 10 times an hour  - Continue to advance physical activity as tolerated and continue PT/OT as directed  1. CAD: Continue ASA, statin, BB  2. HTN:   3. A. Fib:   4. COPD/Hypoxia:   5. DM/Glucose Control:     Social Service Disposition:     OPERATIVE PROCEDURE(s):      CABGx4                  POD #  4                   84yMale  SURGEON(s): GINA Chamorro  SUBJECTIVE ASSESSMENT: pt seen and examined. no acute complaints at this time.   Vital Signs Last 24 Hrs  T(F): 96.2 (15 Oct 2024 04:00), Max: 100.6 (14 Oct 2024 13:00)  HR: 65 (15 Oct 2024 07:00) (52 - 110)  BP: 106/56 (15 Oct 2024 07:00) (97/61 - 131/61)  BP(mean): 76 (15 Oct 2024 07:00) (71 - 91)  RR: 28 (15 Oct 2024 07:00) (11 - 30)  SpO2: 96% (15 Oct 2024 07:00) (93% - 100%)      I&O's Detail    14 Oct 2024 07:01  -  15 Oct 2024 07:00  --------------------------------------------------------  IN:    Amiodarone: 366.3 mL    IV PiggyBack: 150 mL    Oral Fluid: 597 mL  Total IN: 1113.3 mL    OUT:    Indwelling Catheter - Urethral (mL): 675 mL    Voided (mL): 350 mL  Total OUT: 1025 mL        Net:   I&O's Detail    13 Oct 2024 07:01  -  14 Oct 2024 07:00  --------------------------------------------------------  Total NET: 6.6 mL      14 Oct 2024 07:01  -  15 Oct 2024 07:00  --------------------------------------------------------  Total NET: 88.3 mL        CAPILLARY BLOOD GLUCOSE      POCT Blood Glucose.: 109 mg/dL (15 Oct 2024 06:42)  POCT Blood Glucose.: 176 mg/dL (14 Oct 2024 21:15)  POCT Blood Glucose.: 202 mg/dL (14 Oct 2024 17:00)  POCT Blood Glucose.: 215 mg/dL (14 Oct 2024 11:30)  POCT Blood Glucose.: 190 mg/dL (14 Oct 2024 08:11)    Physical Exam:  General: NAD; A&Ox3, no focal deficits clear speech  Cardiac: S1/S2, RRR, no murmur, no rubs  Lungs: unlabored respirations, CTA b/l, no wheeze, no rales, no crackles  Abdomen: Soft/NT/ND; positive bowel sounds x 4  Sternum: Intact, no click, incision healing well with no drainage  Incisions: Incisions clean/dry/intact  Extremities: mild edema b/l lower extremities; good capillary refill; no cyanosis; palpable 1+ pedal pulses b/l    Central Venous Catheter: Yes[x]  No[] , If Yes indication:    critical       Day #4  EPICARDIAL WIRES:  [x] YES [] NO                                              Day #4  BOWEL MOVEMENT:  [] YES [x] NO, If No, Timing since last BM:      Day #          LABS:                        8.5[L]  15.56[H] )-----------( 252      ( 15 Oct 2024 02:00 )             26.2[L]                        9.2[L]  19.91[H] )-----------( 220      ( 14 Oct 2024 03:07 )             28.3[L]    10-15    137  |  103  |  46[H]  ----------------------------<  113[H]  4.0   |  25  |  1.7[H]  10-14    139  |  104  |  38[H]  ----------------------------<  142[H]  4.2   |  24  |  1.3    Ca    8.4      15 Oct 2024 02:00  Mg     2.4     10-15    TPro  4.8[L] [6.0 - 8.0]  /  Alb  2.8[L] [3.5 - 5.2]  /  TBili  0.5 [0.2 - 1.2]  /  DBili  x   /  AST  62[H] [0 - 41]  /  ALT  52[H] [0 - 41]  /  AlkPhos  91 [30 - 115]  10-15      Urinalysis Basic - ( 15 Oct 2024 02:00 )    Color: x / Appearance: x / SG: x / pH: x  Gluc: 113 mg/dL / Ketone: x  / Bili: x / Urobili: x   Blood: x / Protein: x / Nitrite: x   Leuk Esterase: x / RBC: x / WBC x   Sq Epi: x / Non Sq Epi: x / Bacteria: x        RADIOLOGY & ADDITIONAL TESTS:  CXR: < from: Xray Chest 1 View- PORTABLE-Routine (Xray Chest 1 View- PORTABLE-Routine in AM.) (10.15.24 @ 05:06) >    IMPRESSION:    1. Unchanged patchy bilateral opacities/pleural effusions.    < end of copied text >    EKG: < from: 12 Lead ECG (10.15.24 @ 07:13) >    Ventricular Rate 59 BPM    Atrial Rate 59 BPM    P-R Interval 148 ms    QRS Duration 104 ms    Q-T Interval 464 ms    QTC Calculation(Bazett) 459 ms    P Axis 49 degrees    R Axis 14 degrees    T Axis 19 degrees    Diagnosis Line Sinus bradycardia  Cannot rule out Anterior infarct , age undetermined  Abnormal ECG    < end of copied text >    MEDICATIONS  (STANDING):  acetaminophen   IVPB .. 1000 milliGRAM(s) IV Intermittent once  acetaminophen   IVPB .. 1000 milliGRAM(s) IV Intermittent once  acetaminophen   IVPB .. 1000 milliGRAM(s) IV Intermittent once  allopurinol 100 milliGRAM(s) Oral daily  aMIOdarone Infusion 0.5 mG/Min (16.7 mL/Hr) IV Continuous <Continuous>  aspirin enteric coated 325 milliGRAM(s) Oral daily  bisacodyl 5 milliGRAM(s) Oral every 12 hours  chlorhexidine 2% Cloths 1 Application(s) Topical daily  colchicine 0.6 milliGRAM(s) Oral two times a day  dextrose 5%. 1000 milliLiter(s) (100 mL/Hr) IV Continuous <Continuous>  dextrose 5%. 1000 milliLiter(s) (50 mL/Hr) IV Continuous <Continuous>  dextrose 50% Injectable 50 milliLiter(s) IV Push every 15 minutes  furosemide   Injectable 20 milliGRAM(s) IV Push every 12 hours  glucagon  Injectable 1 milliGRAM(s) IntraMuscular once  heparin   Injectable 5000 Unit(s) SubCutaneous every 12 hours  insulin glargine Injectable (LANTUS) 25 Unit(s) SubCutaneous every morning  insulin lispro (ADMELOG) corrective regimen sliding scale   SubCutaneous three times a day before meals  insulin lispro (ADMELOG) corrective regimen sliding scale   SubCutaneous at bedtime  insulin lispro Injectable (ADMELOG) 7 Unit(s) SubCutaneous three times a day before meals  insulin regular Infusion 1 Unit(s)/Hr (1 mL/Hr) IV Continuous <Continuous>  ipratropium    for Nebulization 500 MICROGram(s) Nebulizer every 6 hours  metoprolol tartrate 25 milliGRAM(s) Oral two times a day  ondansetron Injectable 4 milliGRAM(s) IV Push once  pantoprazole    Tablet 40 milliGRAM(s) Oral before breakfast  polyethylene glycol 3350 17 Gram(s) Oral daily  potassium chloride    Tablet ER 40 milliEquivalent(s) Oral daily  senna 2 Tablet(s) Oral at bedtime  sodium chloride 0.9%. 1000 milliLiter(s) (20 mL/Hr) IV Continuous <Continuous>  tamsulosin 0.4 milliGRAM(s) Oral at bedtime    MEDICATIONS  (PRN):  dextrose Oral Gel 15 Gram(s) Oral once PRN Blood Glucose LESS THAN 70 milliGRAM(s)/deciliter  melatonin 5 milliGRAM(s) Oral at bedtime PRN Insomnia  oxyCODONE    IR 10 milliGRAM(s) Oral every 4 hours PRN Severe Pain (7 - 10)  oxyCODONE    IR 5 milliGRAM(s) Oral every 4 hours PRN Moderate Pain (4 - 6)    HEPARIN:  [x] YES [] NO  Dose: 5000 UNITS Q12H  SCD's: YES b/l  GI Prophylaxis: Protonix [x], Pepcid [], None [], (Contra-indication:.....)    Post-Op Beta-Blockers: Yes [x], No[], If No, then contraindication:  Post-Op Aspirin: Yes [x],  No [], If No, then contraindication:  Post-Op Statin: Yes [x], No[], If No, then contraindication:    Allergies    No Known Allergies    Intolerances      Ambulation/Activity Status: ambulate     Assessment/Plan:  84y Male status-post CABGx4 POD#4  - Case and plan discussed with CTU Intensivist and CT Surgeon - Dr. Portillo/Pedro Pablo/Bobbi  - Continue CTU supportive care    - Continue DVT/GI prophylaxis  - Incentive Spirometry 10 times an hour  - Continue to advance physical activity as tolerated and continue PT/OT as directed  1. CAD: Continue ASA, statin, BB, pain control prn, d/c sump, cts, and swan  2. HTN: start low dose metoprolol, cont to monitor, start norvasc   3. A. Fib prophylaxis: bb, cont to monitor electrolytes   4. Post-op Hypoxia: cont nebs, wean o2 as tolerated, encourage incentive spirometry   5. DM/Glucose Control: pt has HAGMA/DKA POD#1- closed anion gap now was on insulin gtt, start lantus 25 and humalog 7 with sliding scale   6. pericarditis- cont colchicine     Social Service Disposition:  pt to assess

## 2024-10-16 ENCOUNTER — NON-APPOINTMENT (OUTPATIENT)
Age: 84
End: 2024-10-16

## 2024-10-16 VITALS — HEART RATE: 71 BPM | TEMPERATURE: 98 F | RESPIRATION RATE: 20 BRPM | OXYGEN SATURATION: 96 %

## 2024-10-16 LAB
ALBUMIN SERPL ELPH-MCNC: 3.1 G/DL — LOW (ref 3.5–5.2)
ALP SERPL-CCNC: 96 U/L — SIGNIFICANT CHANGE UP (ref 30–115)
ALT FLD-CCNC: 45 U/L — HIGH (ref 0–41)
ANION GAP SERPL CALC-SCNC: 12 MMOL/L — SIGNIFICANT CHANGE UP (ref 7–14)
AST SERPL-CCNC: 42 U/L — HIGH (ref 0–41)
BASOPHILS # BLD AUTO: 0.03 K/UL — SIGNIFICANT CHANGE UP (ref 0–0.2)
BASOPHILS NFR BLD AUTO: 0.3 % — SIGNIFICANT CHANGE UP (ref 0–1)
BILIRUB SERPL-MCNC: 0.6 MG/DL — SIGNIFICANT CHANGE UP (ref 0.2–1.2)
BUN SERPL-MCNC: 44 MG/DL — HIGH (ref 10–20)
CALCIUM SERPL-MCNC: 8.2 MG/DL — LOW (ref 8.4–10.5)
CHLORIDE SERPL-SCNC: 105 MMOL/L — SIGNIFICANT CHANGE UP (ref 98–110)
CO2 SERPL-SCNC: 23 MMOL/L — SIGNIFICANT CHANGE UP (ref 17–32)
CREAT SERPL-MCNC: 1.4 MG/DL — SIGNIFICANT CHANGE UP (ref 0.7–1.5)
EGFR: 50 ML/MIN/1.73M2 — LOW
EOSINOPHIL # BLD AUTO: 0.19 K/UL — SIGNIFICANT CHANGE UP (ref 0–0.7)
EOSINOPHIL NFR BLD AUTO: 1.6 % — SIGNIFICANT CHANGE UP (ref 0–8)
GLUCOSE BLDC GLUCOMTR-MCNC: 123 MG/DL — HIGH (ref 70–99)
GLUCOSE BLDC GLUCOMTR-MCNC: 133 MG/DL — HIGH (ref 70–99)
GLUCOSE BLDC GLUCOMTR-MCNC: 92 MG/DL — SIGNIFICANT CHANGE UP (ref 70–99)
GLUCOSE SERPL-MCNC: 85 MG/DL — SIGNIFICANT CHANGE UP (ref 70–99)
HCT VFR BLD CALC: 27.5 % — LOW (ref 42–52)
HGB BLD-MCNC: 9 G/DL — LOW (ref 14–18)
IMM GRANULOCYTES NFR BLD AUTO: 0.6 % — HIGH (ref 0.1–0.3)
LYMPHOCYTES # BLD AUTO: 1.09 K/UL — LOW (ref 1.2–3.4)
LYMPHOCYTES # BLD AUTO: 9.2 % — LOW (ref 20.5–51.1)
MAGNESIUM SERPL-MCNC: 2.1 MG/DL — SIGNIFICANT CHANGE UP (ref 1.8–2.4)
MCHC RBC-ENTMCNC: 31.7 PG — HIGH (ref 27–31)
MCHC RBC-ENTMCNC: 32.7 G/DL — SIGNIFICANT CHANGE UP (ref 32–37)
MCV RBC AUTO: 96.8 FL — HIGH (ref 80–94)
MONOCYTES # BLD AUTO: 0.75 K/UL — HIGH (ref 0.1–0.6)
MONOCYTES NFR BLD AUTO: 6.3 % — SIGNIFICANT CHANGE UP (ref 1.7–9.3)
NEUTROPHILS # BLD AUTO: 9.75 K/UL — HIGH (ref 1.4–6.5)
NEUTROPHILS NFR BLD AUTO: 82 % — HIGH (ref 42.2–75.2)
NRBC # BLD: 0 /100 WBCS — SIGNIFICANT CHANGE UP (ref 0–0)
PLATELET # BLD AUTO: 293 K/UL — SIGNIFICANT CHANGE UP (ref 130–400)
PMV BLD: 10.4 FL — SIGNIFICANT CHANGE UP (ref 7.4–10.4)
POTASSIUM SERPL-MCNC: 4.1 MMOL/L — SIGNIFICANT CHANGE UP (ref 3.5–5)
POTASSIUM SERPL-SCNC: 4.1 MMOL/L — SIGNIFICANT CHANGE UP (ref 3.5–5)
PROT SERPL-MCNC: 4.9 G/DL — LOW (ref 6–8)
RBC # BLD: 2.84 M/UL — LOW (ref 4.7–6.1)
RBC # FLD: 13.7 % — SIGNIFICANT CHANGE UP (ref 11.5–14.5)
SODIUM SERPL-SCNC: 140 MMOL/L — SIGNIFICANT CHANGE UP (ref 135–146)
WBC # BLD: 11.88 K/UL — HIGH (ref 4.8–10.8)
WBC # FLD AUTO: 11.88 K/UL — HIGH (ref 4.8–10.8)

## 2024-10-16 PROCEDURE — 99232 SBSQ HOSP IP/OBS MODERATE 35: CPT

## 2024-10-16 PROCEDURE — 71046 X-RAY EXAM CHEST 2 VIEWS: CPT | Mod: 26

## 2024-10-16 PROCEDURE — 93010 ELECTROCARDIOGRAM REPORT: CPT

## 2024-10-16 RX ORDER — FERROUS SULFATE 325(65) MG
1 TABLET ORAL
Qty: 30 | Refills: 0
Start: 2024-10-16

## 2024-10-16 RX ORDER — FUROSEMIDE 10 MG/ML
40 INJECTION INTRAVENOUS ONCE
Refills: 0 | Status: COMPLETED | OUTPATIENT
Start: 2024-10-16 | End: 2024-10-16

## 2024-10-16 RX ORDER — OXYCODONE AND ACETAMINOPHEN 5; 325 MG/1; MG/1
1 TABLET ORAL
Qty: 20 | Refills: 0
Start: 2024-10-16

## 2024-10-16 RX ORDER — PANTOPRAZOLE SODIUM 40 MG/1
1 TABLET, DELAYED RELEASE ORAL
Qty: 30 | Refills: 0
Start: 2024-10-16

## 2024-10-16 RX ORDER — FOLIC ACID 1 MG/1
1 TABLET ORAL
Qty: 30 | Refills: 0
Start: 2024-10-16

## 2024-10-16 RX ORDER — ASPIRIN 325 MG
1 TABLET ORAL
Qty: 30 | Refills: 0
Start: 2024-10-16

## 2024-10-16 RX ORDER — BENZOCAINE .06; .7 ML/1; ML/1
0 SWAB TOPICAL
Qty: 100 | Refills: 1
Start: 2024-10-16

## 2024-10-16 RX ORDER — AMIODARONE HYDROCHLORIDE 50 MG/ML
1 INJECTION, SOLUTION INTRAVENOUS
Qty: 30 | Refills: 0
Start: 2024-10-16

## 2024-10-16 RX ORDER — FUROSEMIDE 10 MG/ML
1 INJECTION INTRAVENOUS
Qty: 7 | Refills: 0
Start: 2024-10-16 | End: 2024-10-22

## 2024-10-16 RX ORDER — METOPROLOL TARTRATE 50 MG
1 TABLET ORAL
Qty: 60 | Refills: 0
Start: 2024-10-16

## 2024-10-16 RX ORDER — ATORVASTATIN CALCIUM 10 MG/1
40 TABLET, FILM COATED ORAL AT BEDTIME
Refills: 0 | Status: DISCONTINUED | OUTPATIENT
Start: 2024-10-16 | End: 2024-10-16

## 2024-10-16 RX ORDER — TAMSULOSIN HCL 0.4 MG
1 CAPSULE ORAL
Qty: 30 | Refills: 0
Start: 2024-10-16

## 2024-10-16 RX ADMIN — FOLIC ACID 1 MILLIGRAM(S): 1 TABLET ORAL at 11:20

## 2024-10-16 RX ADMIN — INSULIN GLARGINE 25 UNIT(S): 300 INJECTION, SOLUTION SUBCUTANEOUS at 07:51

## 2024-10-16 RX ADMIN — Medication 325 MILLIGRAM(S): at 11:21

## 2024-10-16 RX ADMIN — Medication 25 MILLIGRAM(S): at 05:18

## 2024-10-16 RX ADMIN — Medication 7 UNIT(S): at 11:38

## 2024-10-16 RX ADMIN — Medication 100 MILLIGRAM(S): at 11:21

## 2024-10-16 RX ADMIN — Medication 0.6 MILLIGRAM(S): at 05:18

## 2024-10-16 RX ADMIN — Medication 325 MILLIGRAM(S): at 11:20

## 2024-10-16 RX ADMIN — AMIODARONE HYDROCHLORIDE 200 MILLIGRAM(S): 50 INJECTION, SOLUTION INTRAVENOUS at 05:18

## 2024-10-16 RX ADMIN — Medication 500 MICROGRAM(S): at 10:05

## 2024-10-16 RX ADMIN — Medication 7 UNIT(S): at 07:50

## 2024-10-16 RX ADMIN — BISACODYL 5 MILLIGRAM(S): 5 TABLET, COATED ORAL at 05:19

## 2024-10-16 RX ADMIN — FUROSEMIDE 40 MILLIGRAM(S): 10 INJECTION INTRAVENOUS at 11:19

## 2024-10-16 RX ADMIN — Medication 40 MILLIEQUIVALENT(S): at 11:20

## 2024-10-16 RX ADMIN — PANTOPRAZOLE SODIUM 40 MILLIGRAM(S): 40 TABLET, DELAYED RELEASE ORAL at 05:19

## 2024-10-16 NOTE — PROGRESS NOTE ADULT - SUBJECTIVE AND OBJECTIVE BOX
CTU Attending Progress Daily Note     15 Oct 2024 10:16    Procedure:       CABG                                            POD#         4          Patient seen as post-op critical care follow-up    HPI:  84 years old male patient with pmh of HLD, DM, HTN brought to the ed for fall. Hx taken from the patient and is aox3 at time of the interview.  Patient states that he was going down to his basement, and when he got there the next thing he remembers is lying himself on the floor on his back. He does not know wether he lost consciousness or not not but thinks that he lost conciousnes as he does not know what actually happened and how he found himself on the floor. On further inquiry, patient states that he was trying to get up from the floor then he felt that his left shoulder is weak, painful and stiff. on trying to get up from the ground, he state that he hit his head against the ground .  He was finally able to gain his strength back and get up, he went up to his room and slept. His sons called his PCP who advised him to go to the ER. Further review of the systems is negative for any headache, light headiness dizziness palpitations, chest pain, nausea, vomit, diarrhea, dysuria , frequency, urgency, extremity weakness and other significant complaints.        (05 Oct 2024 02:37)    See preop testing chart H&P    Interval event for past 24 hr:  ALEX GROVER  84y had Afib    Current Complains:  ALEX GROVER has no new complaints    REVIEW OF SYSTEMS:  CONSTITUTIONAL:  [-] weakness, [-] fevers, [-] chills  EYES/ENT: [-] visual changes, [-] vertigo, [-] throat pain   NECK: [-] pain, [-] stiffness  RESPIRATORY: [-] cough, [-] wheezing, [-] hemoptysis, [-] shortness of breath  CARDIOVASCULAR: [-] chest pain, [-] palpitations, [-] orthopnea  GASTROINTESTINAL:    [-]abdominal pain, [-] nausea, [-] vomiting, [-] hematemesis, [-] diarrhea, [-] constipation, [-] melena, [-] hematochezia.  GENITOURINARY: [-] dysuria, [-] frequency, [-] hematuria  NEUROLOGICAL: [-] numbness, [-] weakness  SKIN: [-] itching, [-] burning, [-] rashes, [-] lesions   All other review of systems is negative unless indicated above.    [  ] Unable to assess ROS because :    OBJECTIVE:  ICU Vital Signs Last 24 Hrs  T(C): 36 (15 Oct 2024 08:00), Max: 38.1 (14 Oct 2024 13:00)  T(F): 96.8 (15 Oct 2024 08:00), Max: 100.6 (14 Oct 2024 13:00)  HR: 66 (15 Oct 2024 08:00) (52 - 110)  BP: 97/54 (15 Oct 2024 08:00) (97/54 - 131/61)  BP(mean): 72 (15 Oct 2024 08:00) (72 - 91)  ABP: --  ABP(mean): --  RR: 27 (15 Oct 2024 08:00) (11 - 30)  SpO2: 95% (15 Oct 2024 08:00) (94% - 100%)    O2 Parameters below as of 15 Oct 2024 08:00  Patient On (Oxygen Delivery Method): nasal cannula  O2 Flow (L/min): 4          I&O's Summary    14 Oct 2024 07:01  -  15 Oct 2024 07:00  --------------------------------------------------------  IN: 1113.3 mL / OUT: 1025 mL / NET: 88.3 mL    15 Oct 2024 07:01  -  15 Oct 2024 10:16  --------------------------------------------------------  IN: 200 mL / OUT: 100 mL / NET: 100 mL      PHYSICAL EXAM:  General: WN/WD NAD    HEENT:     [+] NCAT      Neck:         [+] FROM      Chest:         [-] Sternal click   [-] Sternal drainage      Lungs:          [+] CTA  BL    Cardiac:       [+] S1 [+] S2    [+] RRR   [-] Irregular       Abdomen:    [+] BS    [+] Soft    [+] Non-tender     [-] Distended      Extremities:    [+] Pulses     Neuro:        [+] Awake   [+]  Alert       Skin:          [+] Normal incisions         CAPILLARY BLOOD GLUCOSE      POCT Blood Glucose.: 109 mg/dL (15 Oct 2024 06:42)    CAPILLARY BLOOD GLUCOSE      POCT Blood Glucose.: 109 mg/dL (15 Oct 2024 06:42)  POCT Blood Glucose.: 176 mg/dL (14 Oct 2024 21:15)  POCT Blood Glucose.: 202 mg/dL (14 Oct 2024 17:00)  POCT Blood Glucose.: 215 mg/dL (14 Oct 2024 11:30)      HOSPITAL MEDICATIONS:  MEDICATIONS  (STANDING):  acetaminophen   IVPB .. 1000 milliGRAM(s) IV Intermittent once  acetaminophen   IVPB .. 1000 milliGRAM(s) IV Intermittent once  allopurinol 100 milliGRAM(s) Oral daily  aMIOdarone    Tablet 200 milliGRAM(s) Oral daily  aMIOdarone Infusion 0.5 mG/Min (16.7 mL/Hr) IV Continuous <Continuous>  aspirin enteric coated 325 milliGRAM(s) Oral daily  bisacodyl 5 milliGRAM(s) Oral every 12 hours  chlorhexidine 2% Cloths 1 Application(s) Topical daily  colchicine 0.6 milliGRAM(s) Oral two times a day  dextrose 5%. 1000 milliLiter(s) (100 mL/Hr) IV Continuous <Continuous>  dextrose 5%. 1000 milliLiter(s) (50 mL/Hr) IV Continuous <Continuous>  dextrose 50% Injectable 50 milliLiter(s) IV Push every 15 minutes  ferrous    sulfate 325 milliGRAM(s) Oral daily  folic acid 1 milliGRAM(s) Oral daily  glucagon  Injectable 1 milliGRAM(s) IntraMuscular once  heparin   Injectable 5000 Unit(s) SubCutaneous every 12 hours  insulin glargine Injectable (LANTUS) 25 Unit(s) SubCutaneous every morning  insulin lispro (ADMELOG) corrective regimen sliding scale   SubCutaneous three times a day before meals  insulin lispro (ADMELOG) corrective regimen sliding scale   SubCutaneous at bedtime  insulin lispro Injectable (ADMELOG) 7 Unit(s) SubCutaneous three times a day before meals  insulin regular Infusion 1 Unit(s)/Hr (1 mL/Hr) IV Continuous <Continuous>  ipratropium    for Nebulization 500 MICROGram(s) Nebulizer every 6 hours  metoprolol tartrate 25 milliGRAM(s) Oral two times a day  ondansetron Injectable 4 milliGRAM(s) IV Push once  pantoprazole    Tablet 40 milliGRAM(s) Oral before breakfast  polyethylene glycol 3350 17 Gram(s) Oral daily  potassium chloride    Tablet ER 40 milliEquivalent(s) Oral daily  senna 2 Tablet(s) Oral at bedtime  sodium chloride 0.9%. 1000 milliLiter(s) (20 mL/Hr) IV Continuous <Continuous>  tamsulosin 0.4 milliGRAM(s) Oral at bedtime    MEDICATIONS  (PRN):  dextrose Oral Gel 15 Gram(s) Oral once PRN Blood Glucose LESS THAN 70 milliGRAM(s)/deciliter  melatonin 5 milliGRAM(s) Oral at bedtime PRN Insomnia  oxyCODONE    IR 10 milliGRAM(s) Oral every 4 hours PRN Severe Pain (7 - 10)  oxyCODONE    IR 5 milliGRAM(s) Oral every 4 hours PRN Moderate Pain (4 - 6)      LABS:                          8.5    15.56 )-----------( 252      ( 15 Oct 2024 02:00 )             26.2     10-15    137  |  103  |  46[H]  ----------------------------<  113[H]  4.0   |  25  |  1.7[H]    Ca    8.4      15 Oct 2024 02:00  Mg     2.4     10-15    TPro  4.8[L]  /  Alb  2.8[L]  /  TBili  0.5  /  DBili  x   /  AST  62[H]  /  ALT  52[H]  /  AlkPhos  91  10-15      Urinalysis Basic - ( 15 Oct 2024 02:00 )    Color: x / Appearance: x / SG: x / pH: x  Gluc: 113 mg/dL / Ketone: x  / Bili: x / Urobili: x   Blood: x / Protein: x / Nitrite: x   Leuk Esterase: x / RBC: x / WBC x   Sq Epi: x / Non Sq Epi: x / Bacteria: x          RADIOLOGY:    < from: Xray Chest 1 View- PORTABLE-Routine (Xray Chest 1 View- PORTABLE-Routine in AM.) (10.15.24 @ 05:06) >  IMPRESSION:    1. Unchanged patchy bilateral opacities/pleural effusions.    < end of copied text >    Assessment:  CAD SP CABG  Acute blood loss anemia  Afib    PAST MEDICAL & SURGICAL HISTORY:  DM (diabetes mellitus)      HTN (hypertension)      HLD (hyperlipidemia)      H/O knee surgery          PLAN:  Neuro: Pain control  Pulm: Encourage coughing, deep breathing and use of incentive spirometry. Wean off supplemental oxygen as able. Daily CXR.   Cardio: Monitor telemetry/alarms. Amiodarone 200 PO Q 24h  GI: Tolerating diet. Continue stool softeners. Continue GI prophylaxis  Renal: monitor urine output, supplement electrolytes as needed  Vasc: Heparin SC/SCDs for DVT prophylaxis  Heme: Monitor H/H.   ID: Trend WBCs  Endocrine: Monitor finger stick blood sugar and control hyperglycemia with insulin  Physical Therapy: OOB/ambulate        Discussed with Cardiothoracic Team at AM rounds.     CTU Attending Progress Daily Note     16 Oct 2024 14:40  Procedure:  POD#:5  HPI:  84 years old male patient with pmh of HLD, DM, HTN brought to the ed for fall. Hx taken from the patient and is aox3 at time of the interview.  Patient states that he was going down to his basement, and when he got there the next thing he remembers is lying himself on the floor on his back. He does not know wether he lost consciousness or not not but thinks that he lost conciousnes as he does not know what actually happened and how he found himself on the floor. On further inquiry, patient states that he was trying to get up from the floor then he felt that his left shoulder is weak, painful and stiff. on trying to get up from the ground, he state that he hit his head against the ground .  He was finally able to gain his strength back and get up, he went up to his room and slept. His sons called his PCP who advised him to go to the ER. Further review of the systems is negative for any headache, light headiness dizziness palpitations, chest pain, nausea, vomit, diarrhea, dysuria , frequency, urgency, extremity weakness and other significant complaints.    (05 Oct 2024 02:37)      Hospital Course:  acute mi  ARIELLE  elevated liver enzymes  10/16/24 s/p CABG     today  ambulating comfortable on RA      OBJECTIVE:  ICU Vital Signs Last 24 Hrs  T(C): 36.8 (16 Oct 2024 12:00), Max: 36.8 (16 Oct 2024 08:00)  T(F): 98.2 (16 Oct 2024 12:00), Max: 98.2 (16 Oct 2024 08:00)  HR: 71 (16 Oct 2024 12:00) (62 - 87)  BP: 144/67 (16 Oct 2024 11:00) (112/65 - 154/70)  BP(mean): 96 (16 Oct 2024 11:00) (81 - 101)  ABP: --  ABP(mean): --  RR: 20 (16 Oct 2024 12:00) (14 - 25)  SpO2: 96% (16 Oct 2024 12:00) (91% - 100%)    O2 Parameters below as of 16 Oct 2024 13:00  Patient On (Oxygen Delivery Method): room air          I&O's Summary    15 Oct 2024 07:01  -  16 Oct 2024 07:00  --------------------------------------------------------  IN: 480 mL / OUT: 670 mL / NET: -190 mL    16 Oct 2024 07:01  -  16 Oct 2024 14:40  --------------------------------------------------------  IN: 250 mL / OUT: 300 mL / NET: -50 mL      I&O's Detail    15 Oct 2024 07:01  -  16 Oct 2024 07:00  --------------------------------------------------------  IN:    IV PiggyBack: 100 mL    Oral Fluid: 380 mL  Total IN: 480 mL    OUT:    Voided (mL): 670 mL  Total OUT: 670 mL    Total NET: -190 mL      16 Oct 2024 07:01  -  16 Oct 2024 14:40  --------------------------------------------------------  IN:    Oral Fluid: 250 mL  Total IN: 250 mL    OUT:    Voided (mL): 300 mL  Total OUT: 300 mL    Total NET: -50 mL      CAPILLARY BLOOD GLUCOSE      POCT Blood Glucose.: 133 mg/dL (16 Oct 2024 11:35)  POCT Blood Glucose.: 123 mg/dL (16 Oct 2024 11:18)  POCT Blood Glucose.: 92 mg/dL (16 Oct 2024 06:57)  POCT Blood Glucose.: 108 mg/dL (15 Oct 2024 22:12)  POCT Blood Glucose.: 93 mg/dL (15 Oct 2024 16:20)    LABS:                          9.0    11.88 )-----------( 293      ( 16 Oct 2024 02:05 )             27.5     10-16    140  |  105  |  44[H]  ----------------------------<  85  4.1   |  23  |  1.4    Ca    8.2[L]      16 Oct 2024 02:05  Mg     2.1     10-16    TPro  4.9[L]  /  Alb  3.1[L]  /  TBili  0.6  /  DBili  x   /  AST  42[H]  /  ALT  45[H]  /  AlkPhos  96  10-16      Home Medications:  ALLOPURINOL 100 MG TABLET: 1 tab(s) orally once a day (05 Oct 2024 04:51)  ATORVASTATIN 40 MG TABLET: 1 tab(s) orally once a day (05 Oct 2024 04:51)  dapagliflozin 5 mg oral tablet: 1 tab(s) orally once a day (05 Oct 2024 13:01)  ezetimibe 10 mg oral tablet: 1 tab(s) orally once a day (05 Oct 2024 04:51)  JANUMET 50/500 MG TABLET MS: TAKE 1 TABLET EVERY DAY (05 Oct 2024 04:51)    HOSPITAL MEDICATIONS:  MEDICATIONS  (STANDING):  acetaminophen   IVPB .. 1000 milliGRAM(s) IV Intermittent once  allopurinol 100 milliGRAM(s) Oral daily  aMIOdarone    Tablet 200 milliGRAM(s) Oral daily  aspirin enteric coated 325 milliGRAM(s) Oral daily  atorvastatin 40 milliGRAM(s) Oral at bedtime  bisacodyl 5 milliGRAM(s) Oral every 12 hours  chlorhexidine 2% Cloths 1 Application(s) Topical daily  colchicine 0.6 milliGRAM(s) Oral two times a day  dextrose 5%. 1000 milliLiter(s) (100 mL/Hr) IV Continuous <Continuous>  dextrose 5%. 1000 milliLiter(s) (50 mL/Hr) IV Continuous <Continuous>  dextrose 50% Injectable 50 milliLiter(s) IV Push every 15 minutes  ferrous    sulfate 325 milliGRAM(s) Oral daily  folic acid 1 milliGRAM(s) Oral daily  glucagon  Injectable 1 milliGRAM(s) IntraMuscular once  heparin   Injectable 5000 Unit(s) SubCutaneous every 12 hours  insulin glargine Injectable (LANTUS) 25 Unit(s) SubCutaneous every morning  insulin lispro (ADMELOG) corrective regimen sliding scale   SubCutaneous at bedtime  insulin lispro (ADMELOG) corrective regimen sliding scale   SubCutaneous three times a day before meals  insulin lispro Injectable (ADMELOG) 7 Unit(s) SubCutaneous three times a day before meals  ipratropium    for Nebulization 500 MICROGram(s) Nebulizer every 6 hours  metoprolol tartrate 25 milliGRAM(s) Oral two times a day  ondansetron Injectable 4 milliGRAM(s) IV Push once  pantoprazole    Tablet 40 milliGRAM(s) Oral before breakfast  polyethylene glycol 3350 17 Gram(s) Oral daily  potassium chloride    Tablet ER 40 milliEquivalent(s) Oral daily  senna 2 Tablet(s) Oral at bedtime  tamsulosin 0.4 milliGRAM(s) Oral at bedtime    MEDICATIONS  (PRN):  dextrose Oral Gel 15 Gram(s) Oral once PRN Blood Glucose LESS THAN 70 milliGRAM(s)/deciliter  melatonin 5 milliGRAM(s) Oral at bedtime PRN Insomnia  oxyCODONE    IR 10 milliGRAM(s) Oral every 4 hours PRN Severe Pain (7 - 10)  oxyCODONE    IR 5 milliGRAM(s) Oral every 4 hours PRN Moderate Pain (4 - 6)    RADIOLOGY:  Chest X-ray Reviewed    REVIEW OF SYSTEMS:  CONSTITUTIONAL: [X] all negative; [ ] weakness, [ ] fevers, [ ] chills  EYES/ENT: [X] all negative; [ ] visual changes, [ ] vertigo, [ ] throat pain   NECK: [X] all negative; [ ] pain, [ ] stiffness  RESPIRATORY: [] all negative, [ ] cough, [ ] wheezing, [ ] hemoptysis, [ ] shortness of breath  CARDIOVASCULAR: [] all negative; [ ] chest pain, [ ] palpitations, [ ] orthopnea  GASTROINTESTINAL: [X] all negative; [ ]abdominal pain, [ ] nausea, [ ] vomiting, [ ] hematemesis, [ ] diarrhea, [ ] constipation, [ ] melena, [ ] hematochezia.  GENITOURINARY: [X] all negative; [ ] dysuria, [ ] frequency, [ ] hematuria  NEUROLOGICAL: [X] all negative; [ ] numbness, [ ] weakness  SKIN: [X] all negative; [ ] itching, [ ] burning, [ ] rashes, [ ] lesions        PHYSICAL EXAM:          CONSTITUTIONAL: Well-developed; well-nourished; in no acute distress.   	SKIN: warm, dry  	HEAD: Normocephalic; atraumatic.  	EYES: PERRL, EOM, no conj injection, sclera clear  	ENT: No nasal discharge; airway clear.  	NECK: Supple; non tender.   	CARD: S1, S2 normal; no murmurs, gallops, or rubs. Regular rate and rhythm.  no carotid bruits  	RESP: CTA B/L; good air movement No wheezes, rales or rhonchi.  	ABD: Soft, not tender, not distended,  no rebound or guarding, bowel sounds present  	EXT: + edema  	NEURO: Alert, awake, motor 5/5 R, 5/5 L    Assessment  CAD/ASHD 10/11/2024 s/p CABG x4, severe small distal disease,   anemia  ARIELLE  elevated liver enzymes  HTN  HLD  DM2    Plan:    Neuro:  Multimodal pain management  Tylenol, Lidoderm patch, gabapentin, opiates as needed  non focal ambulating    CV:  Monitor perfusion, , lactate, UOP, index and MVO2 if available  ASA, statin restart atorvastatin and zetia, LFT almost normalized   Beta blockers metoprolo   Amio ppx 200 mg daily      Resp:  Acute pulmonary insufficiency post surgery  on RA , no sob  IS / Chest PT  OOBTC and Ambulate  Nebulizers as needed    GI:  Heart healthy diet  Bowel Regimen - escalate as needed  PUD ppx per protocol    Renal  improve kidney fx  diuresis , lasix , still with bilateral leg edema      Endo:  resume po diabetes medications    Heme:  Acute blood loss anemia post surgery  h/h stable    ID:  off antibiotics      Care plan discussed with CT ICU care team and attending surgeon Dr Chamorro F  discharge planning , f/u with dr Chamorro in 1 week                        .

## 2024-10-16 NOTE — CHART NOTE - NSCHARTNOTEFT_GEN_A_CORE
Pt is s/p cabg and needs a rolling walker for ambulation assistance being he is deconditioned. Pt is s/p cabg and needs a rolling walker for ambulation assistance being he is deconditioned.  The patient is able to walk safely with the walker.  The patient's mobility deficit can be sufficiently resolved with the use of the walker.

## 2024-10-16 NOTE — PROGRESS NOTE ADULT - SUBJECTIVE AND OBJECTIVE BOX
OPERATIVE PROCEDURE(s):        CABGx4            POD #     5                  84yMale  SURGEON(s): GINA Chamorro  SUBJECTIVE ASSESSMENT: pt seen and examined. no acute complaints   Vital Signs Last 24 Hrs  T(F): 98.2 (16 Oct 2024 08:00), Max: 98.2 (16 Oct 2024 08:00)  HR: 68 (16 Oct 2024 08:45) (59 - 75)  BP: 116/60 (16 Oct 2024 08:45) (105/57 - 154/70)  BP(mean): 82 (16 Oct 2024 08:45) (78 - 101)  RR: 21 (16 Oct 2024 08:45) (14 - 31)  SpO2: 94% (16 Oct 2024 08:45) (91% - 98%)      I&O's Detail    15 Oct 2024 07:01  -  16 Oct 2024 07:00  --------------------------------------------------------  IN:    IV PiggyBack: 100 mL    Oral Fluid: 380 mL  Total IN: 480 mL    OUT:    Voided (mL): 670 mL  Total OUT: 670 mL        Net:   I&O's Detail    14 Oct 2024 07:01  -  15 Oct 2024 07:00  --------------------------------------------------------  Total NET: 88.3 mL      15 Oct 2024 07:01  -  16 Oct 2024 07:00  --------------------------------------------------------  Total NET: -190 mL        CAPILLARY BLOOD GLUCOSE      POCT Blood Glucose.: 92 mg/dL (16 Oct 2024 06:57)  POCT Blood Glucose.: 108 mg/dL (15 Oct 2024 22:12)  POCT Blood Glucose.: 93 mg/dL (15 Oct 2024 16:20)  POCT Blood Glucose.: 186 mg/dL (15 Oct 2024 11:24)    Physical Exam:  General: NAD; A&Ox3, no focal deficits clear speech  Cardiac: S1/S2, RRR, no murmur, no rubs  Lungs: unlabored respirations, CTA b/l, no wheeze, no rales, no crackles  Abdomen: Soft/NT/ND; positive bowel sounds x 4  Sternum: Intact, no click, incision healing well with no drainage  Incisions: Incisions clean/dry/intact  Extremities: mild edema b/l lower extremities; good capillary refill; no cyanosis; palpable 1+ pedal pulses b/l    Central Venous Catheter: Yes[x]  No[] , If Yes indication:    critical       Day #5  EPICARDIAL WIRES:  [x] YES [] NO                                              Day #5  BOWEL MOVEMENT:  [x] YES [] NO, If No, Timing since last BM:      Day #          LABS:                        9.0[L]  11.88[H] )-----------( 293      ( 16 Oct 2024 02:05 )             27.5[L]                        8.5[L]  15.56[H] )-----------( 252      ( 15 Oct 2024 02:00 )             26.2[L]    10-16    140  |  105  |  44[H]  ----------------------------<  85  4.1   |  23  |  1.4  10-15    137  |  103  |  46[H]  ----------------------------<  113[H]  4.0   |  25  |  1.7[H]    Ca    8.2[L]      16 Oct 2024 02:05  Mg     2.1     10-16    TPro  4.9[L] [6.0 - 8.0]  /  Alb  3.1[L] [3.5 - 5.2]  /  TBili  0.6 [0.2 - 1.2]  /  DBili  x   /  AST  42[H] [0 - 41]  /  ALT  45[H] [0 - 41]  /  AlkPhos  96 [30 - 115]  10-16      Urinalysis Basic - ( 16 Oct 2024 02:05 )    Color: x / Appearance: x / SG: x / pH: x  Gluc: 85 mg/dL / Ketone: x  / Bili: x / Urobili: x   Blood: x / Protein: x / Nitrite: x   Leuk Esterase: x / RBC: x / WBC x   Sq Epi: x / Non Sq Epi: x / Bacteria: x        RADIOLOGY & ADDITIONAL TESTS:  CXR: < from: Xray Chest 1 View- PORTABLE-Routine (Xray Chest 1 View- PORTABLE-Routine in AM.) (10.15.24 @ 05:06) >  IMPRESSION:    1. Unchanged patchy bilateral opacities/pleural effusions.    < end of copied text >    EKG: < from: 12 Lead ECG (10.15.24 @ 07:13) >    Ventricular Rate 59 BPM    Atrial Rate 59 BPM    P-R Interval 148 ms    QRS Duration 104 ms    Q-T Interval 464 ms    QTC Calculation(Bazett) 459 ms    P Axis 49 degrees    R Axis 14 degrees    T Axis 19 degrees    Diagnosis Line Sinus bradycardia  Cannot rule out Anterior infarct , age undetermined  Abnormal ECG    < end of copied text >    MEDICATIONS  (STANDING):  acetaminophen   IVPB .. 1000 milliGRAM(s) IV Intermittent once  allopurinol 100 milliGRAM(s) Oral daily  aMIOdarone    Tablet 200 milliGRAM(s) Oral daily  aMIOdarone Infusion 0.5 mG/Min (16.7 mL/Hr) IV Continuous <Continuous>  aspirin enteric coated 325 milliGRAM(s) Oral daily  bisacodyl 5 milliGRAM(s) Oral every 12 hours  chlorhexidine 2% Cloths 1 Application(s) Topical daily  colchicine 0.6 milliGRAM(s) Oral two times a day  dextrose 5%. 1000 milliLiter(s) (100 mL/Hr) IV Continuous <Continuous>  dextrose 5%. 1000 milliLiter(s) (50 mL/Hr) IV Continuous <Continuous>  dextrose 50% Injectable 50 milliLiter(s) IV Push every 15 minutes  ferrous    sulfate 325 milliGRAM(s) Oral daily  folic acid 1 milliGRAM(s) Oral daily  glucagon  Injectable 1 milliGRAM(s) IntraMuscular once  heparin   Injectable 5000 Unit(s) SubCutaneous every 12 hours  insulin glargine Injectable (LANTUS) 25 Unit(s) SubCutaneous every morning  insulin lispro (ADMELOG) corrective regimen sliding scale   SubCutaneous three times a day before meals  insulin lispro (ADMELOG) corrective regimen sliding scale   SubCutaneous at bedtime  insulin lispro Injectable (ADMELOG) 7 Unit(s) SubCutaneous three times a day before meals  insulin regular Infusion 1 Unit(s)/Hr (1 mL/Hr) IV Continuous <Continuous>  ipratropium    for Nebulization 500 MICROGram(s) Nebulizer every 6 hours  metoprolol tartrate 25 milliGRAM(s) Oral two times a day  ondansetron Injectable 4 milliGRAM(s) IV Push once  pantoprazole    Tablet 40 milliGRAM(s) Oral before breakfast  polyethylene glycol 3350 17 Gram(s) Oral daily  potassium chloride    Tablet ER 40 milliEquivalent(s) Oral daily  senna 2 Tablet(s) Oral at bedtime  sodium chloride 0.9%. 1000 milliLiter(s) (20 mL/Hr) IV Continuous <Continuous>  tamsulosin 0.4 milliGRAM(s) Oral at bedtime    MEDICATIONS  (PRN):  dextrose Oral Gel 15 Gram(s) Oral once PRN Blood Glucose LESS THAN 70 milliGRAM(s)/deciliter  melatonin 5 milliGRAM(s) Oral at bedtime PRN Insomnia  oxyCODONE    IR 10 milliGRAM(s) Oral every 4 hours PRN Severe Pain (7 - 10)  oxyCODONE    IR 5 milliGRAM(s) Oral every 4 hours PRN Moderate Pain (4 - 6)    HEPARIN:  [x] YES [] NO  Dose: 5000 UNITS Q12H  SCD's: YES b/l  GI Prophylaxis: Protonix [x], Pepcid [], None [], (Contra-indication:.....)    Post-Op Beta-Blockers: Yes [x], No[], If No, then contraindication:  Post-Op Aspirin: Yes [x],  No [], If No, then contraindication:  Post-Op Statin: Yes [x], No[], If No, then contraindication:      Allergies    No Known Allergies    Intolerances      Ambulation/Activity Status: ambulate     Assessment/Plan:  84y Male status-post CABGx4 POD#5  - Case and plan discussed with CTU Intensivist and CT Surgeon - Dr. Portillo/Pedro Pablo/Bobbi  - Continue CTU supportive care    - Continue DVT/GI prophylaxis  - Incentive Spirometry 10 times an hour  - Continue to advance physical activity as tolerated and continue PT/OT as directed  1. CAD: Continue ASA, statin, BB, pain control prn, d/c wires. d/c cordis   2. HTN: cont metoprolol, cont to monitor  3. A. Fib prophylaxis: bb, cont to monitor electrolytes   4. Post-op Hypoxia: cont nebs, wean o2 as tolerated, encourage incentive spirometry   5. DM/Glucose Control: pt has HAGMA/DKA POD#1- closed anion gap now was on insulin gtt, cont lantus 25 and humalog 7 with sliding scale   6. pericarditis- cont colchicine     Social Service Disposition:  home OPERATIVE PROCEDURE(s):        CABGx4            POD #     5                  84yMale  SURGEON(s): GINA Chamorro  SUBJECTIVE ASSESSMENT: pt seen and examined. no acute complaints   Vital Signs Last 24 Hrs  T(F): 98.2 (16 Oct 2024 08:00), Max: 98.2 (16 Oct 2024 08:00)  HR: 68 (16 Oct 2024 08:45) (59 - 75)  BP: 116/60 (16 Oct 2024 08:45) (105/57 - 154/70)  BP(mean): 82 (16 Oct 2024 08:45) (78 - 101)  RR: 21 (16 Oct 2024 08:45) (14 - 31)  SpO2: 94% (16 Oct 2024 08:45) (91% - 98%)      I&O's Detail    15 Oct 2024 07:01  -  16 Oct 2024 07:00  --------------------------------------------------------  IN:    IV PiggyBack: 100 mL    Oral Fluid: 380 mL  Total IN: 480 mL    OUT:    Voided (mL): 670 mL  Total OUT: 670 mL        Net:   I&O's Detail    14 Oct 2024 07:01  -  15 Oct 2024 07:00  --------------------------------------------------------  Total NET: 88.3 mL      15 Oct 2024 07:01  -  16 Oct 2024 07:00  --------------------------------------------------------  Total NET: -190 mL        CAPILLARY BLOOD GLUCOSE      POCT Blood Glucose.: 92 mg/dL (16 Oct 2024 06:57)  POCT Blood Glucose.: 108 mg/dL (15 Oct 2024 22:12)  POCT Blood Glucose.: 93 mg/dL (15 Oct 2024 16:20)  POCT Blood Glucose.: 186 mg/dL (15 Oct 2024 11:24)    Physical Exam:  General: NAD; A&Ox3, no focal deficits clear speech  Cardiac: S1/S2, RRR, no murmur, no rubs  Lungs: unlabored respirations, CTA b/l, no wheeze, no rales, no crackles  Abdomen: Soft/NT/ND; positive bowel sounds x 4  Sternum: Intact, no click, incision healing well with no drainage  Incisions: Incisions clean/dry/intact  Extremities: mild edema b/l lower extremities; good capillary refill; no cyanosis; palpable 1+ pedal pulses b/l    Central Venous Catheter: Yes[x]  No[] , If Yes indication:    critical       Day #5  EPICARDIAL WIRES:  [x] YES [] NO                                              Day #5  BOWEL MOVEMENT:  [x] YES [] NO, If No, Timing since last BM:      Day #          LABS:                        9.0[L]  11.88[H] )-----------( 293      ( 16 Oct 2024 02:05 )             27.5[L]                        8.5[L]  15.56[H] )-----------( 252      ( 15 Oct 2024 02:00 )             26.2[L]    10-16    140  |  105  |  44[H]  ----------------------------<  85  4.1   |  23  |  1.4  10-15    137  |  103  |  46[H]  ----------------------------<  113[H]  4.0   |  25  |  1.7[H]    Ca    8.2[L]      16 Oct 2024 02:05  Mg     2.1     10-16    TPro  4.9[L] [6.0 - 8.0]  /  Alb  3.1[L] [3.5 - 5.2]  /  TBili  0.6 [0.2 - 1.2]  /  DBili  x   /  AST  42[H] [0 - 41]  /  ALT  45[H] [0 - 41]  /  AlkPhos  96 [30 - 115]  10-16      Urinalysis Basic - ( 16 Oct 2024 02:05 )    Color: x / Appearance: x / SG: x / pH: x  Gluc: 85 mg/dL / Ketone: x  / Bili: x / Urobili: x   Blood: x / Protein: x / Nitrite: x   Leuk Esterase: x / RBC: x / WBC x   Sq Epi: x / Non Sq Epi: x / Bacteria: x        RADIOLOGY & ADDITIONAL TESTS:  CXR: < from: Xray Chest 1 View- PORTABLE-Routine (Xray Chest 1 View- PORTABLE-Routine in AM.) (10.15.24 @ 05:06) >  IMPRESSION:    1. Unchanged patchy bilateral opacities/pleural effusions.    < end of copied text >    EKG: < from: 12 Lead ECG (10.15.24 @ 07:13) >    Ventricular Rate 59 BPM    Atrial Rate 59 BPM    P-R Interval 148 ms    QRS Duration 104 ms    Q-T Interval 464 ms    QTC Calculation(Bazett) 459 ms    P Axis 49 degrees    R Axis 14 degrees    T Axis 19 degrees    Diagnosis Line Sinus bradycardia  Cannot rule out Anterior infarct , age undetermined  Abnormal ECG    < end of copied text >    MEDICATIONS  (STANDING):  acetaminophen   IVPB .. 1000 milliGRAM(s) IV Intermittent once  allopurinol 100 milliGRAM(s) Oral daily  aMIOdarone    Tablet 200 milliGRAM(s) Oral daily  aMIOdarone Infusion 0.5 mG/Min (16.7 mL/Hr) IV Continuous <Continuous>  aspirin enteric coated 325 milliGRAM(s) Oral daily  bisacodyl 5 milliGRAM(s) Oral every 12 hours  chlorhexidine 2% Cloths 1 Application(s) Topical daily  colchicine 0.6 milliGRAM(s) Oral two times a day  dextrose 5%. 1000 milliLiter(s) (100 mL/Hr) IV Continuous <Continuous>  dextrose 5%. 1000 milliLiter(s) (50 mL/Hr) IV Continuous <Continuous>  dextrose 50% Injectable 50 milliLiter(s) IV Push every 15 minutes  ferrous    sulfate 325 milliGRAM(s) Oral daily  folic acid 1 milliGRAM(s) Oral daily  glucagon  Injectable 1 milliGRAM(s) IntraMuscular once  heparin   Injectable 5000 Unit(s) SubCutaneous every 12 hours  insulin glargine Injectable (LANTUS) 25 Unit(s) SubCutaneous every morning  insulin lispro (ADMELOG) corrective regimen sliding scale   SubCutaneous three times a day before meals  insulin lispro (ADMELOG) corrective regimen sliding scale   SubCutaneous at bedtime  insulin lispro Injectable (ADMELOG) 7 Unit(s) SubCutaneous three times a day before meals  insulin regular Infusion 1 Unit(s)/Hr (1 mL/Hr) IV Continuous <Continuous>  ipratropium    for Nebulization 500 MICROGram(s) Nebulizer every 6 hours  metoprolol tartrate 25 milliGRAM(s) Oral two times a day  ondansetron Injectable 4 milliGRAM(s) IV Push once  pantoprazole    Tablet 40 milliGRAM(s) Oral before breakfast  polyethylene glycol 3350 17 Gram(s) Oral daily  potassium chloride    Tablet ER 40 milliEquivalent(s) Oral daily  senna 2 Tablet(s) Oral at bedtime  sodium chloride 0.9%. 1000 milliLiter(s) (20 mL/Hr) IV Continuous <Continuous>  tamsulosin 0.4 milliGRAM(s) Oral at bedtime    MEDICATIONS  (PRN):  dextrose Oral Gel 15 Gram(s) Oral once PRN Blood Glucose LESS THAN 70 milliGRAM(s)/deciliter  melatonin 5 milliGRAM(s) Oral at bedtime PRN Insomnia  oxyCODONE    IR 10 milliGRAM(s) Oral every 4 hours PRN Severe Pain (7 - 10)  oxyCODONE    IR 5 milliGRAM(s) Oral every 4 hours PRN Moderate Pain (4 - 6)    HEPARIN:  [x] YES [] NO  Dose: 5000 UNITS Q12H  SCD's: YES b/l  GI Prophylaxis: Protonix [x], Pepcid [], None [], (Contra-indication:.....)    Post-Op Beta-Blockers: Yes [x], No[], If No, then contraindication:  Post-Op Aspirin: Yes [x],  No [], If No, then contraindication:  Post-Op Statin: Yes [x], No[], If No, then contraindication:      Allergies    No Known Allergies    Intolerances      Ambulation/Activity Status: ambulate     Assessment/Plan:  84y Male status-post CABGx4 POD#5  - Case and plan discussed with CTU Intensivist and CT Surgeon - Dr. Portillo/Pedro Pablo/Bobbi  - Continue CTU supportive care    - Continue DVT/GI prophylaxis  - Incentive Spirometry 10 times an hour  - Continue to advance physical activity as tolerated and continue PT/OT as directed  1. CAD: Continue ASA, statin, BB, pain control prn, d/c wires. d/c cordis   2. HTN: cont metoprolol, cont to monitor  3. A. Fib: bb, cont to monitor electrolytes, amio 200mg daily   4. Post-op Hypoxia: cont nebs, wean o2 as tolerated, encourage incentive spirometry   5. DM/Glucose Control: pt has HAGMA/DKA POD#1- closed anion gap now was on insulin gtt, cont lantus 25 and humalog 7 with sliding scale   6. pericarditis- cont colchicine     Social Service Disposition:  home OPERATIVE PROCEDURE(s):        CABGx4            POD #     5                  84yMale  SURGEON(s): GINA Chamorro  SUBJECTIVE ASSESSMENT: pt seen and examined. no acute complaints   Vital Signs Last 24 Hrs  T(F): 98.2 (16 Oct 2024 08:00), Max: 98.2 (16 Oct 2024 08:00)  HR: 68 (16 Oct 2024 08:45) (59 - 75)  BP: 116/60 (16 Oct 2024 08:45) (105/57 - 154/70)  BP(mean): 82 (16 Oct 2024 08:45) (78 - 101)  RR: 21 (16 Oct 2024 08:45) (14 - 31)  SpO2: 94% (16 Oct 2024 08:45) (91% - 98%)      I&O's Detail    15 Oct 2024 07:01  -  16 Oct 2024 07:00  --------------------------------------------------------  IN:    IV PiggyBack: 100 mL    Oral Fluid: 380 mL  Total IN: 480 mL    OUT:    Voided (mL): 670 mL  Total OUT: 670 mL        Net:   I&O's Detail    14 Oct 2024 07:01  -  15 Oct 2024 07:00  --------------------------------------------------------  Total NET: 88.3 mL      15 Oct 2024 07:01  -  16 Oct 2024 07:00  --------------------------------------------------------  Total NET: -190 mL        CAPILLARY BLOOD GLUCOSE      POCT Blood Glucose.: 92 mg/dL (16 Oct 2024 06:57)  POCT Blood Glucose.: 108 mg/dL (15 Oct 2024 22:12)  POCT Blood Glucose.: 93 mg/dL (15 Oct 2024 16:20)  POCT Blood Glucose.: 186 mg/dL (15 Oct 2024 11:24)    Physical Exam:  General: NAD; A&Ox3, no focal deficits clear speech  Cardiac: S1/S2, RRR, no murmur, no rubs  Lungs: unlabored respirations, CTA b/l, no wheeze, no rales, no crackles  Abdomen: Soft/NT/ND; positive bowel sounds x 4  Sternum: Intact, no click, incision healing well with no drainage  Incisions: Incisions clean/dry/intact  Extremities: mild edema b/l lower extremities; good capillary refill; no cyanosis; palpable 1+ pedal pulses b/l    Central Venous Catheter: Yes[x]  No[] , If Yes indication:    critical       Day #5  EPICARDIAL WIRES:  [x] YES [] NO                                              Day #5  BOWEL MOVEMENT:  [x] YES [] NO, If No, Timing since last BM:      Day #          LABS:                        9.0[L]  11.88[H] )-----------( 293      ( 16 Oct 2024 02:05 )             27.5[L]                        8.5[L]  15.56[H] )-----------( 252      ( 15 Oct 2024 02:00 )             26.2[L]    10-16    140  |  105  |  44[H]  ----------------------------<  85  4.1   |  23  |  1.4  10-15    137  |  103  |  46[H]  ----------------------------<  113[H]  4.0   |  25  |  1.7[H]    Ca    8.2[L]      16 Oct 2024 02:05  Mg     2.1     10-16    TPro  4.9[L] [6.0 - 8.0]  /  Alb  3.1[L] [3.5 - 5.2]  /  TBili  0.6 [0.2 - 1.2]  /  DBili  x   /  AST  42[H] [0 - 41]  /  ALT  45[H] [0 - 41]  /  AlkPhos  96 [30 - 115]  10-16      Urinalysis Basic - ( 16 Oct 2024 02:05 )    Color: x / Appearance: x / SG: x / pH: x  Gluc: 85 mg/dL / Ketone: x  / Bili: x / Urobili: x   Blood: x / Protein: x / Nitrite: x   Leuk Esterase: x / RBC: x / WBC x   Sq Epi: x / Non Sq Epi: x / Bacteria: x        RADIOLOGY & ADDITIONAL TESTS:  CXR: < from: Xray Chest 1 View- PORTABLE-Routine (Xray Chest 1 View- PORTABLE-Routine in AM.) (10.15.24 @ 05:06) >  IMPRESSION:    1. Unchanged patchy bilateral opacities/pleural effusions.    < end of copied text >    EKG: < from: 12 Lead ECG (10.15.24 @ 07:13) >    Ventricular Rate 59 BPM    Atrial Rate 59 BPM    P-R Interval 148 ms    QRS Duration 104 ms    Q-T Interval 464 ms    QTC Calculation(Bazett) 459 ms    P Axis 49 degrees    R Axis 14 degrees    T Axis 19 degrees    Diagnosis Line Sinus bradycardia  Cannot rule out Anterior infarct , age undetermined  Abnormal ECG    < end of copied text >    MEDICATIONS  (STANDING):  acetaminophen   IVPB .. 1000 milliGRAM(s) IV Intermittent once  allopurinol 100 milliGRAM(s) Oral daily  aMIOdarone    Tablet 200 milliGRAM(s) Oral daily  aMIOdarone Infusion 0.5 mG/Min (16.7 mL/Hr) IV Continuous <Continuous>  aspirin enteric coated 325 milliGRAM(s) Oral daily  bisacodyl 5 milliGRAM(s) Oral every 12 hours  chlorhexidine 2% Cloths 1 Application(s) Topical daily  colchicine 0.6 milliGRAM(s) Oral two times a day  dextrose 5%. 1000 milliLiter(s) (100 mL/Hr) IV Continuous <Continuous>  dextrose 5%. 1000 milliLiter(s) (50 mL/Hr) IV Continuous <Continuous>  dextrose 50% Injectable 50 milliLiter(s) IV Push every 15 minutes  ferrous    sulfate 325 milliGRAM(s) Oral daily  folic acid 1 milliGRAM(s) Oral daily  glucagon  Injectable 1 milliGRAM(s) IntraMuscular once  heparin   Injectable 5000 Unit(s) SubCutaneous every 12 hours  insulin glargine Injectable (LANTUS) 25 Unit(s) SubCutaneous every morning  insulin lispro (ADMELOG) corrective regimen sliding scale   SubCutaneous three times a day before meals  insulin lispro (ADMELOG) corrective regimen sliding scale   SubCutaneous at bedtime  insulin lispro Injectable (ADMELOG) 7 Unit(s) SubCutaneous three times a day before meals  insulin regular Infusion 1 Unit(s)/Hr (1 mL/Hr) IV Continuous <Continuous>  ipratropium    for Nebulization 500 MICROGram(s) Nebulizer every 6 hours  metoprolol tartrate 25 milliGRAM(s) Oral two times a day  ondansetron Injectable 4 milliGRAM(s) IV Push once  pantoprazole    Tablet 40 milliGRAM(s) Oral before breakfast  polyethylene glycol 3350 17 Gram(s) Oral daily  potassium chloride    Tablet ER 40 milliEquivalent(s) Oral daily  senna 2 Tablet(s) Oral at bedtime  sodium chloride 0.9%. 1000 milliLiter(s) (20 mL/Hr) IV Continuous <Continuous>  tamsulosin 0.4 milliGRAM(s) Oral at bedtime    MEDICATIONS  (PRN):  dextrose Oral Gel 15 Gram(s) Oral once PRN Blood Glucose LESS THAN 70 milliGRAM(s)/deciliter  melatonin 5 milliGRAM(s) Oral at bedtime PRN Insomnia  oxyCODONE    IR 10 milliGRAM(s) Oral every 4 hours PRN Severe Pain (7 - 10)  oxyCODONE    IR 5 milliGRAM(s) Oral every 4 hours PRN Moderate Pain (4 - 6)    HEPARIN:  [x] YES [] NO  Dose: 5000 UNITS Q12H  SCD's: YES b/l  GI Prophylaxis: Protonix [x], Pepcid [], None [], (Contra-indication:.....)    Post-Op Beta-Blockers: Yes [x], No[], If No, then contraindication:  Post-Op Aspirin: Yes [x],  No [], If No, then contraindication:  Post-Op Statin: Yes [x], No[], If No, then contraindication:      Allergies    No Known Allergies    Intolerances      Ambulation/Activity Status: ambulate     Assessment/Plan:  84y Male status-post CABGx4 POD#5  - Case and plan discussed with CTU Intensivist and CT Surgeon - Dr. Portillo/Pedro Pablo/Bobbi  - Continue CTU supportive care    - Continue DVT/GI prophylaxis  - Incentive Spirometry 10 times an hour  - Continue to advance physical activity as tolerated and continue PT/OT as directed  1. CAD: Continue ASA, statin, BB, pain control prn, d/c wires. d/c cordis   2. HTN: cont metoprolol, cont to monitor  3. A. Fib: bb, cont to monitor electrolytes, amio 200mg daily   4. Post-op Hypoxia: cont nebs, wean o2 as tolerated, encourage incentive spirometry, lasix for noncardiogenic fluid overload   5. DM/Glucose Control: pt has HAGMA/DKA POD#1- closed anion gap now was on insulin gtt, cont lantus 25 and humalog 7 with sliding scale   6. pericarditis- cont colchicine     Social Service Disposition:  home

## 2024-10-16 NOTE — CHART NOTE - NSCHARTNOTEFT_GEN_A_CORE
Brief Nutrition Note    Pt has a good appetite; consuming >75% of meals provided in-house. Per flow sheet, pt continues to have good appetite; consuming % of meals. Tolerating diet texture/consistency well. No nausea or vomiting reported. Last BM on 10/15.     Low risk; f/u in 7-10 days or prn.     RD to remain available: Samanta Marrero [phone] or TEAMS

## 2024-10-16 NOTE — CHART NOTE - NSCHARTNOTESELECT_GEN_ALL_CORE
Interventional Cardiology/Event Note
RD/Nutrition Services
Transfer Note
LOMN/Event Note
Post Cath Brief Op Note/Event Note

## 2024-10-16 NOTE — PROGRESS NOTE ADULT - PROVIDER SPECIALTY LIST ADULT
CT Surgery
Cardiology
Cardiology
Critical Care
Critical Care
Internal Medicine
CT Surgery
Cardiology
Cardiology
Critical Care
Internal Medicine
Internal Medicine
Critical Care

## 2024-10-17 ENCOUNTER — APPOINTMENT (OUTPATIENT)
Dept: CARE COORDINATION | Facility: HOME HEALTH | Age: 84
End: 2024-10-17
Payer: MEDICARE

## 2024-10-17 DIAGNOSIS — Z95.1 PRESENCE OF AORTOCORONARY BYPASS GRAFT: ICD-10-CM

## 2024-10-17 PROCEDURE — 99024 POSTOP FOLLOW-UP VISIT: CPT

## 2024-10-17 RX ORDER — PANTOPRAZOLE SODIUM 40 MG/1
40 TABLET, DELAYED RELEASE ORAL
Refills: 0 | Status: ACTIVE | COMMUNITY
Start: 2024-10-17

## 2024-10-17 RX ORDER — METOPROLOL TARTRATE 25 MG/1
25 TABLET ORAL
Refills: 0 | Status: ACTIVE | COMMUNITY
Start: 2024-10-17

## 2024-10-17 RX ORDER — SITAGLIPTIN AND METFORMIN HYDROCHLORIDE 50; 500 MG/1; MG/1
50-500 TABLET, FILM COATED ORAL
Refills: 0 | Status: ACTIVE | COMMUNITY
Start: 2024-10-17

## 2024-10-17 RX ORDER — FUROSEMIDE 20 MG/1
20 TABLET ORAL
Refills: 0 | Status: ACTIVE | COMMUNITY
Start: 2024-10-17

## 2024-10-17 RX ORDER — CHLORHEXIDINE GLUCONATE 4 %
325 (65 FE) LIQUID (ML) TOPICAL
Refills: 0 | Status: ACTIVE | COMMUNITY
Start: 2024-10-17

## 2024-10-17 RX ORDER — ATORVASTATIN CALCIUM 40 MG/1
40 TABLET, FILM COATED ORAL
Refills: 0 | Status: ACTIVE | COMMUNITY
Start: 2024-10-17

## 2024-10-17 RX ORDER — CLOPIDOGREL 75 MG/1
75 TABLET, FILM COATED ORAL
Refills: 0 | Status: ACTIVE | COMMUNITY
Start: 2024-10-17

## 2024-10-17 RX ORDER — COLCHICINE 0.6 MG/1
0.6 TABLET ORAL
Refills: 0 | Status: ACTIVE | COMMUNITY
Start: 2024-10-17

## 2024-10-17 RX ORDER — AMIODARONE HYDROCHLORIDE 200 MG/1
200 TABLET ORAL
Refills: 0 | Status: ACTIVE | COMMUNITY
Start: 2024-10-17

## 2024-10-17 RX ORDER — EZETIMIBE 10 MG/1
10 TABLET ORAL
Refills: 0 | Status: ACTIVE | COMMUNITY
Start: 2024-10-17

## 2024-10-17 RX ORDER — ASPIRIN/ACETAMINOPHEN/CAFFEINE 500-325-65
325 POWDER IN PACKET (EA) ORAL
Refills: 0 | Status: ACTIVE | COMMUNITY
Start: 2024-10-17

## 2024-10-17 RX ORDER — DAPAGLIFLOZIN 5 MG/1
5 TABLET, FILM COATED ORAL
Refills: 0 | Status: ACTIVE | COMMUNITY
Start: 2024-10-17

## 2024-10-17 RX ORDER — FOLIC ACID 1 MG/1
1 TABLET ORAL
Refills: 0 | Status: ACTIVE | COMMUNITY
Start: 2024-10-17

## 2024-10-17 RX ORDER — ALLOPURINOL 100 MG/1
100 TABLET ORAL
Refills: 0 | Status: ACTIVE | COMMUNITY
Start: 2024-10-17

## 2024-10-17 RX ORDER — TAMSULOSIN HYDROCHLORIDE 0.4 MG/1
0.4 CAPSULE ORAL
Refills: 0 | Status: ACTIVE | COMMUNITY
Start: 2024-10-17

## 2024-10-18 ENCOUNTER — NON-APPOINTMENT (OUTPATIENT)
Age: 84
End: 2024-10-18

## 2024-10-19 ENCOUNTER — TRANSCRIPTION ENCOUNTER (OUTPATIENT)
Age: 84
End: 2024-10-19

## 2024-10-20 ENCOUNTER — EMERGENCY (EMERGENCY)
Facility: HOSPITAL | Age: 84
LOS: 0 days | Discharge: HOME | End: 2024-10-20
Attending: STUDENT IN AN ORGANIZED HEALTH CARE EDUCATION/TRAINING PROGRAM
Payer: MEDICARE

## 2024-10-20 VITALS
OXYGEN SATURATION: 98 % | WEIGHT: 205.91 LBS | HEIGHT: 69 IN | RESPIRATION RATE: 18 BRPM | DIASTOLIC BLOOD PRESSURE: 72 MMHG | TEMPERATURE: 98 F | SYSTOLIC BLOOD PRESSURE: 124 MMHG | HEART RATE: 70 BPM

## 2024-10-20 DIAGNOSIS — E86.0 DEHYDRATION: ICD-10-CM

## 2024-10-20 DIAGNOSIS — R42 DIZZINESS AND GIDDINESS: ICD-10-CM

## 2024-10-20 DIAGNOSIS — I48.91 UNSPECIFIED ATRIAL FIBRILLATION: ICD-10-CM

## 2024-10-20 DIAGNOSIS — R19.7 DIARRHEA, UNSPECIFIED: ICD-10-CM

## 2024-10-20 DIAGNOSIS — I25.10 ATHEROSCLEROTIC HEART DISEASE OF NATIVE CORONARY ARTERY WITHOUT ANGINA PECTORIS: ICD-10-CM

## 2024-10-20 DIAGNOSIS — Z98.890 OTHER SPECIFIED POSTPROCEDURAL STATES: Chronic | ICD-10-CM

## 2024-10-20 DIAGNOSIS — Z95.1 PRESENCE OF AORTOCORONARY BYPASS GRAFT: ICD-10-CM

## 2024-10-20 DIAGNOSIS — I10 ESSENTIAL (PRIMARY) HYPERTENSION: ICD-10-CM

## 2024-10-20 LAB
ALBUMIN SERPL ELPH-MCNC: 3.3 G/DL — LOW (ref 3.5–5.2)
ALP SERPL-CCNC: 109 U/L — SIGNIFICANT CHANGE UP (ref 30–115)
ALT FLD-CCNC: 42 U/L — HIGH (ref 0–41)
ANION GAP SERPL CALC-SCNC: 12 MMOL/L — SIGNIFICANT CHANGE UP (ref 7–14)
APPEARANCE UR: CLEAR — SIGNIFICANT CHANGE UP
AST SERPL-CCNC: 42 U/L — HIGH (ref 0–41)
BACTERIA # UR AUTO: NEGATIVE /HPF — SIGNIFICANT CHANGE UP
BASOPHILS # BLD AUTO: 0.03 K/UL — SIGNIFICANT CHANGE UP (ref 0–0.2)
BASOPHILS NFR BLD AUTO: 0.4 % — SIGNIFICANT CHANGE UP (ref 0–1)
BILIRUB SERPL-MCNC: 0.6 MG/DL — SIGNIFICANT CHANGE UP (ref 0.2–1.2)
BILIRUB UR-MCNC: NEGATIVE — SIGNIFICANT CHANGE UP
BUN SERPL-MCNC: 25 MG/DL — HIGH (ref 10–20)
C DIFF GDH STL QL: SIGNIFICANT CHANGE UP
C DIFF GDH STL QL: SIGNIFICANT CHANGE UP
CALCIUM SERPL-MCNC: 8.6 MG/DL — SIGNIFICANT CHANGE UP (ref 8.4–10.5)
CAST: 3 /LPF — SIGNIFICANT CHANGE UP (ref 0–4)
CHLORIDE SERPL-SCNC: 103 MMOL/L — SIGNIFICANT CHANGE UP (ref 98–110)
CO2 SERPL-SCNC: 24 MMOL/L — SIGNIFICANT CHANGE UP (ref 17–32)
COLOR SPEC: YELLOW — SIGNIFICANT CHANGE UP
CREAT SERPL-MCNC: 1.2 MG/DL — SIGNIFICANT CHANGE UP (ref 0.7–1.5)
DIFF PNL FLD: ABNORMAL
EGFR: 60 ML/MIN/1.73M2 — SIGNIFICANT CHANGE UP
EOSINOPHIL # BLD AUTO: 0.09 K/UL — SIGNIFICANT CHANGE UP (ref 0–0.7)
EOSINOPHIL NFR BLD AUTO: 1.1 % — SIGNIFICANT CHANGE UP (ref 0–8)
GLUCOSE SERPL-MCNC: 121 MG/DL — HIGH (ref 70–99)
GLUCOSE UR QL: 100 MG/DL
HCT VFR BLD CALC: 30.9 % — LOW (ref 42–52)
HGB BLD-MCNC: 9.9 G/DL — LOW (ref 14–18)
IMM GRANULOCYTES NFR BLD AUTO: 1.1 % — HIGH (ref 0.1–0.3)
KETONES UR-MCNC: ABNORMAL MG/DL
LEUKOCYTE ESTERASE UR-ACNC: NEGATIVE — SIGNIFICANT CHANGE UP
LYMPHOCYTES # BLD AUTO: 0.89 K/UL — LOW (ref 1.2–3.4)
LYMPHOCYTES # BLD AUTO: 10.7 % — LOW (ref 20.5–51.1)
MAGNESIUM SERPL-MCNC: 1.7 MG/DL — LOW (ref 1.8–2.4)
MCHC RBC-ENTMCNC: 30.7 PG — SIGNIFICANT CHANGE UP (ref 27–31)
MCHC RBC-ENTMCNC: 32 G/DL — SIGNIFICANT CHANGE UP (ref 32–37)
MCV RBC AUTO: 96 FL — HIGH (ref 80–94)
MONOCYTES # BLD AUTO: 0.72 K/UL — HIGH (ref 0.1–0.6)
MONOCYTES NFR BLD AUTO: 8.7 % — SIGNIFICANT CHANGE UP (ref 1.7–9.3)
NEUTROPHILS # BLD AUTO: 6.48 K/UL — SIGNIFICANT CHANGE UP (ref 1.4–6.5)
NEUTROPHILS NFR BLD AUTO: 78 % — HIGH (ref 42.2–75.2)
NITRITE UR-MCNC: NEGATIVE — SIGNIFICANT CHANGE UP
NRBC # BLD: 0 /100 WBCS — SIGNIFICANT CHANGE UP (ref 0–0)
PH UR: 5.5 — SIGNIFICANT CHANGE UP (ref 5–8)
PLATELET # BLD AUTO: 376 K/UL — SIGNIFICANT CHANGE UP (ref 130–400)
PMV BLD: 9.5 FL — SIGNIFICANT CHANGE UP (ref 7.4–10.4)
POTASSIUM SERPL-MCNC: 4.6 MMOL/L — SIGNIFICANT CHANGE UP (ref 3.5–5)
POTASSIUM SERPL-SCNC: 4.6 MMOL/L — SIGNIFICANT CHANGE UP (ref 3.5–5)
PROT SERPL-MCNC: 5.6 G/DL — LOW (ref 6–8)
PROT UR-MCNC: 30 MG/DL
RBC # BLD: 3.22 M/UL — LOW (ref 4.7–6.1)
RBC # FLD: 13.3 % — SIGNIFICANT CHANGE UP (ref 11.5–14.5)
RBC CASTS # UR COMP ASSIST: 17 /HPF — HIGH (ref 0–4)
SODIUM SERPL-SCNC: 139 MMOL/L — SIGNIFICANT CHANGE UP (ref 135–146)
SP GR SPEC: 1.02 — SIGNIFICANT CHANGE UP (ref 1–1.03)
SQUAMOUS # UR AUTO: 2 /HPF — SIGNIFICANT CHANGE UP (ref 0–5)
UROBILINOGEN FLD QL: 0.2 MG/DL — SIGNIFICANT CHANGE UP (ref 0.2–1)
WBC # BLD: 8.3 K/UL — SIGNIFICANT CHANGE UP (ref 4.8–10.8)
WBC # FLD AUTO: 8.3 K/UL — SIGNIFICANT CHANGE UP (ref 4.8–10.8)
WBC UR QL: 0 /HPF — SIGNIFICANT CHANGE UP (ref 0–5)

## 2024-10-20 PROCEDURE — 93010 ELECTROCARDIOGRAM REPORT: CPT | Mod: 76

## 2024-10-20 PROCEDURE — 81001 URINALYSIS AUTO W/SCOPE: CPT

## 2024-10-20 PROCEDURE — 93010 ELECTROCARDIOGRAM REPORT: CPT

## 2024-10-20 PROCEDURE — 93005 ELECTROCARDIOGRAM TRACING: CPT

## 2024-10-20 PROCEDURE — 99285 EMERGENCY DEPT VISIT HI MDM: CPT | Mod: FS

## 2024-10-20 PROCEDURE — 80053 COMPREHEN METABOLIC PANEL: CPT

## 2024-10-20 PROCEDURE — 71045 X-RAY EXAM CHEST 1 VIEW: CPT | Mod: 26

## 2024-10-20 PROCEDURE — 36415 COLL VENOUS BLD VENIPUNCTURE: CPT

## 2024-10-20 PROCEDURE — 99285 EMERGENCY DEPT VISIT HI MDM: CPT | Mod: 25

## 2024-10-20 PROCEDURE — 71045 X-RAY EXAM CHEST 1 VIEW: CPT

## 2024-10-20 PROCEDURE — 87493 C DIFF AMPLIFIED PROBE: CPT

## 2024-10-20 PROCEDURE — 87449 NOS EACH ORGANISM AG IA: CPT

## 2024-10-20 PROCEDURE — 83735 ASSAY OF MAGNESIUM: CPT

## 2024-10-20 PROCEDURE — 85025 COMPLETE CBC W/AUTO DIFF WBC: CPT

## 2024-10-20 PROCEDURE — 87324 CLOSTRIDIUM AG IA: CPT | Mod: XU

## 2024-10-20 RX ORDER — SODIUM CHLORIDE IRRIG SOLUTION 0.9 %
1000 SOLUTION, IRRIGATION IRRIGATION ONCE
Refills: 0 | Status: DISCONTINUED | OUTPATIENT
Start: 2024-10-20 | End: 2024-10-20

## 2024-10-20 NOTE — ED PROVIDER NOTE - PATIENT PORTAL LINK FT
You can access the FollowMyHealth Patient Portal offered by [facility] by registering at the following website: [email or web address]. By joining [facility]’s FollowMyHealth portal, you will also be able to view your health information using other applications (apps) compatible with our system.

## 2024-10-20 NOTE — ED PROVIDER NOTE - CLINICAL SUMMARY MEDICAL DECISION MAKING FREE TEXT BOX
.    84-year-old male, PMH HTN, A-fib, CAD status post CABG, POD #9, p/w lightheadedness this morning.  No syncope, chest pain or shortness of breath.  Symptoms occurred while transferring to standing, and has resolved.  Patient endorses watery stool that began during admission, and then continued after his discharge on 10/16.  Patient also endorses not eating or drinking as much during admission, and then also afterwards while at home–food and liquids simply did not taste good.  No fever, cough, urinary complaints abdominal pain or other complaints.    Exam as noted above, + very dry mucous membranes, well-healing chest and leg surgical scars, CTAB, RRR, abdomen soft nontender.    Differential diagnosis includes but not limited to dehydration, arrhythmia, electrode abnormality, anemia.    Additional information taken from chart review and from discussion with family at the bedside confirming HPI.    All available lab tests, imaging tests, and EKGs independently reviewed and interpreted by meRah.  Hemoglobin 9.9, elevated BUN, prerenal azotemia  Chest x-ray shows no pneumothorax, focal consolidation or free air.  EKG is normal sinus, rate 78, no STEMI, morphology similar to prior.    Pt seen by CT surgery, recc IV hydration. Do not think sx are from cardiac/ thoracic etiology.    Patient felt much better after IV fluid.    IMP: Dehydration, diarrhea.  Patient stable for discharge with continued outpatient follow-up, recommendations for hydration and rest, as well as eating.  Patient and family understand signs and symptoms for ED return.  DC home.

## 2024-10-20 NOTE — ED PROVIDER NOTE - IV ALTEPLASE INCLUSION HIDDEN
Pastoral Care Progress Note    2021  Patient: Hayder Weiss : 1966  Admission Date & Time: 2021 1739  MRN: [id number] [facility]: [id number]         21 1000   Clinical Encounter Type   Visited With Patient   Routine Visit Introduction      visited with patient while on rounds  Inquired about how patient was feeling after recently being admitted  Patient expressed that he was a little worried, but hopes the medical team will know more about his situation after running some tests today  Patient expressed that he feels supported   informed patient of  services and availability  Patient expressed gratitude for visit                 Chaplaincy Interventions Utilized:   Empowerment: Normalized experience of patient/family and Provided chaplaincy education    Exploration: Explored relational needs & resources and Explored spiritual needs & resources    Relationship Building: Cultivated a relationship of care and support    Chaplaincy Outcomes Achieved:  Expressed gratitude and Identified meaningful connections     Spiritual Coping Strategies Utilized:   Connectedness show

## 2024-10-20 NOTE — ED CLERICAL - NS ED CLERK NOTE PRE-ARRIVAL INFORMATION; ADDITIONAL PRE-ARRIVAL INFORMATION
This patient is enrolled in a readmission reduction program and has active care navigation. This patient can be followed up by the care navigation team within 24 hours. To arrange close follow-up or to obtain additional clinical information about this patient, please call the contact number above. Please speak with the [facility] ED Case Manager for assistance with discharge planning

## 2024-10-20 NOTE — ED PROVIDER NOTE - OBJECTIVE STATEMENT
84-year-old male CAD s/p CABG x4 POD # 9, hypertension, A-fib presenting for evaluation of an episode of lightheadedness that occurred this morning.  Patient reports having watery stools ever since he was discharged on 10/16.  Patient also reports that he has not been drinking and eating since he has been discharged.  Has no abdominal pain, chest pain, shortness of breath, vomiting, paresthesias, or headache.

## 2024-10-20 NOTE — ED PROVIDER NOTE - PHYSICAL EXAMINATION
VITAL SIGNS: I have reviewed nursing notes and confirm.  CONSTITUTIONAL: Patient is in no acute distress.  SKIN: Skin exam is warm and dry, no acute rash. Incision clean, dry, and intact.   HEAD: Normocephalic; atraumatic.  EYES: PERRL, EOM intact; conjunctiva and sclera clear.  ENT: No nasal discharge; airway clear. Dry mucous membranes.   NECK: Supple; non tender.  CARD: S1, S2 normal; no murmurs, gallops, or rubs. Regular rate and rhythm.  RESP: Clear to auscultation bilaterally. No wheezes, rales or rhonchi.  ABD: Normal bowel sounds; soft; non-distended; non-tender.   EXT: Normal ROM. No edema.  LYMPH: No acute cervical adenopathy.  NEURO: Alert, oriented. Grossly unremarkable. No focal deficits.  PSYCH: Cooperative, appropriate.

## 2024-10-20 NOTE — ED ADULT NURSE NOTE - NSFALLHARMRISKINTERV_ED_ALL_ED

## 2024-10-20 NOTE — ED PROVIDER NOTE - CARE PROVIDER_API CALL
Mitchell Chamorro  Thoracic and Cardiac Surgery  [address] [place] [address], [address]  [place] [address]  Phone: [phone]  Fax: [phone]  Follow Up Time: 1-3 Days

## 2024-10-20 NOTE — ED PROVIDER NOTE - IV ALTEPLASE EXCL ABS HIDDEN
Prior to the start of the PICC procedure and with procedural staff participation, I verbally confirmed the patient s identity using two indicators, relevant allergies, that the procedure was appropriate and matched the consent or emergent situation, and that the correct equipment/implants were available. Immediately prior to starting the procedure I conducted the Time Out with the procedural staff and re-confirmed the patient s name, procedure, and site/side. (The Joint Commission universal protocol was followed.)  Yes    Sedation (Moderate or Deep): None  With Janny Theodore RN     show

## 2024-10-20 NOTE — ED PROVIDER NOTE - CARE PROVIDERS DIRECT ADDRESSES
[email or web address]

## 2024-10-20 NOTE — ED PROVIDER NOTE - PROGRESS NOTE DETAILS
Patient is feeling much better symptoms have improved.  Discussed strict return precautions with patient and family bedside.  Patient was seen by CT surgery and recommended hydration and discharge with outpatient follow-up.

## 2024-10-22 ENCOUNTER — NON-APPOINTMENT (OUTPATIENT)
Age: 84
End: 2024-10-22

## 2024-10-23 ENCOUNTER — APPOINTMENT (OUTPATIENT)
Dept: CARDIOTHORACIC SURGERY | Facility: CLINIC | Age: 84
End: 2024-10-23
Payer: MEDICARE

## 2024-10-23 ENCOUNTER — LABORATORY RESULT (OUTPATIENT)
Age: 84
End: 2024-10-23

## 2024-10-23 VITALS
RESPIRATION RATE: 14 BRPM | HEIGHT: 69 IN | HEART RATE: 82 BPM | BODY MASS INDEX: 29.52 KG/M2 | SYSTOLIC BLOOD PRESSURE: 105 MMHG | OXYGEN SATURATION: 79 % | WEIGHT: 199.31 LBS | DIASTOLIC BLOOD PRESSURE: 68 MMHG

## 2024-10-23 DIAGNOSIS — I21.A1 MYOCARDIAL INFARCTION TYPE 2: ICD-10-CM

## 2024-10-23 DIAGNOSIS — E78.5 HYPERLIPIDEMIA, UNSPECIFIED: ICD-10-CM

## 2024-10-23 DIAGNOSIS — M25.512 PAIN IN LEFT SHOULDER: ICD-10-CM

## 2024-10-23 DIAGNOSIS — D62 ACUTE POSTHEMORRHAGIC ANEMIA: ICD-10-CM

## 2024-10-23 DIAGNOSIS — I25.10 ATHEROSCLEROTIC HEART DISEASE OF NATIVE CORONARY ARTERY WITHOUT ANGINA PECTORIS: ICD-10-CM

## 2024-10-23 DIAGNOSIS — N17.0 ACUTE KIDNEY FAILURE WITH TUBULAR NECROSIS: ICD-10-CM

## 2024-10-23 DIAGNOSIS — I95.9 HYPOTENSION, UNSPECIFIED: ICD-10-CM

## 2024-10-23 DIAGNOSIS — E11.9 TYPE 2 DIABETES MELLITUS WITHOUT COMPLICATIONS: ICD-10-CM
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
## 2024-10-23 DIAGNOSIS — I48.91 UNSPECIFIED ATRIAL FIBRILLATION: ICD-10-CM

## 2024-10-23 DIAGNOSIS — R55 SYNCOPE AND COLLAPSE: ICD-10-CM

## 2024-10-23 DIAGNOSIS — M10.9 GOUT, UNSPECIFIED: ICD-10-CM

## 2024-10-23 DIAGNOSIS — E87.70 FLUID OVERLOAD, UNSPECIFIED: ICD-10-CM

## 2024-10-23 DIAGNOSIS — N39.0 URINARY TRACT INFECTION, SITE NOT SPECIFIED: ICD-10-CM

## 2024-10-23 DIAGNOSIS — I10 ESSENTIAL (PRIMARY) HYPERTENSION: ICD-10-CM

## 2024-10-23 DIAGNOSIS — Z79.84 LONG TERM (CURRENT) USE OF ORAL HYPOGLYCEMIC DRUGS: ICD-10-CM

## 2024-10-23 PROCEDURE — 99024 POSTOP FOLLOW-UP VISIT: CPT

## 2024-10-30 ENCOUNTER — APPOINTMENT (OUTPATIENT)
Dept: CARDIOTHORACIC SURGERY | Facility: CLINIC | Age: 84
End: 2024-10-30
Payer: MEDICARE

## 2024-10-30 ENCOUNTER — OUTPATIENT (OUTPATIENT)
Dept: OUTPATIENT SERVICES | Facility: HOSPITAL | Age: 84
LOS: 1 days | End: 2024-10-30
Payer: MEDICARE

## 2024-10-30 ENCOUNTER — RESULT REVIEW (OUTPATIENT)
Age: 84
End: 2024-10-30

## 2024-10-30 VITALS
DIASTOLIC BLOOD PRESSURE: 70 MMHG | RESPIRATION RATE: 14 BRPM | WEIGHT: 197.5 LBS | OXYGEN SATURATION: 96 % | BODY MASS INDEX: 29.17 KG/M2 | TEMPERATURE: 98 F | SYSTOLIC BLOOD PRESSURE: 112 MMHG | HEART RATE: 50 BPM

## 2024-10-30 DIAGNOSIS — R06.02 SHORTNESS OF BREATH: ICD-10-CM

## 2024-10-30 DIAGNOSIS — Z95.1 PRESENCE OF AORTOCORONARY BYPASS GRAFT: ICD-10-CM

## 2024-10-30 PROCEDURE — 71046 X-RAY EXAM CHEST 2 VIEWS: CPT

## 2024-10-30 PROCEDURE — 99024 POSTOP FOLLOW-UP VISIT: CPT

## 2024-10-30 PROCEDURE — 71046 X-RAY EXAM CHEST 2 VIEWS: CPT | Mod: 26

## 2024-10-30 RX ORDER — FUROSEMIDE 20 MG/1
20 TABLET ORAL DAILY
Qty: 14 | Refills: 0 | Status: ACTIVE | COMMUNITY
Start: 2024-10-30 | End: 1900-01-01

## 2024-10-31 DIAGNOSIS — R06.02 SHORTNESS OF BREATH: ICD-10-CM

## 2024-11-13 ENCOUNTER — TRANSCRIPTION ENCOUNTER (OUTPATIENT)
Age: 84
End: 2024-11-13

## 2024-11-13 ENCOUNTER — APPOINTMENT (OUTPATIENT)
Dept: CARDIOTHORACIC SURGERY | Facility: CLINIC | Age: 84
End: 2024-11-13
Payer: MEDICARE

## 2024-11-13 VITALS
OXYGEN SATURATION: 99 % | SYSTOLIC BLOOD PRESSURE: 139 MMHG | HEIGHT: 69 IN | DIASTOLIC BLOOD PRESSURE: 81 MMHG | BODY MASS INDEX: 26.96 KG/M2 | WEIGHT: 182 LBS | HEART RATE: 71 BPM | RESPIRATION RATE: 14 BRPM

## 2024-11-13 DIAGNOSIS — Z95.1 PRESENCE OF AORTOCORONARY BYPASS GRAFT: ICD-10-CM

## 2024-11-13 PROCEDURE — 99024 POSTOP FOLLOW-UP VISIT: CPT

## 2024-12-17 ENCOUNTER — NON-APPOINTMENT (OUTPATIENT)
Age: 84
End: 2024-12-17

## 2024-12-17 ENCOUNTER — APPOINTMENT (OUTPATIENT)
Dept: CARDIOLOGY | Facility: CLINIC | Age: 84
End: 2024-12-17
Payer: MEDICARE

## 2024-12-17 VITALS
SYSTOLIC BLOOD PRESSURE: 142 MMHG | BODY MASS INDEX: 28.58 KG/M2 | HEART RATE: 44 BPM | WEIGHT: 193 LBS | DIASTOLIC BLOOD PRESSURE: 70 MMHG | HEIGHT: 69 IN

## 2024-12-17 DIAGNOSIS — Z95.1 PRESENCE OF AORTOCORONARY BYPASS GRAFT: ICD-10-CM

## 2024-12-17 DIAGNOSIS — I10 ESSENTIAL (PRIMARY) HYPERTENSION: ICD-10-CM

## 2024-12-17 DIAGNOSIS — I25.10 ATHEROSCLEROTIC HEART DISEASE OF NATIVE CORONARY ARTERY W/OUT ANGINA PECTORIS: ICD-10-CM

## 2024-12-17 PROCEDURE — 93000 ELECTROCARDIOGRAM COMPLETE: CPT

## 2024-12-17 PROCEDURE — 99214 OFFICE O/P EST MOD 30 MIN: CPT

## 2024-12-17 RX ORDER — METOPROLOL TARTRATE 75 MG/1
TABLET, FILM COATED ORAL TWICE DAILY
Refills: 0 | Status: ACTIVE | COMMUNITY

## 2024-12-17 RX ORDER — EZETIMIBE 10 MG/1
10 TABLET ORAL DAILY
Refills: 0 | Status: ACTIVE | COMMUNITY

## 2025-03-20 ENCOUNTER — NON-APPOINTMENT (OUTPATIENT)
Age: 85
End: 2025-03-20

## 2025-03-20 ENCOUNTER — APPOINTMENT (OUTPATIENT)
Dept: CARDIOLOGY | Facility: CLINIC | Age: 85
End: 2025-03-20
Payer: MEDICARE

## 2025-03-20 VITALS
BODY MASS INDEX: 28.58 KG/M2 | WEIGHT: 193 LBS | SYSTOLIC BLOOD PRESSURE: 130 MMHG | DIASTOLIC BLOOD PRESSURE: 70 MMHG | HEART RATE: 53 BPM | HEIGHT: 69 IN

## 2025-03-20 DIAGNOSIS — I10 ESSENTIAL (PRIMARY) HYPERTENSION: ICD-10-CM

## 2025-03-20 DIAGNOSIS — I25.10 ATHEROSCLEROTIC HEART DISEASE OF NATIVE CORONARY ARTERY W/OUT ANGINA PECTORIS: ICD-10-CM

## 2025-03-20 DIAGNOSIS — Z95.1 PRESENCE OF AORTOCORONARY BYPASS GRAFT: ICD-10-CM

## 2025-03-20 PROCEDURE — 99214 OFFICE O/P EST MOD 30 MIN: CPT

## 2025-03-20 PROCEDURE — 93000 ELECTROCARDIOGRAM COMPLETE: CPT

## 2025-03-20 RX ORDER — LEVOTHYROXINE SODIUM 0.05 MG/1
50 TABLET ORAL
Refills: 0 | Status: ACTIVE | COMMUNITY

## 2025-05-27 ENCOUNTER — RX RENEWAL (OUTPATIENT)
Age: 85
End: 2025-05-27

## 2025-08-31 ENCOUNTER — EMERGENCY (EMERGENCY)
Facility: HOSPITAL | Age: 85
LOS: 0 days | Discharge: HOME | End: 2025-08-31
Attending: EMERGENCY MEDICINE
Payer: MEDICARE

## 2025-08-31 VITALS
RESPIRATION RATE: 16 BRPM | HEIGHT: 69 IN | TEMPERATURE: 98 F | SYSTOLIC BLOOD PRESSURE: 159 MMHG | OXYGEN SATURATION: 95 % | DIASTOLIC BLOOD PRESSURE: 83 MMHG | HEART RATE: 75 BPM | WEIGHT: 201.72 LBS

## 2025-08-31 DIAGNOSIS — Z23 ENCOUNTER FOR IMMUNIZATION: ICD-10-CM

## 2025-08-31 DIAGNOSIS — Z79.02 LONG TERM (CURRENT) USE OF ANTITHROMBOTICS/ANTIPLATELETS: ICD-10-CM
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
## 2025-08-31 DIAGNOSIS — S81.811A LACERATION WITHOUT FOREIGN BODY, RIGHT LOWER LEG, INITIAL ENCOUNTER: ICD-10-CM

## 2025-08-31 DIAGNOSIS — Z98.890 OTHER SPECIFIED POSTPROCEDURAL STATES: Chronic | ICD-10-CM
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
## 2025-08-31 PROCEDURE — 99284 EMERGENCY DEPT VISIT MOD MDM: CPT | Mod: FS,25

## 2025-08-31 PROCEDURE — 90471 IMMUNIZATION ADMIN: CPT

## 2025-08-31 PROCEDURE — 12031 INTMD RPR S/A/T/EXT 2.5 CM/<: CPT

## 2025-08-31 PROCEDURE — 12001 RPR S/N/AX/GEN/TRNK 2.5CM/<: CPT

## 2025-08-31 PROCEDURE — 90715 TDAP VACCINE 7 YRS/> IM: CPT

## 2025-08-31 PROCEDURE — 99283 EMERGENCY DEPT VISIT LOW MDM: CPT | Mod: 25

## 2025-08-31 RX ORDER — CEPHALEXIN 250 MG/1
1 CAPSULE ORAL
Qty: 28 | Refills: 0
Start: 2025-08-31 | End: 2025-09-06

## 2025-09-04 ENCOUNTER — APPOINTMENT (OUTPATIENT)
Dept: CARDIOLOGY | Facility: CLINIC | Age: 85
End: 2025-09-04
Payer: MEDICARE

## 2025-09-04 VITALS
HEART RATE: 68 BPM | HEIGHT: 69 IN | SYSTOLIC BLOOD PRESSURE: 132 MMHG | DIASTOLIC BLOOD PRESSURE: 70 MMHG | WEIGHT: 193 LBS | BODY MASS INDEX: 28.58 KG/M2

## 2025-09-04 DIAGNOSIS — I10 ESSENTIAL (PRIMARY) HYPERTENSION: ICD-10-CM

## 2025-09-04 DIAGNOSIS — Z95.1 PRESENCE OF AORTOCORONARY BYPASS GRAFT: ICD-10-CM

## 2025-09-04 DIAGNOSIS — I25.10 ATHEROSCLEROTIC HEART DISEASE OF NATIVE CORONARY ARTERY W/OUT ANGINA PECTORIS: ICD-10-CM

## 2025-09-04 PROCEDURE — 99214 OFFICE O/P EST MOD 30 MIN: CPT

## 2025-09-04 PROCEDURE — 93000 ELECTROCARDIOGRAM COMPLETE: CPT

## 2025-09-04 PROCEDURE — G2211 COMPLEX E/M VISIT ADD ON: CPT

## 2025-09-04 RX ORDER — CEPHALEXIN 500 MG/1
500 CAPSULE ORAL
Refills: 0 | Status: ACTIVE | COMMUNITY

## 2025-09-09 ENCOUNTER — EMERGENCY (EMERGENCY)
Facility: HOSPITAL | Age: 85
LOS: 0 days | Discharge: HOME | End: 2025-09-09
Attending: EMERGENCY MEDICINE
Payer: MEDICARE

## 2025-09-09 VITALS
SYSTOLIC BLOOD PRESSURE: 159 MMHG | DIASTOLIC BLOOD PRESSURE: 84 MMHG | TEMPERATURE: 98 F | RESPIRATION RATE: 18 BRPM | HEART RATE: 65 BPM | HEIGHT: 69 IN | OXYGEN SATURATION: 97 %

## 2025-09-09 DIAGNOSIS — Z98.890 OTHER SPECIFIED POSTPROCEDURAL STATES: Chronic | ICD-10-CM

## 2025-09-09 LAB
ALBUMIN SERPL ELPH-MCNC: 4.1 G/DL — SIGNIFICANT CHANGE UP (ref 3.5–5.2)
ALP SERPL-CCNC: 99 U/L — SIGNIFICANT CHANGE UP (ref 30–115)
ALT FLD-CCNC: 34 U/L — SIGNIFICANT CHANGE UP (ref 0–41)
ANION GAP SERPL CALC-SCNC: 14 MMOL/L — SIGNIFICANT CHANGE UP (ref 7–14)
AST SERPL-CCNC: 26 U/L — SIGNIFICANT CHANGE UP (ref 0–41)
BASOPHILS # BLD AUTO: 0.04 K/UL — SIGNIFICANT CHANGE UP (ref 0–0.2)
BASOPHILS NFR BLD AUTO: 0.5 % — SIGNIFICANT CHANGE UP (ref 0–1)
BILIRUB SERPL-MCNC: 0.4 MG/DL — SIGNIFICANT CHANGE UP (ref 0.2–1.2)
BUN SERPL-MCNC: 19 MG/DL — SIGNIFICANT CHANGE UP (ref 10–20)
CALCIUM SERPL-MCNC: 10 MG/DL — SIGNIFICANT CHANGE UP (ref 8.4–10.5)
CHLORIDE SERPL-SCNC: 104 MMOL/L — SIGNIFICANT CHANGE UP (ref 98–110)
CO2 SERPL-SCNC: 25 MMOL/L — SIGNIFICANT CHANGE UP (ref 17–32)
CREAT SERPL-MCNC: 1.3 MG/DL — SIGNIFICANT CHANGE UP (ref 0.7–1.5)
EGFR: 54 ML/MIN/1.73M2 — LOW
EGFR: 54 ML/MIN/1.73M2 — LOW
EOSINOPHIL # BLD AUTO: 0.2 K/UL — SIGNIFICANT CHANGE UP (ref 0–0.7)
EOSINOPHIL NFR BLD AUTO: 2.7 % — SIGNIFICANT CHANGE UP (ref 0–8)
GLUCOSE SERPL-MCNC: 157 MG/DL — HIGH (ref 70–99)
HCT VFR BLD CALC: 38.5 % — LOW (ref 42–52)
HGB BLD-MCNC: 13 G/DL — LOW (ref 14–18)
IMM GRANULOCYTES NFR BLD AUTO: 0.5 % — HIGH (ref 0.1–0.3)
LYMPHOCYTES # BLD AUTO: 1.47 K/UL — SIGNIFICANT CHANGE UP (ref 1.2–3.4)
LYMPHOCYTES # BLD AUTO: 19.6 % — LOW (ref 20.5–51.1)
MCHC RBC-ENTMCNC: 31.8 PG — HIGH (ref 27–31)
MCHC RBC-ENTMCNC: 33.8 G/DL — SIGNIFICANT CHANGE UP (ref 32–37)
MCV RBC AUTO: 94.1 FL — HIGH (ref 80–94)
MONOCYTES # BLD AUTO: 0.68 K/UL — HIGH (ref 0.1–0.6)
MONOCYTES NFR BLD AUTO: 9.1 % — SIGNIFICANT CHANGE UP (ref 1.7–9.3)
NEUTROPHILS # BLD AUTO: 5.08 K/UL — SIGNIFICANT CHANGE UP (ref 1.4–6.5)
NEUTROPHILS NFR BLD AUTO: 67.6 % — SIGNIFICANT CHANGE UP (ref 42.2–75.2)
NRBC BLD AUTO-RTO: 0 /100 WBCS — SIGNIFICANT CHANGE UP (ref 0–0)
PLATELET # BLD AUTO: 286 K/UL — SIGNIFICANT CHANGE UP (ref 130–400)
PMV BLD: 9.8 FL — SIGNIFICANT CHANGE UP (ref 7.4–10.4)
POTASSIUM SERPL-MCNC: 4.7 MMOL/L — SIGNIFICANT CHANGE UP (ref 3.5–5)
POTASSIUM SERPL-SCNC: 4.7 MMOL/L — SIGNIFICANT CHANGE UP (ref 3.5–5)
PROT SERPL-MCNC: 6.4 G/DL — SIGNIFICANT CHANGE UP (ref 6–8)
RBC # BLD: 4.09 M/UL — LOW (ref 4.7–6.1)
RBC # FLD: 12.1 % — SIGNIFICANT CHANGE UP (ref 11.5–14.5)
SODIUM SERPL-SCNC: 143 MMOL/L — SIGNIFICANT CHANGE UP (ref 135–146)
WBC # BLD: 7.51 K/UL — SIGNIFICANT CHANGE UP (ref 4.8–10.8)
WBC # FLD AUTO: 7.51 K/UL — SIGNIFICANT CHANGE UP (ref 4.8–10.8)

## 2025-09-09 PROCEDURE — 87040 BLOOD CULTURE FOR BACTERIA: CPT

## 2025-09-09 PROCEDURE — 80053 COMPREHEN METABOLIC PANEL: CPT

## 2025-09-09 PROCEDURE — 73590 X-RAY EXAM OF LOWER LEG: CPT | Mod: RT

## 2025-09-09 PROCEDURE — 85025 COMPLETE CBC W/AUTO DIFF WBC: CPT

## 2025-09-09 PROCEDURE — 99283 EMERGENCY DEPT VISIT LOW MDM: CPT | Mod: 25

## 2025-09-09 PROCEDURE — 73590 X-RAY EXAM OF LOWER LEG: CPT | Mod: 26,RT

## 2025-09-09 PROCEDURE — 36415 COLL VENOUS BLD VENIPUNCTURE: CPT

## 2025-09-09 RX ORDER — VANCOMYCIN HCL IN 5 % DEXTROSE 1.5G/250ML
1000 PLASTIC BAG, INJECTION (ML) INTRAVENOUS ONCE
Refills: 0 | Status: DISCONTINUED | OUTPATIENT
Start: 2025-09-09 | End: 2025-09-09

## 2025-09-09 RX ORDER — CEPHALEXIN 250 MG/1
1 CAPSULE ORAL
Qty: 28 | Refills: 0
Start: 2025-09-09 | End: 2025-09-15

## 2025-09-09 RX ORDER — SULFAMETHOXAZOLE/TRIMETHOPRIM 800-160 MG
1 TABLET ORAL
Qty: 14 | Refills: 0
Start: 2025-09-09 | End: 2025-09-15

## 2025-09-14 LAB
CULTURE RESULTS: SIGNIFICANT CHANGE UP
CULTURE RESULTS: SIGNIFICANT CHANGE UP
SPECIMEN SOURCE: SIGNIFICANT CHANGE UP
SPECIMEN SOURCE: SIGNIFICANT CHANGE UP

## 2025-09-18 DIAGNOSIS — S81.801A UNSPECIFIED OPEN WOUND, RIGHT LOWER LEG, INITIAL ENCOUNTER: ICD-10-CM

## 2025-09-21 LAB — MICROORGANISM/AGENT SPEC: NORMAL
